# Patient Record
Sex: FEMALE | Race: WHITE | Employment: OTHER | URBAN - METROPOLITAN AREA
[De-identification: names, ages, dates, MRNs, and addresses within clinical notes are randomized per-mention and may not be internally consistent; named-entity substitution may affect disease eponyms.]

---

## 2018-01-01 ENCOUNTER — APPOINTMENT (OUTPATIENT)
Dept: GENERAL RADIOLOGY | Age: 73
DRG: 189 | End: 2018-01-01
Payer: MEDICARE

## 2018-01-01 ENCOUNTER — HOSPITAL ENCOUNTER (INPATIENT)
Age: 73
LOS: 2 days | Discharge: HOME | DRG: 189 | End: 2018-12-17
Attending: EMERGENCY MEDICINE | Admitting: FAMILY MEDICINE
Payer: MEDICARE

## 2018-01-01 ENCOUNTER — TELEPHONE (OUTPATIENT)
Dept: PULMONOLOGY | Age: 73
End: 2018-01-01

## 2018-01-01 VITALS
RESPIRATION RATE: 18 BRPM | BODY MASS INDEX: 28.51 KG/M2 | HEART RATE: 77 BPM | HEIGHT: 61 IN | SYSTOLIC BLOOD PRESSURE: 163 MMHG | WEIGHT: 151 LBS | TEMPERATURE: 98.3 F | OXYGEN SATURATION: 95 % | DIASTOLIC BLOOD PRESSURE: 77 MMHG

## 2018-01-01 DIAGNOSIS — J44.1 CHRONIC OBSTRUCTIVE PULMONARY DISEASE WITH ACUTE EXACERBATION (HCC): ICD-10-CM

## 2018-01-01 DIAGNOSIS — J96.22 ACUTE ON CHRONIC RESPIRATORY FAILURE WITH HYPOXIA AND HYPERCAPNIA (HCC): Primary | ICD-10-CM

## 2018-01-01 DIAGNOSIS — J44.1 CHRONIC OBSTRUCTIVE PULMONARY DISEASE WITH ACUTE EXACERBATION (HCC): Primary | ICD-10-CM

## 2018-01-01 DIAGNOSIS — J96.21 ACUTE ON CHRONIC RESPIRATORY FAILURE WITH HYPOXIA AND HYPERCAPNIA (HCC): Primary | ICD-10-CM

## 2018-01-01 LAB
ALBUMIN SERPL-MCNC: 3.6 G/DL (ref 3.5–5.2)
ALBUMIN SERPL-MCNC: 3.6 G/DL (ref 3.5–5.2)
ALBUMIN SERPL-MCNC: 4.5 G/DL (ref 3.5–5.2)
ALP BLD-CCNC: 69 U/L (ref 35–104)
ALP BLD-CCNC: 69 U/L (ref 35–104)
ALP BLD-CCNC: 89 U/L (ref 35–104)
ALT SERPL-CCNC: 10 U/L (ref 0–32)
ALT SERPL-CCNC: 11 U/L (ref 0–32)
ALT SERPL-CCNC: 9 U/L (ref 0–32)
ANION GAP SERPL CALCULATED.3IONS-SCNC: 10 MMOL/L (ref 7–16)
ANION GAP SERPL CALCULATED.3IONS-SCNC: 11 MMOL/L (ref 7–16)
ANION GAP SERPL CALCULATED.3IONS-SCNC: 13 MMOL/L (ref 7–16)
AST SERPL-CCNC: 13 U/L (ref 0–31)
AST SERPL-CCNC: 15 U/L (ref 0–31)
AST SERPL-CCNC: 22 U/L (ref 0–31)
B.E.: 0.8 MMOL/L (ref -3–3)
BACTERIA: ABNORMAL /HPF
BASOPHILS ABSOLUTE: 0 E9/L (ref 0–0.2)
BASOPHILS ABSOLUTE: 0.04 E9/L (ref 0–0.2)
BASOPHILS RELATIVE PERCENT: 0 % (ref 0–2)
BASOPHILS RELATIVE PERCENT: 0.6 % (ref 0–2)
BILIRUB SERPL-MCNC: 0.3 MG/DL (ref 0–1.2)
BILIRUB SERPL-MCNC: 0.3 MG/DL (ref 0–1.2)
BILIRUB SERPL-MCNC: 0.4 MG/DL (ref 0–1.2)
BILIRUBIN URINE: NEGATIVE
BLOOD CULTURE, ROUTINE: NORMAL
BLOOD, URINE: ABNORMAL
BUN BLDV-MCNC: 30 MG/DL (ref 8–23)
BUN BLDV-MCNC: 34 MG/DL (ref 8–23)
BUN BLDV-MCNC: 47 MG/DL (ref 8–23)
C-REACTIVE PROTEIN: 0.5 MG/DL (ref 0–0.4)
CALCIUM SERPL-MCNC: 10.1 MG/DL (ref 8.6–10.2)
CALCIUM SERPL-MCNC: 8.9 MG/DL (ref 8.6–10.2)
CALCIUM SERPL-MCNC: 9.6 MG/DL (ref 8.6–10.2)
CHLORIDE BLD-SCNC: 102 MMOL/L (ref 98–107)
CHLORIDE BLD-SCNC: 98 MMOL/L (ref 98–107)
CHLORIDE BLD-SCNC: 99 MMOL/L (ref 98–107)
CHOLESTEROL, TOTAL: 131 MG/DL (ref 0–199)
CLARITY: CLEAR
CO2: 27 MMOL/L (ref 22–29)
CO2: 28 MMOL/L (ref 22–29)
CO2: 29 MMOL/L (ref 22–29)
COLOR: YELLOW
CREAT SERPL-MCNC: 1.6 MG/DL (ref 0.5–1)
CREAT SERPL-MCNC: 1.6 MG/DL (ref 0.5–1)
CREAT SERPL-MCNC: 1.7 MG/DL (ref 0.5–1)
CULTURE, BLOOD 2: NORMAL
DELIVERY SYSTEMS: ABNORMAL
DEVICE: ABNORMAL
EKG ATRIAL RATE: 56 BPM
EKG P AXIS: 89 DEGREES
EKG P-R INTERVAL: 180 MS
EKG Q-T INTERVAL: 442 MS
EKG QRS DURATION: 90 MS
EKG QTC CALCULATION (BAZETT): 426 MS
EKG R AXIS: 20 DEGREES
EKG T AXIS: 83 DEGREES
EKG VENTRICULAR RATE: 56 BPM
EOSINOPHILS ABSOLUTE: 0 E9/L (ref 0.05–0.5)
EOSINOPHILS ABSOLUTE: 0.17 E9/L (ref 0.05–0.5)
EOSINOPHILS RELATIVE PERCENT: 0 % (ref 0–6)
EOSINOPHILS RELATIVE PERCENT: 2.5 % (ref 0–6)
EPITHELIAL CELLS, UA: ABNORMAL /HPF
FILM ARRAY ADENOVIRUS: NORMAL
FILM ARRAY BORDETELLA PERTUSSIS: NORMAL
FILM ARRAY CHLAMYDOPHILIA PNEUMONIAE: NORMAL
FILM ARRAY CORONAVIRUS 229E: NORMAL
FILM ARRAY CORONAVIRUS HKU1: NORMAL
FILM ARRAY CORONAVIRUS NL63: NORMAL
FILM ARRAY CORONAVIRUS OC43: NORMAL
FILM ARRAY INFLUENZA A VIRUS 09H1: NORMAL
FILM ARRAY INFLUENZA A VIRUS H1: NORMAL
FILM ARRAY INFLUENZA A VIRUS H3: NORMAL
FILM ARRAY INFLUENZA A VIRUS: NORMAL
FILM ARRAY INFLUENZA B: NORMAL
FILM ARRAY METAPNEUMOVIRUS: NORMAL
FILM ARRAY MYCOPLASMA PNEUMONIAE: NORMAL
FILM ARRAY PARAINFLUENZA VIRUS 1: NORMAL
FILM ARRAY PARAINFLUENZA VIRUS 2: NORMAL
FILM ARRAY PARAINFLUENZA VIRUS 3: NORMAL
FILM ARRAY PARAINFLUENZA VIRUS 4: NORMAL
FILM ARRAY RESPIRATORY SYNCITIAL VIRUS: NORMAL
FILM ARRAY RHINOVIRUS/ENTEROVIRUS: NORMAL
FIO2 ARTERIAL: 40
GFR AFRICAN AMERICAN: 36
GFR AFRICAN AMERICAN: 38
GFR AFRICAN AMERICAN: 38
GFR NON-AFRICAN AMERICAN: 29 ML/MIN/1.73
GFR NON-AFRICAN AMERICAN: 32 ML/MIN/1.73
GFR NON-AFRICAN AMERICAN: 32 ML/MIN/1.73
GLUCOSE BLD-MCNC: 137 MG/DL (ref 74–99)
GLUCOSE BLD-MCNC: 222 MG/DL (ref 74–99)
GLUCOSE BLD-MCNC: 253 MG/DL (ref 74–99)
GLUCOSE URINE: NEGATIVE MG/DL
HBA1C MFR BLD: 5.2 % (ref 4–5.6)
HCO3 ARTERIAL: 28.7 MMOL/L (ref 22–26)
HCT VFR BLD CALC: 35.6 % (ref 34–48)
HCT VFR BLD CALC: 45.6 % (ref 34–48)
HDLC SERPL-MCNC: 33 MG/DL
HEMOGLOBIN: 11 G/DL (ref 11.5–15.5)
HEMOGLOBIN: 13.9 G/DL (ref 11.5–15.5)
IMMATURE GRANULOCYTES #: 0.08 E9/L
IMMATURE GRANULOCYTES %: 1.2 % (ref 0–5)
KETONES, URINE: ABNORMAL MG/DL
LACTIC ACID: 0.8 MMOL/L (ref 0.5–2.2)
LACTIC ACID: 3.5 MMOL/L (ref 0.5–2.2)
LDL CHOLESTEROL CALCULATED: 71 MG/DL (ref 0–99)
LEUKOCYTE ESTERASE, URINE: ABNORMAL
LYMPHOCYTES ABSOLUTE: 0.06 E9/L (ref 1.5–4)
LYMPHOCYTES ABSOLUTE: 2.07 E9/L (ref 1.5–4)
LYMPHOCYTES RELATIVE PERCENT: 0.9 % (ref 20–42)
LYMPHOCYTES RELATIVE PERCENT: 30.8 % (ref 20–42)
MAGNESIUM: 2.1 MG/DL (ref 1.6–2.6)
MCH RBC QN AUTO: 29.4 PG (ref 26–35)
MCH RBC QN AUTO: 29.6 PG (ref 26–35)
MCHC RBC AUTO-ENTMCNC: 30.5 % (ref 32–34.5)
MCHC RBC AUTO-ENTMCNC: 30.9 % (ref 32–34.5)
MCV RBC AUTO: 95.2 FL (ref 80–99.9)
MCV RBC AUTO: 97 FL (ref 80–99.9)
METAMYELOCYTES RELATIVE PERCENT: 0.9 % (ref 0–1)
METER GLUCOSE: 149 MG/DL (ref 74–99)
METER GLUCOSE: 183 MG/DL (ref 74–99)
METER GLUCOSE: 212 MG/DL (ref 74–99)
METER GLUCOSE: 227 MG/DL (ref 74–99)
METER GLUCOSE: 251 MG/DL (ref 74–99)
METER GLUCOSE: 263 MG/DL (ref 74–99)
METER GLUCOSE: 342 MG/DL (ref 74–99)
MONOCYTES ABSOLUTE: 0.06 E9/L (ref 0.1–0.95)
MONOCYTES ABSOLUTE: 0.61 E9/L (ref 0.1–0.95)
MONOCYTES RELATIVE PERCENT: 0.9 % (ref 2–12)
MONOCYTES RELATIVE PERCENT: 9.1 % (ref 2–12)
MYELOCYTE PERCENT: 0.9 % (ref 0–0)
NEUTROPHILS ABSOLUTE: 3.74 E9/L (ref 1.8–7.3)
NEUTROPHILS ABSOLUTE: 5.68 E9/L (ref 1.8–7.3)
NEUTROPHILS RELATIVE PERCENT: 55.8 % (ref 43–80)
NEUTROPHILS RELATIVE PERCENT: 96.5 % (ref 43–80)
NITRITE, URINE: NEGATIVE
O2 SATURATION: 98 % (ref 92–98.5)
OPERATOR ID: 2124
ORGANISM: ABNORMAL
OVALOCYTES: ABNORMAL
PCO2 ARTERIAL: 59.2 MMHG (ref 35–45)
PDW BLD-RTO: 15 FL (ref 11.5–15)
PDW BLD-RTO: 15.2 FL (ref 11.5–15)
PH BLOOD GAS: 7.29 (ref 7.35–7.45)
PH UA: 6 (ref 5–9)
PLATELET # BLD: 160 E9/L (ref 130–450)
PLATELET # BLD: 199 E9/L (ref 130–450)
PMV BLD AUTO: 10.4 FL (ref 7–12)
PMV BLD AUTO: 9.8 FL (ref 7–12)
PO2 ARTERIAL: 118.7 MMHG (ref 60–80)
POIKILOCYTES: ABNORMAL
POLYCHROMASIA: ABNORMAL
POTASSIUM SERPL-SCNC: 4.7 MMOL/L (ref 3.5–5)
POTASSIUM SERPL-SCNC: 4.9 MMOL/L (ref 3.5–5)
POTASSIUM SERPL-SCNC: 4.9 MMOL/L (ref 3.5–5)
PRO-BNP: 2934 PG/ML (ref 0–450)
PROTEIN UA: 100 MG/DL
RBC # BLD: 3.74 E12/L (ref 3.5–5.5)
RBC # BLD: 4.7 E12/L (ref 3.5–5.5)
RBC UA: ABNORMAL /HPF (ref 0–2)
SEDIMENTATION RATE, ERYTHROCYTE: 23 MM/HR (ref 0–20)
SODIUM BLD-SCNC: 137 MMOL/L (ref 132–146)
SODIUM BLD-SCNC: 140 MMOL/L (ref 132–146)
SODIUM BLD-SCNC: 140 MMOL/L (ref 132–146)
SOURCE, BLOOD GAS: ABNORMAL
SPECIFIC GRAVITY UA: >=1.03 (ref 1–1.03)
TOTAL PROTEIN: 6.5 G/DL (ref 6.4–8.3)
TOTAL PROTEIN: 6.9 G/DL (ref 6.4–8.3)
TOTAL PROTEIN: 8 G/DL (ref 6.4–8.3)
TRIGL SERPL-MCNC: 134 MG/DL (ref 0–149)
TROPONIN: 0.01 NG/ML (ref 0–0.03)
TSH SERPL DL<=0.05 MIU/L-ACNC: 1.04 UIU/ML (ref 0.27–4.2)
URINE CULTURE, ROUTINE: ABNORMAL
URINE CULTURE, ROUTINE: ABNORMAL
UROBILINOGEN, URINE: 0.2 E.U./DL
VLDLC SERPL CALC-MCNC: 27 MG/DL
WBC # BLD: 5.8 E9/L (ref 4.5–11.5)
WBC # BLD: 6.7 E9/L (ref 4.5–11.5)
WBC UA: >20 /HPF (ref 0–5)

## 2018-01-01 PROCEDURE — 6360000002 HC RX W HCPCS: Performed by: INTERNAL MEDICINE

## 2018-01-01 PROCEDURE — 85025 COMPLETE CBC W/AUTO DIFF WBC: CPT

## 2018-01-01 PROCEDURE — 97165 OT EVAL LOW COMPLEX 30 MIN: CPT

## 2018-01-01 PROCEDURE — 2700000000 HC OXYGEN THERAPY PER DAY

## 2018-01-01 PROCEDURE — 87633 RESP VIRUS 12-25 TARGETS: CPT

## 2018-01-01 PROCEDURE — 71045 X-RAY EXAM CHEST 1 VIEW: CPT

## 2018-01-01 PROCEDURE — 82962 GLUCOSE BLOOD TEST: CPT

## 2018-01-01 PROCEDURE — G8988 SELF CARE GOAL STATUS: HCPCS

## 2018-01-01 PROCEDURE — 99285 EMERGENCY DEPT VISIT HI MDM: CPT

## 2018-01-01 PROCEDURE — 97161 PT EVAL LOW COMPLEX 20 MIN: CPT | Performed by: PHYSICAL THERAPIST

## 2018-01-01 PROCEDURE — 80053 COMPREHEN METABOLIC PANEL: CPT

## 2018-01-01 PROCEDURE — 2580000003 HC RX 258: Performed by: FAMILY MEDICINE

## 2018-01-01 PROCEDURE — G8987 SELF CARE CURRENT STATUS: HCPCS

## 2018-01-01 PROCEDURE — 96374 THER/PROPH/DIAG INJ IV PUSH: CPT

## 2018-01-01 PROCEDURE — 36600 WITHDRAWAL OF ARTERIAL BLOOD: CPT

## 2018-01-01 PROCEDURE — 83735 ASSAY OF MAGNESIUM: CPT

## 2018-01-01 PROCEDURE — 2060000000 HC ICU INTERMEDIATE R&B

## 2018-01-01 PROCEDURE — 87486 CHLMYD PNEUM DNA AMP PROBE: CPT

## 2018-01-01 PROCEDURE — 6360000002 HC RX W HCPCS: Performed by: FAMILY MEDICINE

## 2018-01-01 PROCEDURE — 94640 AIRWAY INHALATION TREATMENT: CPT

## 2018-01-01 PROCEDURE — 6370000000 HC RX 637 (ALT 250 FOR IP): Performed by: FAMILY MEDICINE

## 2018-01-01 PROCEDURE — 84443 ASSAY THYROID STIM HORMONE: CPT

## 2018-01-01 PROCEDURE — 86140 C-REACTIVE PROTEIN: CPT

## 2018-01-01 PROCEDURE — 87186 SC STD MICRODIL/AGAR DIL: CPT

## 2018-01-01 PROCEDURE — 97535 SELF CARE MNGMENT TRAINING: CPT

## 2018-01-01 PROCEDURE — 87040 BLOOD CULTURE FOR BACTERIA: CPT

## 2018-01-01 PROCEDURE — 82803 BLOOD GASES ANY COMBINATION: CPT

## 2018-01-01 PROCEDURE — G8978 MOBILITY CURRENT STATUS: HCPCS | Performed by: PHYSICAL THERAPIST

## 2018-01-01 PROCEDURE — G8979 MOBILITY GOAL STATUS: HCPCS | Performed by: PHYSICAL THERAPIST

## 2018-01-01 PROCEDURE — 87581 M.PNEUMON DNA AMP PROBE: CPT

## 2018-01-01 PROCEDURE — 87088 URINE BACTERIA CULTURE: CPT

## 2018-01-01 PROCEDURE — 6360000002 HC RX W HCPCS: Performed by: STUDENT IN AN ORGANIZED HEALTH CARE EDUCATION/TRAINING PROGRAM

## 2018-01-01 PROCEDURE — 85651 RBC SED RATE NONAUTOMATED: CPT

## 2018-01-01 PROCEDURE — 36415 COLL VENOUS BLD VENIPUNCTURE: CPT

## 2018-01-01 PROCEDURE — 83605 ASSAY OF LACTIC ACID: CPT

## 2018-01-01 PROCEDURE — 80061 LIPID PANEL: CPT

## 2018-01-01 PROCEDURE — 94660 CPAP INITIATION&MGMT: CPT

## 2018-01-01 PROCEDURE — 83880 ASSAY OF NATRIURETIC PEPTIDE: CPT

## 2018-01-01 PROCEDURE — 84484 ASSAY OF TROPONIN QUANT: CPT

## 2018-01-01 PROCEDURE — 87798 DETECT AGENT NOS DNA AMP: CPT

## 2018-01-01 PROCEDURE — 6370000000 HC RX 637 (ALT 250 FOR IP): Performed by: STUDENT IN AN ORGANIZED HEALTH CARE EDUCATION/TRAINING PROGRAM

## 2018-01-01 PROCEDURE — 81001 URINALYSIS AUTO W/SCOPE: CPT

## 2018-01-01 PROCEDURE — 83036 HEMOGLOBIN GLYCOSYLATED A1C: CPT

## 2018-01-01 RX ORDER — FUROSEMIDE 40 MG/1
40 TABLET ORAL DAILY
Status: DISCONTINUED | OUTPATIENT
Start: 2018-01-01 | End: 2018-01-01 | Stop reason: HOSPADM

## 2018-01-01 RX ORDER — PANTOPRAZOLE SODIUM 40 MG/1
40 TABLET, DELAYED RELEASE ORAL DAILY
Status: DISCONTINUED | OUTPATIENT
Start: 2018-01-01 | End: 2018-01-01 | Stop reason: HOSPADM

## 2018-01-01 RX ORDER — OYSTER SHELL CALCIUM WITH VITAMIN D 500; 200 MG/1; [IU]/1
1 TABLET, FILM COATED ORAL 2 TIMES DAILY
Status: DISCONTINUED | OUTPATIENT
Start: 2018-01-01 | End: 2018-01-01

## 2018-01-01 RX ORDER — ATORVASTATIN CALCIUM 40 MG/1
80 TABLET, FILM COATED ORAL NIGHTLY
Status: DISCONTINUED | OUTPATIENT
Start: 2018-01-01 | End: 2018-01-01 | Stop reason: HOSPADM

## 2018-01-01 RX ORDER — PREDNISONE 20 MG/1
20 TABLET ORAL 2 TIMES DAILY
Qty: 20 TABLET | Refills: 0 | Status: SHIPPED | OUTPATIENT
Start: 2018-01-01 | End: 2018-01-01

## 2018-01-01 RX ORDER — ALBUTEROL SULFATE 90 UG/1
2 AEROSOL, METERED RESPIRATORY (INHALATION) EVERY 6 HOURS PRN
Qty: 1 INHALER | Refills: 3 | Status: SHIPPED | OUTPATIENT
Start: 2018-01-01

## 2018-01-01 RX ORDER — METHYLPREDNISOLONE SODIUM SUCCINATE 125 MG/2ML
125 INJECTION, POWDER, LYOPHILIZED, FOR SOLUTION INTRAMUSCULAR; INTRAVENOUS ONCE
Status: COMPLETED | OUTPATIENT
Start: 2018-01-01 | End: 2018-01-01

## 2018-01-01 RX ORDER — ASPIRIN 325 MG
325 TABLET ORAL DAILY
Status: DISCONTINUED | OUTPATIENT
Start: 2018-01-01 | End: 2018-01-01

## 2018-01-01 RX ORDER — PREDNISONE 20 MG/1
TABLET ORAL
Qty: 30 TABLET | Refills: 0 | Status: SHIPPED | OUTPATIENT
Start: 2018-01-01 | End: 2018-01-01

## 2018-01-01 RX ORDER — DILTIAZEM HYDROCHLORIDE 180 MG/1
180 CAPSULE, COATED, EXTENDED RELEASE ORAL DAILY
Status: DISCONTINUED | OUTPATIENT
Start: 2018-01-01 | End: 2018-01-01 | Stop reason: HOSPADM

## 2018-01-01 RX ORDER — ALBUTEROL SULFATE 2.5 MG/3ML
5 SOLUTION RESPIRATORY (INHALATION) 4 TIMES DAILY PRN
Status: DISCONTINUED | OUTPATIENT
Start: 2018-01-01 | End: 2018-01-01

## 2018-01-01 RX ORDER — INSULIN GLARGINE 100 [IU]/ML
12 INJECTION, SOLUTION SUBCUTANEOUS NIGHTLY
Status: DISCONTINUED | OUTPATIENT
Start: 2018-01-01 | End: 2018-01-01 | Stop reason: HOSPADM

## 2018-01-01 RX ORDER — DEXTROSE MONOHYDRATE 50 MG/ML
100 INJECTION, SOLUTION INTRAVENOUS PRN
Status: DISCONTINUED | OUTPATIENT
Start: 2018-01-01 | End: 2018-01-01 | Stop reason: HOSPADM

## 2018-01-01 RX ORDER — METHYLPREDNISOLONE SODIUM SUCCINATE 40 MG/ML
40 INJECTION, POWDER, LYOPHILIZED, FOR SOLUTION INTRAMUSCULAR; INTRAVENOUS EVERY 6 HOURS
Status: DISCONTINUED | OUTPATIENT
Start: 2018-01-01 | End: 2018-01-01

## 2018-01-01 RX ORDER — SODIUM CHLORIDE 0.9 % (FLUSH) 0.9 %
10 SYRINGE (ML) INJECTION PRN
Status: DISCONTINUED | OUTPATIENT
Start: 2018-01-01 | End: 2018-01-01 | Stop reason: HOSPADM

## 2018-01-01 RX ORDER — CLONAZEPAM 0.5 MG/1
0.5 TABLET ORAL 2 TIMES DAILY
Status: DISCONTINUED | OUTPATIENT
Start: 2018-01-01 | End: 2018-01-01 | Stop reason: HOSPADM

## 2018-01-01 RX ORDER — PREDNISONE 20 MG/1
20 TABLET ORAL DAILY
Qty: 10 TABLET | Refills: 0 | Status: SHIPPED | OUTPATIENT
Start: 2018-01-01 | End: 2019-01-01

## 2018-01-01 RX ORDER — IPRATROPIUM BROMIDE AND ALBUTEROL SULFATE 2.5; .5 MG/3ML; MG/3ML
3 SOLUTION RESPIRATORY (INHALATION) ONCE
Status: COMPLETED | OUTPATIENT
Start: 2018-01-01 | End: 2018-01-01

## 2018-01-01 RX ORDER — ASPIRIN 81 MG/1
81 TABLET, CHEWABLE ORAL DAILY
Status: DISCONTINUED | OUTPATIENT
Start: 2018-01-01 | End: 2018-01-01 | Stop reason: HOSPADM

## 2018-01-01 RX ORDER — NICOTINE 21 MG/24HR
1 PATCH, TRANSDERMAL 24 HOURS TRANSDERMAL DAILY
Status: DISCONTINUED | OUTPATIENT
Start: 2018-01-01 | End: 2018-01-01 | Stop reason: HOSPADM

## 2018-01-01 RX ORDER — ACETAMINOPHEN 325 MG/1
650 TABLET ORAL EVERY 4 HOURS PRN
Status: DISCONTINUED | OUTPATIENT
Start: 2018-01-01 | End: 2018-01-01 | Stop reason: HOSPADM

## 2018-01-01 RX ORDER — LEVOFLOXACIN 500 MG/1
500 TABLET, FILM COATED ORAL DAILY
Qty: 3 TABLET | Refills: 0 | Status: SHIPPED | OUTPATIENT
Start: 2018-01-01 | End: 2018-01-01

## 2018-01-01 RX ORDER — LEVOTHYROXINE SODIUM 137 UG/1
137 TABLET ORAL DAILY
Status: DISCONTINUED | OUTPATIENT
Start: 2018-01-01 | End: 2018-01-01 | Stop reason: HOSPADM

## 2018-01-01 RX ORDER — AMLODIPINE BESYLATE 5 MG/1
5 TABLET ORAL DAILY
Status: DISCONTINUED | OUTPATIENT
Start: 2018-01-01 | End: 2018-01-01 | Stop reason: HOSPADM

## 2018-01-01 RX ORDER — DEXTROSE MONOHYDRATE 25 G/50ML
12.5 INJECTION, SOLUTION INTRAVENOUS PRN
Status: DISCONTINUED | OUTPATIENT
Start: 2018-01-01 | End: 2018-01-01 | Stop reason: HOSPADM

## 2018-01-01 RX ORDER — METHYLPREDNISOLONE SODIUM SUCCINATE 40 MG/ML
40 INJECTION, POWDER, LYOPHILIZED, FOR SOLUTION INTRAMUSCULAR; INTRAVENOUS EVERY 12 HOURS
Status: DISCONTINUED | OUTPATIENT
Start: 2018-01-01 | End: 2018-01-01 | Stop reason: HOSPADM

## 2018-01-01 RX ORDER — LANOLIN ALCOHOL/MO/W.PET/CERES
3 CREAM (GRAM) TOPICAL NIGHTLY PRN
Status: DISCONTINUED | OUTPATIENT
Start: 2018-01-01 | End: 2018-01-01 | Stop reason: HOSPADM

## 2018-01-01 RX ORDER — OXYCODONE HYDROCHLORIDE 15 MG/1
15 TABLET ORAL 4 TIMES DAILY
Status: DISCONTINUED | OUTPATIENT
Start: 2018-01-01 | End: 2018-01-01

## 2018-01-01 RX ORDER — OXYCODONE HYDROCHLORIDE 15 MG/1
15 TABLET ORAL EVERY 6 HOURS PRN
Status: DISCONTINUED | OUTPATIENT
Start: 2018-01-01 | End: 2018-01-01 | Stop reason: HOSPADM

## 2018-01-01 RX ORDER — IPRATROPIUM BROMIDE AND ALBUTEROL SULFATE 2.5; .5 MG/3ML; MG/3ML
1 SOLUTION RESPIRATORY (INHALATION)
Status: DISCONTINUED | OUTPATIENT
Start: 2018-01-01 | End: 2018-01-01 | Stop reason: HOSPADM

## 2018-01-01 RX ORDER — GABAPENTIN 100 MG/1
100 CAPSULE ORAL 3 TIMES DAILY
Status: DISCONTINUED | OUTPATIENT
Start: 2018-01-01 | End: 2018-01-01 | Stop reason: HOSPADM

## 2018-01-01 RX ORDER — SODIUM CHLORIDE 0.9 % (FLUSH) 0.9 %
10 SYRINGE (ML) INJECTION EVERY 12 HOURS SCHEDULED
Status: DISCONTINUED | OUTPATIENT
Start: 2018-01-01 | End: 2018-01-01 | Stop reason: HOSPADM

## 2018-01-01 RX ORDER — NICOTINE POLACRILEX 4 MG
15 LOZENGE BUCCAL PRN
Status: DISCONTINUED | OUTPATIENT
Start: 2018-01-01 | End: 2018-01-01 | Stop reason: HOSPADM

## 2018-01-01 RX ORDER — CITALOPRAM 20 MG/1
20 TABLET ORAL NIGHTLY
Status: DISCONTINUED | OUTPATIENT
Start: 2018-01-01 | End: 2018-01-01 | Stop reason: HOSPADM

## 2018-01-01 RX ORDER — ALBUTEROL SULFATE 2.5 MG/3ML
5 SOLUTION RESPIRATORY (INHALATION) 4 TIMES DAILY
Status: DISCONTINUED | OUTPATIENT
Start: 2018-01-01 | End: 2018-01-01

## 2018-01-01 RX ORDER — CLOPIDOGREL BISULFATE 75 MG/1
75 TABLET ORAL DAILY
Status: DISCONTINUED | OUTPATIENT
Start: 2018-01-01 | End: 2018-01-01 | Stop reason: HOSPADM

## 2018-01-01 RX ADMIN — IPRATROPIUM BROMIDE AND ALBUTEROL SULFATE 1 AMPULE: .5; 3 SOLUTION RESPIRATORY (INHALATION) at 05:25

## 2018-01-01 RX ADMIN — Medication 10 ML: at 08:30

## 2018-01-01 RX ADMIN — ASPIRIN 81 MG 81 MG: 81 TABLET ORAL at 08:59

## 2018-01-01 RX ADMIN — MELATONIN TAB 3 MG 3 MG: 3 TAB at 00:24

## 2018-01-01 RX ADMIN — Medication 10 ML: at 09:15

## 2018-01-01 RX ADMIN — GABAPENTIN 100 MG: 100 CAPSULE ORAL at 08:27

## 2018-01-01 RX ADMIN — IPRATROPIUM BROMIDE AND ALBUTEROL SULFATE 3 AMPULE: .5; 3 SOLUTION RESPIRATORY (INHALATION) at 19:20

## 2018-01-01 RX ADMIN — ALBUTEROL SULFATE 5 MG: 2.5 SOLUTION RESPIRATORY (INHALATION) at 22:31

## 2018-01-01 RX ADMIN — CLOPIDOGREL BISULFATE 75 MG: 75 TABLET ORAL at 08:57

## 2018-01-01 RX ADMIN — IPRATROPIUM BROMIDE AND ALBUTEROL SULFATE 1 AMPULE: .5; 3 SOLUTION RESPIRATORY (INHALATION) at 09:15

## 2018-01-01 RX ADMIN — METHYLPREDNISOLONE SODIUM SUCCINATE 40 MG: 40 INJECTION, POWDER, FOR SOLUTION INTRAMUSCULAR; INTRAVENOUS at 12:22

## 2018-01-01 RX ADMIN — SODIUM CHLORIDE 1 G: 9 INJECTION INTRAMUSCULAR; INTRAVENOUS; SUBCUTANEOUS at 13:25

## 2018-01-01 RX ADMIN — GABAPENTIN 100 MG: 100 CAPSULE ORAL at 21:40

## 2018-01-01 RX ADMIN — INSULIN LISPRO 6 UNITS: 100 INJECTION, SOLUTION INTRAVENOUS; SUBCUTANEOUS at 12:21

## 2018-01-01 RX ADMIN — GABAPENTIN 100 MG: 100 CAPSULE ORAL at 23:28

## 2018-01-01 RX ADMIN — ROFLUMILAST 500 MCG: 500 TABLET ORAL at 08:27

## 2018-01-01 RX ADMIN — OXYCODONE HYDROCHLORIDE 15 MG: 15 TABLET ORAL at 13:38

## 2018-01-01 RX ADMIN — METOPROLOL TARTRATE 25 MG: 25 TABLET ORAL at 08:27

## 2018-01-01 RX ADMIN — IPRATROPIUM BROMIDE AND ALBUTEROL SULFATE 1 AMPULE: .5; 3 SOLUTION RESPIRATORY (INHALATION) at 17:19

## 2018-01-01 RX ADMIN — GABAPENTIN 100 MG: 100 CAPSULE ORAL at 08:59

## 2018-01-01 RX ADMIN — LEVOTHYROXINE SODIUM 137 MCG: 137 TABLET ORAL at 06:02

## 2018-01-01 RX ADMIN — ASPIRIN 81 MG 81 MG: 81 TABLET ORAL at 08:27

## 2018-01-01 RX ADMIN — ACETAMINOPHEN 650 MG: 325 TABLET, FILM COATED ORAL at 14:28

## 2018-01-01 RX ADMIN — INSULIN LISPRO 4 UNITS: 100 INJECTION, SOLUTION INTRAVENOUS; SUBCUTANEOUS at 08:59

## 2018-01-01 RX ADMIN — METOPROLOL TARTRATE 25 MG: 25 TABLET ORAL at 08:57

## 2018-01-01 RX ADMIN — METHYLPREDNISOLONE SODIUM SUCCINATE 40 MG: 40 INJECTION, POWDER, FOR SOLUTION INTRAMUSCULAR; INTRAVENOUS at 11:46

## 2018-01-01 RX ADMIN — CITALOPRAM HYDROBROMIDE 20 MG: 20 TABLET ORAL at 21:40

## 2018-01-01 RX ADMIN — ATORVASTATIN CALCIUM 80 MG: 40 TABLET, FILM COATED ORAL at 21:40

## 2018-01-01 RX ADMIN — Medication 10 ML: at 23:28

## 2018-01-01 RX ADMIN — GABAPENTIN 100 MG: 100 CAPSULE ORAL at 13:24

## 2018-01-01 RX ADMIN — OXYCODONE HYDROCHLORIDE 15 MG: 15 TABLET ORAL at 23:25

## 2018-01-01 RX ADMIN — IPRATROPIUM BROMIDE AND ALBUTEROL SULFATE 1 AMPULE: .5; 3 SOLUTION RESPIRATORY (INHALATION) at 10:02

## 2018-01-01 RX ADMIN — CLONAZEPAM 0.5 MG: 0.5 TABLET ORAL at 08:27

## 2018-01-01 RX ADMIN — ATORVASTATIN CALCIUM 80 MG: 40 TABLET, FILM COATED ORAL at 23:28

## 2018-01-01 RX ADMIN — CLONAZEPAM 0.5 MG: 0.5 TABLET ORAL at 08:57

## 2018-01-01 RX ADMIN — INSULIN LISPRO 2 UNITS: 100 INJECTION, SOLUTION INTRAVENOUS; SUBCUTANEOUS at 11:46

## 2018-01-01 RX ADMIN — METHYLPREDNISOLONE SODIUM SUCCINATE 40 MG: 40 INJECTION, POWDER, FOR SOLUTION INTRAMUSCULAR; INTRAVENOUS at 06:03

## 2018-01-01 RX ADMIN — CLOPIDOGREL BISULFATE 75 MG: 75 TABLET ORAL at 08:27

## 2018-01-01 RX ADMIN — INSULIN LISPRO 8 UNITS: 100 INJECTION, SOLUTION INTRAVENOUS; SUBCUTANEOUS at 16:58

## 2018-01-01 RX ADMIN — ACETAMINOPHEN 650 MG: 325 TABLET, FILM COATED ORAL at 02:30

## 2018-01-01 RX ADMIN — INSULIN GLARGINE 12 UNITS: 100 INJECTION, SOLUTION SUBCUTANEOUS at 21:45

## 2018-01-01 RX ADMIN — FUROSEMIDE 40 MG: 40 TABLET ORAL at 08:28

## 2018-01-01 RX ADMIN — LEVOTHYROXINE SODIUM 137 MCG: 137 TABLET ORAL at 06:13

## 2018-01-01 RX ADMIN — ROFLUMILAST 500 MCG: 500 TABLET ORAL at 08:59

## 2018-01-01 RX ADMIN — PANTOPRAZOLE SODIUM 40 MG: 40 TABLET, DELAYED RELEASE ORAL at 08:57

## 2018-01-01 RX ADMIN — METHYLPREDNISOLONE SODIUM SUCCINATE 125 MG: 125 INJECTION, POWDER, FOR SOLUTION INTRAMUSCULAR; INTRAVENOUS at 19:38

## 2018-01-01 RX ADMIN — PANTOPRAZOLE SODIUM 40 MG: 40 TABLET, DELAYED RELEASE ORAL at 08:28

## 2018-01-01 RX ADMIN — INSULIN LISPRO 4 UNITS: 100 INJECTION, SOLUTION INTRAVENOUS; SUBCUTANEOUS at 08:26

## 2018-01-01 RX ADMIN — DILTIAZEM HYDROCHLORIDE 180 MG: 180 CAPSULE, COATED, EXTENDED RELEASE ORAL at 08:58

## 2018-01-01 RX ADMIN — IPRATROPIUM BROMIDE AND ALBUTEROL SULFATE 1 AMPULE: .5; 3 SOLUTION RESPIRATORY (INHALATION) at 13:09

## 2018-01-01 RX ADMIN — DILTIAZEM HYDROCHLORIDE 180 MG: 180 CAPSULE, COATED, EXTENDED RELEASE ORAL at 08:27

## 2018-01-01 RX ADMIN — FUROSEMIDE 40 MG: 40 TABLET ORAL at 08:59

## 2018-01-01 RX ADMIN — IPRATROPIUM BROMIDE AND ALBUTEROL SULFATE 1 AMPULE: .5; 3 SOLUTION RESPIRATORY (INHALATION) at 05:37

## 2018-01-01 RX ADMIN — GABAPENTIN 100 MG: 100 CAPSULE ORAL at 14:22

## 2018-01-01 RX ADMIN — Medication 10 ML: at 21:40

## 2018-01-01 RX ADMIN — CLONAZEPAM 0.5 MG: 0.5 TABLET ORAL at 23:28

## 2018-01-01 RX ADMIN — METHYLPREDNISOLONE SODIUM SUCCINATE 40 MG: 40 INJECTION, POWDER, FOR SOLUTION INTRAMUSCULAR; INTRAVENOUS at 00:24

## 2018-01-01 RX ADMIN — AMLODIPINE BESYLATE 5 MG: 5 TABLET ORAL at 08:58

## 2018-01-01 RX ADMIN — CITALOPRAM HYDROBROMIDE 20 MG: 20 TABLET ORAL at 23:28

## 2018-01-01 RX ADMIN — OXYCODONE HYDROCHLORIDE 15 MG: 15 TABLET ORAL at 06:12

## 2018-01-01 RX ADMIN — CLONAZEPAM 0.5 MG: 0.5 TABLET ORAL at 21:43

## 2018-01-01 RX ADMIN — AMLODIPINE BESYLATE 5 MG: 5 TABLET ORAL at 08:27

## 2018-01-01 ASSESSMENT — PAIN DESCRIPTION - PAIN TYPE
TYPE: ACUTE PAIN
TYPE: CHRONIC PAIN

## 2018-01-01 ASSESSMENT — PAIN SCALES - GENERAL
PAINLEVEL_OUTOF10: 6
PAINLEVEL_OUTOF10: 7
PAINLEVEL_OUTOF10: 0
PAINLEVEL_OUTOF10: 8
PAINLEVEL_OUTOF10: 0
PAINLEVEL_OUTOF10: 3
PAINLEVEL_OUTOF10: 1
PAINLEVEL_OUTOF10: 7

## 2018-01-01 ASSESSMENT — PAIN DESCRIPTION - FREQUENCY
FREQUENCY: CONTINUOUS

## 2018-01-01 ASSESSMENT — PAIN DESCRIPTION - ORIENTATION
ORIENTATION: LOWER
ORIENTATION: LOWER

## 2018-01-01 ASSESSMENT — PAIN DESCRIPTION - DESCRIPTORS
DESCRIPTORS: ACHING
DESCRIPTORS: ACHING;CONSTANT;DISCOMFORT
DESCRIPTORS: ACHING;SHARP
DESCRIPTORS: ACHING
DESCRIPTORS: ACHING

## 2018-01-01 ASSESSMENT — PAIN DESCRIPTION - LOCATION
LOCATION: BACK;GENERALIZED
LOCATION: HEAD
LOCATION: BACK
LOCATION: BACK;GENERALIZED

## 2018-07-14 ENCOUNTER — APPOINTMENT (OUTPATIENT)
Dept: GENERAL RADIOLOGY | Age: 73
DRG: 064 | End: 2018-07-14
Payer: MEDICARE

## 2018-07-14 ENCOUNTER — HOSPITAL ENCOUNTER (INPATIENT)
Age: 73
LOS: 5 days | Discharge: HOME HEALTH | DRG: 064 | End: 2018-07-20
Attending: EMERGENCY MEDICINE | Admitting: FAMILY MEDICINE
Payer: MEDICARE

## 2018-07-14 ENCOUNTER — APPOINTMENT (OUTPATIENT)
Dept: CT IMAGING | Age: 73
DRG: 064 | End: 2018-07-14
Payer: MEDICARE

## 2018-07-14 DIAGNOSIS — R29.90 STROKE-LIKE SYMPTOMS: Primary | ICD-10-CM

## 2018-07-14 PROBLEM — F17.200 TOBACCO DEPENDENCE: Status: ACTIVE | Noted: 2018-07-14

## 2018-07-14 PROBLEM — E11.29 TYPE 2 DIABETES MELLITUS WITH RENAL COMPLICATION (HCC): Status: ACTIVE | Noted: 2018-07-14

## 2018-07-14 PROBLEM — E03.9 HYPOTHYROIDISM: Status: ACTIVE | Noted: 2018-07-14

## 2018-07-14 PROBLEM — K43.9 HERNIA OF ANTERIOR ABDOMINAL WALL: Status: ACTIVE | Noted: 2018-07-14

## 2018-07-14 PROBLEM — R29.898 LEFT ARM WEAKNESS: Status: ACTIVE | Noted: 2018-07-14

## 2018-07-14 PROBLEM — N18.9 ACUTE KIDNEY INJURY SUPERIMPOSED ON CHRONIC KIDNEY DISEASE (HCC): Status: ACTIVE | Noted: 2018-07-14

## 2018-07-14 PROBLEM — T14.90XA SOFT TISSUE INJURY: Status: ACTIVE | Noted: 2018-07-14

## 2018-07-14 PROBLEM — I25.10 CAD (CORONARY ARTERY DISEASE): Status: ACTIVE | Noted: 2018-07-14

## 2018-07-14 PROBLEM — N17.9 ACUTE KIDNEY INJURY SUPERIMPOSED ON CHRONIC KIDNEY DISEASE (HCC): Status: ACTIVE | Noted: 2018-07-14

## 2018-07-14 PROBLEM — E78.5 HLD (HYPERLIPIDEMIA): Status: ACTIVE | Noted: 2018-07-14

## 2018-07-14 PROBLEM — I10 HTN (HYPERTENSION): Status: ACTIVE | Noted: 2018-07-14

## 2018-07-14 PROBLEM — N39.0 UTI (URINARY TRACT INFECTION): Status: ACTIVE | Noted: 2018-07-14

## 2018-07-14 PROBLEM — Z91.81 HISTORY OF RECENT FALL: Status: ACTIVE | Noted: 2018-07-14

## 2018-07-14 PROBLEM — J44.9 COPD (CHRONIC OBSTRUCTIVE PULMONARY DISEASE) (HCC): Status: ACTIVE | Noted: 2018-07-14

## 2018-07-14 LAB
ALBUMIN SERPL-MCNC: 3.5 G/DL (ref 3.5–5.2)
ALP BLD-CCNC: 74 U/L (ref 35–104)
ALT SERPL-CCNC: 14 U/L (ref 0–32)
ANION GAP SERPL CALCULATED.3IONS-SCNC: 10 MMOL/L (ref 7–16)
APTT: 29.7 SEC (ref 24.5–35.1)
AST SERPL-CCNC: 17 U/L (ref 0–31)
BACTERIA: ABNORMAL /HPF
BASOPHILS ABSOLUTE: 0.03 E9/L (ref 0–0.2)
BASOPHILS RELATIVE PERCENT: 0.6 % (ref 0–2)
BILIRUB SERPL-MCNC: 0.4 MG/DL (ref 0–1.2)
BILIRUBIN URINE: ABNORMAL
BLOOD, URINE: ABNORMAL
BUN BLDV-MCNC: 36 MG/DL (ref 8–23)
CALCIUM SERPL-MCNC: 9.4 MG/DL (ref 8.6–10.2)
CHLORIDE BLD-SCNC: 104 MMOL/L (ref 98–107)
CLARITY: ABNORMAL
CO2: 30 MMOL/L (ref 22–29)
COLOR: YELLOW
CREAT SERPL-MCNC: 1.8 MG/DL (ref 0.5–1)
EOSINOPHILS ABSOLUTE: 0.2 E9/L (ref 0.05–0.5)
EOSINOPHILS RELATIVE PERCENT: 3.9 % (ref 0–6)
EPITHELIAL CELLS, UA: ABNORMAL /HPF
GFR AFRICAN AMERICAN: 33
GFR NON-AFRICAN AMERICAN: 28 ML/MIN/1.73
GLUCOSE BLD-MCNC: 107 MG/DL (ref 74–109)
GLUCOSE URINE: NEGATIVE MG/DL
HCT VFR BLD CALC: 43 % (ref 34–48)
HEMOGLOBIN: 13.6 G/DL (ref 11.5–15.5)
IMMATURE GRANULOCYTES #: 0.03 E9/L
IMMATURE GRANULOCYTES %: 0.6 % (ref 0–5)
INR BLD: 1
KETONES, URINE: ABNORMAL MG/DL
LEUKOCYTE ESTERASE, URINE: ABNORMAL
LYMPHOCYTES ABSOLUTE: 0.78 E9/L (ref 1.5–4)
LYMPHOCYTES RELATIVE PERCENT: 15.4 % (ref 20–42)
MAGNESIUM: 2.1 MG/DL (ref 1.6–2.6)
MCH RBC QN AUTO: 29.9 PG (ref 26–35)
MCHC RBC AUTO-ENTMCNC: 31.6 % (ref 32–34.5)
MCV RBC AUTO: 94.5 FL (ref 80–99.9)
MONOCYTES ABSOLUTE: 0.38 E9/L (ref 0.1–0.95)
MONOCYTES RELATIVE PERCENT: 7.5 % (ref 2–12)
NEUTROPHILS ABSOLUTE: 3.65 E9/L (ref 1.8–7.3)
NEUTROPHILS RELATIVE PERCENT: 72 % (ref 43–80)
NITRITE, URINE: NEGATIVE
PDW BLD-RTO: 14.6 FL (ref 11.5–15)
PH UA: 5.5 (ref 5–9)
PLATELET # BLD: 172 E9/L (ref 130–450)
PMV BLD AUTO: 9.8 FL (ref 7–12)
POTASSIUM SERPL-SCNC: 4.4 MMOL/L (ref 3.5–5)
PRO-BNP: 1271 PG/ML (ref 0–125)
PROTEIN UA: 100 MG/DL
PROTHROMBIN TIME: 11.6 SEC (ref 9.3–12.4)
RBC # BLD: 4.55 E12/L (ref 3.5–5.5)
RBC UA: ABNORMAL /HPF (ref 0–2)
SODIUM BLD-SCNC: 144 MMOL/L (ref 132–146)
SPECIFIC GRAVITY UA: >=1.03 (ref 1–1.03)
TOTAL PROTEIN: 6.6 G/DL (ref 6.4–8.3)
TROPONIN: <0.01 NG/ML (ref 0–0.03)
UROBILINOGEN, URINE: 0.2 E.U./DL
WBC # BLD: 5.1 E9/L (ref 4.5–11.5)
WBC UA: >20 /HPF (ref 0–5)

## 2018-07-14 PROCEDURE — 6370000000 HC RX 637 (ALT 250 FOR IP): Performed by: EMERGENCY MEDICINE

## 2018-07-14 PROCEDURE — 85610 PROTHROMBIN TIME: CPT

## 2018-07-14 PROCEDURE — 83735 ASSAY OF MAGNESIUM: CPT

## 2018-07-14 PROCEDURE — 84484 ASSAY OF TROPONIN QUANT: CPT

## 2018-07-14 PROCEDURE — 81001 URINALYSIS AUTO W/SCOPE: CPT

## 2018-07-14 PROCEDURE — 36415 COLL VENOUS BLD VENIPUNCTURE: CPT

## 2018-07-14 PROCEDURE — 99285 EMERGENCY DEPT VISIT HI MDM: CPT

## 2018-07-14 PROCEDURE — 83880 ASSAY OF NATRIURETIC PEPTIDE: CPT

## 2018-07-14 PROCEDURE — 80053 COMPREHEN METABOLIC PANEL: CPT

## 2018-07-14 PROCEDURE — 85730 THROMBOPLASTIN TIME PARTIAL: CPT

## 2018-07-14 PROCEDURE — 71046 X-RAY EXAM CHEST 2 VIEWS: CPT

## 2018-07-14 PROCEDURE — 70450 CT HEAD/BRAIN W/O DYE: CPT

## 2018-07-14 PROCEDURE — 85025 COMPLETE CBC W/AUTO DIFF WBC: CPT

## 2018-07-14 RX ORDER — SODIUM CHLORIDE 0.9 % (FLUSH) 0.9 %
10 SYRINGE (ML) INJECTION EVERY 12 HOURS SCHEDULED
Status: DISCONTINUED | OUTPATIENT
Start: 2018-07-15 | End: 2018-07-20 | Stop reason: HOSPADM

## 2018-07-14 RX ORDER — CETIRIZINE HYDROCHLORIDE 10 MG/1
5 TABLET ORAL DAILY
Status: DISCONTINUED | OUTPATIENT
Start: 2018-07-15 | End: 2018-07-20 | Stop reason: HOSPADM

## 2018-07-14 RX ORDER — CLOPIDOGREL BISULFATE 75 MG/1
75 TABLET ORAL DAILY
Status: DISCONTINUED | OUTPATIENT
Start: 2018-07-15 | End: 2018-07-20 | Stop reason: HOSPADM

## 2018-07-14 RX ORDER — FENOFIBRATE 160 MG/1
160 TABLET ORAL DAILY
Status: DISCONTINUED | OUTPATIENT
Start: 2018-07-15 | End: 2018-07-20 | Stop reason: HOSPADM

## 2018-07-14 RX ORDER — OXYCODONE HYDROCHLORIDE AND ACETAMINOPHEN 5; 325 MG/1; MG/1
1 TABLET ORAL ONCE
Status: COMPLETED | OUTPATIENT
Start: 2018-07-14 | End: 2018-07-14

## 2018-07-14 RX ORDER — AMLODIPINE BESYLATE 5 MG/1
5 TABLET ORAL DAILY
Status: DISCONTINUED | OUTPATIENT
Start: 2018-07-15 | End: 2018-07-14

## 2018-07-14 RX ORDER — ASPIRIN 325 MG
325 TABLET ORAL DAILY
Status: DISCONTINUED | OUTPATIENT
Start: 2018-07-15 | End: 2018-07-20 | Stop reason: HOSPADM

## 2018-07-14 RX ORDER — PANTOPRAZOLE SODIUM 40 MG/1
40 TABLET, DELAYED RELEASE ORAL DAILY
Status: DISCONTINUED | OUTPATIENT
Start: 2018-07-15 | End: 2018-07-20 | Stop reason: HOSPADM

## 2018-07-14 RX ORDER — ATORVASTATIN CALCIUM 40 MG/1
40 TABLET, FILM COATED ORAL NIGHTLY
Status: DISCONTINUED | OUTPATIENT
Start: 2018-07-15 | End: 2018-07-17

## 2018-07-14 RX ORDER — CITALOPRAM 20 MG/1
20 TABLET ORAL NIGHTLY
Status: DISCONTINUED | OUTPATIENT
Start: 2018-07-15 | End: 2018-07-20 | Stop reason: HOSPADM

## 2018-07-14 RX ORDER — GABAPENTIN 100 MG/1
100 CAPSULE ORAL 3 TIMES DAILY
Status: DISCONTINUED | OUTPATIENT
Start: 2018-07-15 | End: 2018-07-20 | Stop reason: HOSPADM

## 2018-07-14 RX ORDER — IPRATROPIUM BROMIDE AND ALBUTEROL SULFATE 2.5; .5 MG/3ML; MG/3ML
1 SOLUTION RESPIRATORY (INHALATION) EVERY 4 HOURS PRN
Status: DISCONTINUED | OUTPATIENT
Start: 2018-07-14 | End: 2018-07-20 | Stop reason: HOSPADM

## 2018-07-14 RX ORDER — OXYCODONE HYDROCHLORIDE 5 MG/1
15 TABLET ORAL EVERY 6 HOURS PRN
Status: DISCONTINUED | OUTPATIENT
Start: 2018-07-14 | End: 2018-07-20 | Stop reason: HOSPADM

## 2018-07-14 RX ORDER — SODIUM CHLORIDE 0.9 % (FLUSH) 0.9 %
10 SYRINGE (ML) INJECTION PRN
Status: DISCONTINUED | OUTPATIENT
Start: 2018-07-14 | End: 2018-07-20 | Stop reason: HOSPADM

## 2018-07-14 RX ORDER — ZOLPIDEM TARTRATE 5 MG/1
5 TABLET ORAL NIGHTLY PRN
Status: DISCONTINUED | OUTPATIENT
Start: 2018-07-15 | End: 2018-07-20 | Stop reason: HOSPADM

## 2018-07-14 RX ORDER — CLONAZEPAM 0.5 MG/1
0.5 TABLET ORAL 2 TIMES DAILY
Status: DISCONTINUED | OUTPATIENT
Start: 2018-07-15 | End: 2018-07-20 | Stop reason: HOSPADM

## 2018-07-14 RX ORDER — LISINOPRIL 10 MG/1
10 TABLET ORAL DAILY
Status: DISCONTINUED | OUTPATIENT
Start: 2018-07-15 | End: 2018-07-18

## 2018-07-14 RX ORDER — DILTIAZEM HYDROCHLORIDE 120 MG/1
120 CAPSULE, COATED, EXTENDED RELEASE ORAL DAILY
Status: DISCONTINUED | OUTPATIENT
Start: 2018-07-15 | End: 2018-07-19

## 2018-07-14 RX ORDER — ONDANSETRON 2 MG/ML
4 INJECTION INTRAMUSCULAR; INTRAVENOUS EVERY 6 HOURS PRN
Status: DISCONTINUED | OUTPATIENT
Start: 2018-07-14 | End: 2018-07-20 | Stop reason: HOSPADM

## 2018-07-14 RX ORDER — OYSTER SHELL CALCIUM WITH VITAMIN D 500; 200 MG/1; [IU]/1
1 TABLET, FILM COATED ORAL 2 TIMES DAILY
Status: DISCONTINUED | OUTPATIENT
Start: 2018-07-15 | End: 2018-07-20 | Stop reason: HOSPADM

## 2018-07-14 RX ADMIN — OXYCODONE HYDROCHLORIDE AND ACETAMINOPHEN 1 TABLET: 5; 325 TABLET ORAL at 22:22

## 2018-07-14 ASSESSMENT — ENCOUNTER SYMPTOMS
ABDOMINAL PAIN: 0
VOMITING: 0
NAUSEA: 0
BLOOD IN STOOL: 0
SHORTNESS OF BREATH: 0
COUGH: 0
BACK PAIN: 0

## 2018-07-14 ASSESSMENT — PAIN SCALES - GENERAL: PAINLEVEL_OUTOF10: 6

## 2018-07-15 ENCOUNTER — APPOINTMENT (OUTPATIENT)
Dept: ULTRASOUND IMAGING | Age: 73
DRG: 064 | End: 2018-07-15
Payer: MEDICARE

## 2018-07-15 ENCOUNTER — APPOINTMENT (OUTPATIENT)
Dept: NUCLEAR MEDICINE | Age: 73
DRG: 064 | End: 2018-07-15
Payer: MEDICARE

## 2018-07-15 ENCOUNTER — APPOINTMENT (OUTPATIENT)
Dept: CT IMAGING | Age: 73
DRG: 064 | End: 2018-07-15
Payer: MEDICARE

## 2018-07-15 PROBLEM — J96.02 ACUTE RESPIRATORY FAILURE WITH HYPERCAPNIA (HCC): Status: ACTIVE | Noted: 2018-07-15

## 2018-07-15 PROBLEM — I63.9 ACUTE CVA (CEREBROVASCULAR ACCIDENT) (HCC): Status: ACTIVE | Noted: 2018-07-15

## 2018-07-15 LAB
AADO2: 146.8 MMHG
ANION GAP SERPL CALCULATED.3IONS-SCNC: 12 MMOL/L (ref 7–16)
B.E.: 0.7 MMOL/L (ref -3–3)
B.E.: 0.7 MMOL/L (ref -3–3)
BUN BLDV-MCNC: 34 MG/DL (ref 8–23)
CALCIUM SERPL-MCNC: 8.8 MG/DL (ref 8.6–10.2)
CHLORIDE BLD-SCNC: 103 MMOL/L (ref 98–107)
CHOLESTEROL, TOTAL: 125 MG/DL (ref 0–199)
CO2: 26 MMOL/L (ref 22–29)
COHB: 2.1 % (ref 0–1.5)
COHB: 3.6 % (ref 0–1.5)
CREAT SERPL-MCNC: 1.6 MG/DL (ref 0.5–1)
CRITICAL: ABNORMAL
CRITICAL: ABNORMAL
D DIMER: 2853 NG/ML DDU
DATE ANALYZED: ABNORMAL
DATE ANALYZED: ABNORMAL
DATE OF COLLECTION: ABNORMAL
DATE OF COLLECTION: ABNORMAL
FIO2: 40 %
GFR AFRICAN AMERICAN: 38
GFR NON-AFRICAN AMERICAN: 32 ML/MIN/1.73
GLUCOSE BLD-MCNC: 89 MG/DL (ref 74–109)
HBA1C MFR BLD: 5.9 % (ref 4–5.6)
HCO3: 26.8 MMOL/L (ref 22–26)
HCO3: 29.3 MMOL/L (ref 22–26)
HDLC SERPL-MCNC: 27 MG/DL
HHB: 11 % (ref 0–5)
HHB: 6 % (ref 0–5)
LAB: 9558
LAB: ABNORMAL
LDL CHOLESTEROL CALCULATED: 65 MG/DL (ref 0–99)
LV EF: 65 %
LVEF MODALITY: NORMAL
Lab: 1608
Lab: 941
MAGNESIUM: 2.1 MG/DL (ref 1.6–2.6)
METER GLUCOSE: 129 MG/DL (ref 70–110)
METER GLUCOSE: 81 MG/DL (ref 70–110)
METER GLUCOSE: 92 MG/DL (ref 70–110)
METHB: 0.1 % (ref 0–1.5)
METHB: 0.1 % (ref 0–1.5)
MODE: ABNORMAL
MODE: ABNORMAL
O2 CONTENT: 15.7 ML/DL
O2 CONTENT: 16 ML/DL
O2 SATURATION: 88.6 % (ref 92–98.5)
O2 SATURATION: 93.9 % (ref 92–98.5)
O2HB: 85.3 % (ref 94–97)
O2HB: 91.8 % (ref 94–97)
OPERATOR ID: 2860
OPERATOR ID: ABNORMAL
PATIENT TEMP: 37 C
PATIENT TEMP: 37 C
PCO2: 49.5 MMHG (ref 35–45)
PCO2: 66.4 MMHG (ref 35–45)
PEEP/CPAP: 6 CMH?O
PFO2: 1.79 MMHG/%
PH BLOOD GAS: 7.26 (ref 7.35–7.45)
PH BLOOD GAS: 7.35 (ref 7.35–7.45)
PO2: 62.4 MMHG (ref 60–100)
PO2: 71.5 MMHG (ref 60–100)
POTASSIUM SERPL-SCNC: 4.6 MMOL/L (ref 3.5–5)
POTASSIUM SERPL-SCNC: 5.3 MMOL/L (ref 3.5–5)
PS: 14 CMH20
RI(T): 2.05
SODIUM BLD-SCNC: 141 MMOL/L (ref 132–146)
SOURCE, BLOOD GAS: ABNORMAL
SOURCE, BLOOD GAS: ABNORMAL
THB: 12.4 G/DL (ref 11.5–16.5)
THB: 13.1 G/DL (ref 11.5–16.5)
TIME ANALYZED: 1609
TIME ANALYZED: 944
TOTAL CK: 92 U/L (ref 20–180)
TRIGL SERPL-MCNC: 165 MG/DL (ref 0–149)
TROPONIN: <0.01 NG/ML (ref 0–0.03)
TSH SERPL DL<=0.05 MIU/L-ACNC: 2.31 UIU/ML (ref 0.27–4.2)
VLDLC SERPL CALC-MCNC: 33 MG/DL

## 2018-07-15 PROCEDURE — 72125 CT NECK SPINE W/O DYE: CPT

## 2018-07-15 PROCEDURE — 2580000003 HC RX 258: Performed by: FAMILY MEDICINE

## 2018-07-15 PROCEDURE — 94664 DEMO&/EVAL PT USE INHALER: CPT

## 2018-07-15 PROCEDURE — 82805 BLOOD GASES W/O2 SATURATION: CPT

## 2018-07-15 PROCEDURE — A9540 TC99M MAA: HCPCS | Performed by: RADIOLOGY

## 2018-07-15 PROCEDURE — 99223 1ST HOSP IP/OBS HIGH 75: CPT | Performed by: INTERNAL MEDICINE

## 2018-07-15 PROCEDURE — 94660 CPAP INITIATION&MGMT: CPT

## 2018-07-15 PROCEDURE — 84443 ASSAY THYROID STIM HORMONE: CPT

## 2018-07-15 PROCEDURE — 6370000000 HC RX 637 (ALT 250 FOR IP): Performed by: FAMILY MEDICINE

## 2018-07-15 PROCEDURE — 83036 HEMOGLOBIN GLYCOSYLATED A1C: CPT

## 2018-07-15 PROCEDURE — 2060000000 HC ICU INTERMEDIATE R&B

## 2018-07-15 PROCEDURE — 6360000002 HC RX W HCPCS: Performed by: FAMILY MEDICINE

## 2018-07-15 PROCEDURE — 93306 TTE W/DOPPLER COMPLETE: CPT

## 2018-07-15 PROCEDURE — 80048 BASIC METABOLIC PNL TOTAL CA: CPT

## 2018-07-15 PROCEDURE — 2700000000 HC OXYGEN THERAPY PER DAY

## 2018-07-15 PROCEDURE — 85378 FIBRIN DEGRADE SEMIQUANT: CPT

## 2018-07-15 PROCEDURE — 94640 AIRWAY INHALATION TREATMENT: CPT

## 2018-07-15 PROCEDURE — 93970 EXTREMITY STUDY: CPT

## 2018-07-15 PROCEDURE — 84132 ASSAY OF SERUM POTASSIUM: CPT

## 2018-07-15 PROCEDURE — 80061 LIPID PANEL: CPT

## 2018-07-15 PROCEDURE — 76770 US EXAM ABDO BACK WALL COMP: CPT

## 2018-07-15 PROCEDURE — 78582 LUNG VENTILAT&PERFUS IMAGING: CPT

## 2018-07-15 PROCEDURE — 82550 ASSAY OF CK (CPK): CPT

## 2018-07-15 PROCEDURE — 82962 GLUCOSE BLOOD TEST: CPT

## 2018-07-15 PROCEDURE — A9558 XE133 XENON 10MCI: HCPCS | Performed by: RADIOLOGY

## 2018-07-15 PROCEDURE — 6360000002 HC RX W HCPCS: Performed by: INTERNAL MEDICINE

## 2018-07-15 PROCEDURE — 99223 1ST HOSP IP/OBS HIGH 75: CPT | Performed by: PSYCHIATRY & NEUROLOGY

## 2018-07-15 PROCEDURE — 84484 ASSAY OF TROPONIN QUANT: CPT

## 2018-07-15 PROCEDURE — 3430000000 HC RX DIAGNOSTIC RADIOPHARMACEUTICAL: Performed by: RADIOLOGY

## 2018-07-15 PROCEDURE — 36415 COLL VENOUS BLD VENIPUNCTURE: CPT

## 2018-07-15 PROCEDURE — 93880 EXTRACRANIAL BILAT STUDY: CPT

## 2018-07-15 PROCEDURE — 36600 WITHDRAWAL OF ARTERIAL BLOOD: CPT

## 2018-07-15 PROCEDURE — 6370000000 HC RX 637 (ALT 250 FOR IP): Performed by: INTERNAL MEDICINE

## 2018-07-15 PROCEDURE — 83735 ASSAY OF MAGNESIUM: CPT

## 2018-07-15 RX ORDER — IPRATROPIUM BROMIDE AND ALBUTEROL SULFATE 2.5; .5 MG/3ML; MG/3ML
1 SOLUTION RESPIRATORY (INHALATION) 4 TIMES DAILY
Status: DISCONTINUED | OUTPATIENT
Start: 2018-07-15 | End: 2018-07-20 | Stop reason: HOSPADM

## 2018-07-15 RX ORDER — NICOTINE 21 MG/24HR
1 PATCH, TRANSDERMAL 24 HOURS TRANSDERMAL DAILY
Status: DISCONTINUED | OUTPATIENT
Start: 2018-07-15 | End: 2018-07-20 | Stop reason: HOSPADM

## 2018-07-15 RX ORDER — METHYLPREDNISOLONE SODIUM SUCCINATE 125 MG/2ML
50 INJECTION, POWDER, LYOPHILIZED, FOR SOLUTION INTRAMUSCULAR; INTRAVENOUS ONCE
Status: COMPLETED | OUTPATIENT
Start: 2018-07-15 | End: 2018-07-15

## 2018-07-15 RX ORDER — BUDESONIDE 0.5 MG/2ML
0.5 INHALANT ORAL 2 TIMES DAILY
Status: DISCONTINUED | OUTPATIENT
Start: 2018-07-15 | End: 2018-07-18

## 2018-07-15 RX ORDER — XENON XE-133 10 MCI/1
11.6 GAS RESPIRATORY (INHALATION)
Status: COMPLETED | OUTPATIENT
Start: 2018-07-15 | End: 2018-07-15

## 2018-07-15 RX ORDER — SODIUM CHLORIDE 9 MG/ML
INJECTION, SOLUTION INTRAVENOUS CONTINUOUS
Status: DISCONTINUED | OUTPATIENT
Start: 2018-07-15 | End: 2018-07-17

## 2018-07-15 RX ADMIN — BUDESONIDE 500 MCG: 0.5 SUSPENSION RESPIRATORY (INHALATION) at 21:00

## 2018-07-15 RX ADMIN — IPRATROPIUM BROMIDE AND ALBUTEROL SULFATE 1 AMPULE: 2.5; .5 SOLUTION RESPIRATORY (INHALATION) at 14:50

## 2018-07-15 RX ADMIN — CLOPIDOGREL 75 MG: 75 TABLET, FILM COATED ORAL at 08:14

## 2018-07-15 RX ADMIN — XENON XE-133 11.6 MILLICURIE: 10 GAS RESPIRATORY (INHALATION) at 18:15

## 2018-07-15 RX ADMIN — LEVOTHYROXINE SODIUM 137 MCG: 112 TABLET ORAL at 06:26

## 2018-07-15 RX ADMIN — METHYLPREDNISOLONE SODIUM SUCCINATE 50 MG: 125 INJECTION, POWDER, FOR SOLUTION INTRAMUSCULAR; INTRAVENOUS at 13:32

## 2018-07-15 RX ADMIN — FENOFIBRATE 160 MG: 160 TABLET ORAL at 08:13

## 2018-07-15 RX ADMIN — BUDESONIDE 500 MCG: 0.5 SUSPENSION RESPIRATORY (INHALATION) at 14:47

## 2018-07-15 RX ADMIN — PANTOPRAZOLE SODIUM 40 MG: 40 TABLET, DELAYED RELEASE ORAL at 08:13

## 2018-07-15 RX ADMIN — OXYCODONE HYDROCHLORIDE 15 MG: 5 TABLET ORAL at 04:43

## 2018-07-15 RX ADMIN — CALCIUM CARBONATE-VITAMIN D TAB 500 MG-200 UNIT 1 TABLET: 500-200 TAB at 08:14

## 2018-07-15 RX ADMIN — GABAPENTIN 100 MG: 100 CAPSULE ORAL at 08:15

## 2018-07-15 RX ADMIN — CLONAZEPAM 0.5 MG: 0.5 TABLET ORAL at 08:13

## 2018-07-15 RX ADMIN — ASPIRIN 325 MG: 325 TABLET ORAL at 08:14

## 2018-07-15 RX ADMIN — CETIRIZINE HYDROCHLORIDE 5 MG: 10 TABLET, FILM COATED ORAL at 08:13

## 2018-07-15 RX ADMIN — IPRATROPIUM BROMIDE AND ALBUTEROL SULFATE 1 AMPULE: 2.5; .5 SOLUTION RESPIRATORY (INHALATION) at 21:00

## 2018-07-15 RX ADMIN — LISINOPRIL 10 MG: 10 TABLET ORAL at 08:13

## 2018-07-15 RX ADMIN — GABAPENTIN 100 MG: 100 CAPSULE ORAL at 20:26

## 2018-07-15 RX ADMIN — SODIUM CHLORIDE: 9 INJECTION, SOLUTION INTRAVENOUS at 03:55

## 2018-07-15 RX ADMIN — LINAGLIPTIN 5 MG: 5 TABLET, FILM COATED ORAL at 08:13

## 2018-07-15 RX ADMIN — CALCIUM CARBONATE-VITAMIN D TAB 500 MG-200 UNIT 1 TABLET: 500-200 TAB at 20:27

## 2018-07-15 RX ADMIN — ATORVASTATIN CALCIUM 40 MG: 40 TABLET, FILM COATED ORAL at 20:27

## 2018-07-15 RX ADMIN — CITALOPRAM 20 MG: 20 TABLET, FILM COATED ORAL at 20:27

## 2018-07-15 RX ADMIN — Medication 7.6 MILLICURIE: at 18:39

## 2018-07-15 RX ADMIN — METOPROLOL TARTRATE 25 MG: 25 TABLET ORAL at 08:13

## 2018-07-15 RX ADMIN — CLONAZEPAM 0.5 MG: 0.5 TABLET ORAL at 20:26

## 2018-07-15 RX ADMIN — WATER 1 G: 1 INJECTION INTRAMUSCULAR; INTRAVENOUS; SUBCUTANEOUS at 02:07

## 2018-07-15 RX ADMIN — DILTIAZEM HYDROCHLORIDE 120 MG: 120 CAPSULE, COATED, EXTENDED RELEASE ORAL at 08:14

## 2018-07-15 ASSESSMENT — PAIN SCALES - GENERAL
PAINLEVEL_OUTOF10: 3
PAINLEVEL_OUTOF10: 0
PAINLEVEL_OUTOF10: 7

## 2018-07-15 NOTE — CONSULTS
Consult to Neurology  Consult performed by: Deborah Key ordered by: Laurel Wilson  Reason for consult: Stroke      Pt had cpap machine. She cannot talk fully and is sleepy. Based on the note by the other providers: Patient fell a week ago from stairs. No preceding dizziness. Has had facial injuries. For the past 2 days, she developed weakness in the LUE. No weakness of the legs. No speech impairment or dizziness. No headaches. No difficulty swallowing. PMH:  DM, HTN, HLP, CAD s/p stents and CABG, COPD  In 2016 she had an episode of confusion for which MRI showed scattered shower of emboli in right MCA teritory. At that time Right ICA was seen to be occluded but no significant abnormality in the left side. FH:  Noncontributory    DH:  Already on  mg, Plavix 75 mg and Lipitor 40 mg at home    Allergy: Per EMR    ROS cannot be checked as she is sleepy    VS:  Per EMR    CTAB  RRR    Neurology exam is limited. She is sleepy but can be awakned. follows commands, no aphasia/dysarthria. Eyes in midline. EOMI, VFF, normal saccades and pursuit, no gaze preference/nystagmus. Intact facial sensation, no asymmetry. Tongue midline. Symmetric palate elevation. Neck muscles and shoulder shrug 5/5. Sensation: intact all over (PP, LT, pain, vibration and proprioception), no neglect. Motor: 5/5 all extremities except in LUE is 2-3/5. Normal bulk and tone  DTRs: +1 biceps/triceps/BR/Knees, +1 ankles, plantars down B/L. Coordination: intact F-N   Gait: Was not tested    CT head does not show any acute process    Estimated stenosis by NASCET criteria in the proximal right carotid   artery is occluded   Estimated stenosis by NASCET criteria in the proximal left carotid   artery is between 50% to 69%. Assessment:  Suspect it is a stroke although a peripheral injury cannot be ruled out. This is a complicated case. If brain MRI can be done, we can see if stroke is present or not.   If stroke is present in right brain, corresponding to left arm weakness, she was already optimally treated medically with dual antiplatelet yet failed this. Her LDL is 65 and hbA1C is 5.9 therefore there is no more room more medical management. On the other hand we know her KATIA is occluded but her left ICA is stenotic 50-69%. This is worse than 2 years ago which was not stenotic at all. But still it does not fall into surgical category especially due to her gender. That would be really risky to do a procedure on a 50-69% stenotic carotid when the other is fully occluded. The CEA trials showed only modest benefit for that degree of stenosis but they didn't have a fully occluded carotid on the other side. So subjecting a patient to this procedure could risk a periprocedural stroke that could be debilatating versus only modest benefit. A CT perfusion and/or acetazolamide scan can be done to decide if the stenosis is clinically significant and consider stenting. Also a Plavix response test can be done to see that if this pt is truly responsive or resistant to Plavix and consider a different AP like Brilinta or Prasugrel if the test shows Plavix resistance. CT neck was non significant but I still suggest cervical spine MRI to rule out any pathology in there explaining the LUE weakness. Please, if possible, consider replacing PPI with H2 blocker as it decreases the bioavailability of Plavix.     Yessenia Sheikh MD  4:33 PM

## 2018-07-15 NOTE — ED PROVIDER NOTES
Is a 76-year-old female presenting to ED with left extremity weakness is present for 3 days. Patient states that one week ago she fell while climbing up cement stairs in her backyard. Patient had injuries to her right lower extremity but was able to get up and move on her own without issue. Patient states 3 days ago she had sudden weakness and dizziness started. Patient she is able to move her left arm but states he is unable to old objects is unable to keep her arm in a still position. Review of Systems   Constitutional: Negative for chills and fever. Respiratory: Negative for cough and shortness of breath. Cardiovascular: Negative for chest pain. Gastrointestinal: Negative for abdominal pain, blood in stool, nausea and vomiting. Genitourinary: Negative for dysuria and frequency. Musculoskeletal: Positive for arthralgias. Negative for back pain. Skin: Positive for wound. Negative for rash. Neurological: Positive for weakness. Negative for headaches. All other systems reviewed and are negative. Physical Exam   Constitutional: She is oriented to person, place, and time. She appears well-developed and well-nourished. No distress. HENT:   Head: Normocephalic and atraumatic. Eyes: Conjunctivae are normal.   Neck: Normal range of motion. Neck supple. Cardiovascular: Normal rate, regular rhythm and normal heart sounds. No murmur heard. Pulmonary/Chest: Effort normal and breath sounds normal. No respiratory distress. She has no wheezes. She has no rales. Abdominal: Soft. Bowel sounds are normal. There is no tenderness. There is no rebound and no guarding. Musculoskeletal: She exhibits no edema or deformity. Neurological: She is alert and oriented to person, place, and time. No cranial nerve deficit. She exhibits normal muscle tone. Coordination abnormal.   Left extremity drift present. NIH= 1   Skin: Skin is warm. She is not diaphoretic.    Nursing note and vitals 170/75 98 °F (36.7 °C) Temporal 63 18 93 % - -   07/15/18 0214 (!) 112/49 98.1 °F (36.7 °C) - 61 16 94 % - -   07/14/18 2328 (!) 161/74 - - 76 18 95 % - -   07/14/18 2230 (!) 155/70 - - 68 14 94 % - -   07/14/18 2024 (!) 153/71 98 °F (36.7 °C) Oral 84 18 93 % 5' (1.524 m) 133 lb (60.3 kg)       Oxygen Saturation Interpretation: Normal    ------------------------------------------ PROGRESS NOTES ------------------------------------------  Re-evaluation(s):  Time: 12:20  Patients symptoms show no change  Repeat physical examination is not changed    Counseling:  I have spoken with the patient and discussed todays results, in addition to providing specific details for the plan of care and counseling regarding the diagnosis and prognosis. Their questions are answered at this time and they are agreeable with the plan of admission.    --------------------------------- ADDITIONAL PROVIDER NOTES ---------------------------------  Consultations:  See above    This patient's ED course included: a personal history and physicial examination, re-evaluation prior to disposition, IV medications and cardiac monitoring    This patient has remained hemodynamically stable during their ED course. Diagnosis:  1. Stroke-like symptoms        Disposition:  Patient's disposition: Admit to telemetry  Patient's condition is stable.                 Harley Apple DO  Resident  07/15/18 [id number]

## 2018-07-15 NOTE — H&P
(JANUVIA) 50 MG tablet Take 50 mg by mouth daily    Historical Provider, MD   traMADol (ULTRAM) 50 MG tablet Take 50 mg by mouth 2 times daily as needed for Pain     Historical Provider, MD   fenofibrate (TRICOR) 145 MG tablet Take 145 mg by mouth daily    Historical Provider, MD   zolpidem (AMBIEN) 10 MG tablet Take 10 mg by mouth nightly as needed for Sleep    Historical Provider, MD   albuterol (PROVENTIL HFA;VENTOLIN HFA) 108 (90 BASE) MCG/ACT inhaler Inhale 1 puff into the lungs 4 times daily as needed for Wheezing     Historical Provider, MD   oxyCODONE (OXY-IR) 15 MG immediate release tablet Take 15 mg by mouth 4 times daily  . Historical Provider, MD   insulin glargine (LANTUS) 100 UNIT/ML injection Inject 12 Units into the skin nightly. Historical Provider, MD   calcium-vitamin D (OSCAL-500) 500-200 MG-UNIT per tablet Take 1 tablet by mouth 2 times daily. Historical Provider, MD   lisinopril (PRINIVIL;ZESTRIL) 10 MG tablet Take 10 mg by mouth daily. Historical Provider, MD   amLODIPine (NORVASC) 5 MG tablet Take 5 mg by mouth daily. Historical Provider, MD   aspirin 325 MG tablet Take 325 mg by mouth daily. Historical Provider, MD   citalopram (CELEXA) 20 MG tablet Take 20 mg by mouth nightly. Historical Provider, MD   pantoprazole (PROTONIX) 40 MG tablet Take 40 mg by mouth daily. Historical Provider, MD       Allergies:  Patient has no known allergies. Social History:      The patient currently lives At home. Here with family members. TOBACCO:   reports that she has been smoking. She has a 82.50 pack-year smoking history. She has quit using smokeless tobacco.  ETOH:   reports that she does not drink alcohol.       Family History:     Reviewed in detail Positive as follows:    Family History   Problem Relation Age of Onset    Heart Attack Mother     Hypertension Mother     Heart Attack Father     Cancer Father     COPD Father     Cancer Sister     Other Brother     COPD WBCUA >20 07/14/2018    BACTERIA MANY 07/14/2018    RBCUA 2-5 07/14/2018    RBCUA NONE 08/17/2013    BLOODU SMALL 07/14/2018    SPECGRAV >=1.030 07/14/2018    GLUCOSEU Negative 07/14/2018       Radiology:     CXR: I have reviewed the CXR with the following interpretation: No acute cardiopulmonary process. XR CHEST STANDARD (2 VW)   Final Result   Tortuous ectatic aorta                     CT Head WO Contrast   Final Result   No acute intracranial hemorrhage. ASSESSMENT:    Active Hospital Problems    Diagnosis Date Noted    Left arm weakness [R29.898] 07/14/2018    Stroke-like symptoms [R29.90] 07/14/2018    Acute kidney injury superimposed on chronic kidney disease ([facility] [address] [N17.9, N18.9] 07/14/2018    UTI (urinary tract infection) [N39.0] 07/14/2018    Type 2 diabetes mellitus with renal complication (HCC) [J40.35] 07/14/2018    HTN (hypertension) [I10] 07/14/2018    HLD (hyperlipidemia) [E78.5] 07/14/2018    COPD (chronic obstructive pulmonary disease) ([facility] [address] [J44.9] 07/14/2018    CAD (coronary artery disease) [I25.10] 07/14/2018    Hypothyroidism [E03.9] 07/14/2018    Tobacco dependence [F17.200] 07/14/2018    Hernia of anterior abdominal wall [K43.9] 07/14/2018    History of recent fall [Z91.81] 07/14/2018    Soft tissue injury [T14.90XA] 07/14/2018     PLAN:  #1. Suspected acute CVA. Patient with left arm weakness. Unfortunately, due to multiple metallic devices in her including sternal wiring, stents, mesh in the abdomen and artificial knees, MRI would not be ordered at this time. Neurology consultation, echo and carotid studies. If this is a stroke, patient is currently on full strength aspirin and Plavix, she may need to go on Coumadin. Would defer to neurology on this. Check baseline EKG. Check ECHO and carotids. Did not order CTA head/neck given MICHELLE. #2. Acute kidney injury superimposed on chronic kidney disease. Gentle fluids overnight, renal ultrasound.  Monitor I's and O's and daily weights. Hold Lasix. #3. UTI. IV Rocephin, follow urine culture. #4. Type II diabetes with renal complication. Patient stopped taking her insulin on her own 6 months ago. Blood sugars controlled. Check A1c, continue with tradjenta. Sliding scale coverage. Monitor blood sugars. #5. Soft tissue injury of face and arm secondary to recent accidental fall. 6. Left arm weakness. Obtain CT scan of the cervical spine given recent trauma. #7. Hypertension, uncontrolled, resume home meds. Hold Norvasc since patient is on diltiazem as both are calcium channel blockers. #8. COPD with mild reactive airways. Breathing treatments as needed. #9. Hyperlipidemia, patient is on fibroids. I think she needs statin. Right now, she is complaining of leg cramps. We'll monitor this. If able to tolerate statin will commence it. #10. Bilateral leg cramps. Check magnesium and d-dimer. #11. Hypothyroidism. Continue Synthroid, check TSH. #12 coronary artery disease status post CABG. Chronic chest pain from open-heart surgery. Pain control. Cycle enzymes. Check baseline EKG. #13. Anterior abdominal wall hernia with some pain. Monitor. Moving bowels. #14. Tobacco dependence. Smoking cessation. Nicotine patch. DVT Prophylaxis: Heparin  Diet:   ADAt  Code Status: Prior FULL    PT/OT Eval Status: eval and treat    Dispo - inpx/alex Delgado MD    Thank you Savita Burns DO for the opportunity to be involved in this patient's care.

## 2018-07-15 NOTE — PROGRESS NOTES
Hospitalist Progress Note      PCP: Gilma Quesada DO    Date of Admission: 7/14/2018  Days in the hospital: 0    Chief Complaint: Left upper extremity weakness, acute respiratory failure with hypercapnia    Hospital Course:     Neurology consulted. Placed on rocephin for UTI. VQ scan ordered for shortness of breath and elevated D-dimer. Subjective  Patient seen and examined at bedside. Patient states that she is tired today. ABG showed respiratory acidosis. States that she did not sleep well last night. Patient denies any fevers, chills, chest pain, nausea, vomiting.       Medications:  Reviewed    Infusion Medications    sodium chloride 75 mL/hr at 07/15/18 0355     Scheduled Medications    insulin lispro  0-10 Units Subcutaneous 4x Daily AC & HS    nicotine  1 patch Transdermal Daily    aspirin  325 mg Oral Daily    atorvastatin  40 mg Oral Nightly    calcium-vitamin D  1 tablet Oral BID    citalopram  20 mg Oral Nightly    clonazePAM  0.5 mg Oral BID    clopidogrel  75 mg Oral Daily    diltiazem  120 mg Oral Daily    fenofibrate  160 mg Oral Daily    gabapentin  100 mg Oral TID    levothyroxine  137 mcg Oral Daily    lisinopril  10 mg Oral Daily    cetirizine  5 mg Oral Daily    metoprolol tartrate  25 mg Oral Daily    pantoprazole  40 mg Oral Daily    linagliptin  5 mg Oral Daily    sodium chloride flush  10 mL Intravenous 2 times per day    cefTRIAXone (ROCEPHIN) IV  1 g Intravenous Q24H     PRN Meds: oxyCODONE, zolpidem, sodium chloride flush, magnesium hydroxide, ondansetron, ipratropium-albuterol      Intake/Output Summary (Last 24 hours) at 07/15/18 1209  Last data filed at 07/15/18 0600   Gross per 24 hour   Intake           156.25 ml   Output                0 ml   Net           156.25 ml       Exam:    BP (!) 158/78   Pulse 64   Temp 97.9 °F (36.6 °C)   Resp 14   Ht 5' (1.524 m)   Wt 153 lb (69.4 kg)   SpO2 (!) 89%   BMI 29.88 kg/m²     General appearance: No apparent distress, appears stated age and cooperative. HEENT: Conjunctivae/corneas clear. Pupils equal and round. No injections noted. Neck: Supple. No lesions, scars or masses. Trachea midline. Respiratory:  Normal respiratory effort. Diminished air movement with few scattered Wheezes/Rhonchi. Cardiovascular: Regular rate and rhythm with normal S1/S2 without murmurs, rubs or gallops. Abdomen: Soft, non-tender, non-distended with normal bowel sounds. Musculoskeletal: No clubbing, cyanosis or edema bilaterally. Brisk capillary refill. 2+ lower extremity pulses (dorsalis pedis). Skin:  No rashes. Bruises of face and left forearm. Neurologic: awake, lethargic but following commands       Labs:   Recent Labs      07/14/18 2215   WBC  5.1   HGB  13.6   HCT  43.0   PLT  172     Recent Labs      07/14/18   2215  07/15/18   0802  07/15/18   1042   NA  144  141   --    K  4.4  5.3*  4.6   CL  104  103   --    CO2  30*  26   --    BUN  36*  34*   --    CREATININE  1.8*  1.6*   --    CALCIUM  9.4  8.8   --      Recent Labs      07/14/18 2215   AST  17   ALT  14   BILITOT  0.4   ALKPHOS  74     Recent Labs      07/14/18 2215   INR  1.0     Recent Labs      07/14/18   2215  07/15/18   0802   CKTOTAL   --   80   TROPONINI  <0.01   --        Imaging:  US RETROPERITONEAL COMPLETE   Final Result   Right renal atrophy         US CAROTID ARTERY BILATERAL   Final Result   Atherosclerotic disease. No hemodynamically significant stenosis is   identified   Estimated stenosis by NASCET criteria in the proximal right carotid   artery is occluded   Estimated stenosis by NASCET criteria in the proximal left carotid   artery is between 50% to 69%. Elevated velocity in the left vertebral artery suggesting stenosis,   correlation with MR angiogram or CT angiogram may be useful         CT CERVICAL SPINE WO CONTRAST   Final Result     No CT evidence of acute C-spine injury.  Cervical spondylosis/degenerative    disc disease as Chief Hospitalist  Please contact me through perfect serve    NOTE: This report was transcribed using voice recognition software. Every effort was made to ensure accuracy; however, inadvertent computerized transcription errors may be present.

## 2018-07-15 NOTE — ED NOTES
Black eye noted with slight swelling above left eye. Dried scabs noted left knee and bilateral forearms with bruises at various stages noted to bilateral arms. Patient states she got with fall over a week ago and likes to pick at the scabs.        Wilver Villegas RN  07/14/18 7559

## 2018-07-15 NOTE — PLAN OF CARE
Problem: Pain:  Goal: Control of acute pain  Control of acute pain   Outcome: Met This Shift      Problem: HEMODYNAMIC STATUS  Goal: Patient has stable vital signs and fluid balance  Outcome: Met This Shift      Problem: ACTIVITY INTOLERANCE/IMPAIRED MOBILITY  Goal: Mobility/activity is maintained at optimum level for patient  Outcome: Met This Shift      Problem: COMMUNICATION IMPAIRMENT  Goal: Ability to express needs and understand communication  Outcome: Met This Shift

## 2018-07-15 NOTE — ED NOTES
Assisted patient with changing into gown. Dr Prabha Canas in to assess patient. Will draw labs when doctor done with assessment.       Nelson Gallardo RN  07/14/18 [id number]

## 2018-07-15 NOTE — PROGRESS NOTES
mouth daily    Historical Provider, MD   traMADol (ULTRAM) 50 MG tablet Take 50 mg by mouth 2 times daily as needed for Pain     Historical Provider, MD   fenofibrate (TRICOR) 145 MG tablet Take 145 mg by mouth daily    Historical Provider, MD   zolpidem (AMBIEN) 10 MG tablet Take 10 mg by mouth nightly as needed for Sleep    Historical Provider, MD   albuterol (PROVENTIL HFA;VENTOLIN HFA) 108 (90 BASE) MCG/ACT inhaler Inhale 1 puff into the lungs 4 times daily as needed for Wheezing     Historical Provider, MD   oxyCODONE (OXY-IR) 15 MG immediate release tablet Take 15 mg by mouth 4 times daily  . Historical Provider, MD   insulin glargine (LANTUS) 100 UNIT/ML injection Inject 12 Units into the skin nightly. Historical Provider, MD   calcium-vitamin D (OSCAL-500) 500-200 MG-UNIT per tablet Take 1 tablet by mouth 2 times daily. Historical Provider, MD   lisinopril (PRINIVIL;ZESTRIL) 10 MG tablet Take 10 mg by mouth daily. Historical Provider, MD   amLODIPine (NORVASC) 5 MG tablet Take 5 mg by mouth daily. Historical Provider, MD   aspirin 325 MG tablet Take 325 mg by mouth daily. Historical Provider, MD   citalopram (CELEXA) 20 MG tablet Take 20 mg by mouth nightly. Historical Provider, MD   pantoprazole (PROTONIX) 40 MG tablet Take 40 mg by mouth daily. Historical Provider, MD       ALLERGIES:  Patient has no known allergies. REVIEW OF SYSTEMS:   Constitutional: No unanticipated weight loss. There's been no change in energy level, sleep pattern or activity level. No fever chills or rigors. Eyes: No visual changes or diplopia. No scleral icterus. ENT: No Headaches, hearing loss or vertigo. No mouth sores or sore throat. No change in taste or smell. Cardiovascular: No chest discomfort, dyspnea on exertion, palpitations, loss of consciousness, no phlebitis, no claudication. Respiratory: No cough, wheezing or sputum production. No hemoptysis, pleuritic pain.   Gastrointestinal: No deep white matter hypodensities, likely related to chronic microvascular ischemic changes. Right basal ganglia old lacunar infarct noted. Ventricular and cisternal spaces are normal in size, shape and configuration for a patient of this age. No dominant mass, midline shift, hydrocephalus, extra-axial fluid collection or acute hemorrhage. No acute sinus disease. Skull is intact. Intraorbital contents are unremarkable     No acute intracranial hemorrhage. Ct Cervical Spine Wo Contrast    Result Date: 7/15/2018  EXAM:   CT Cervical Spine Without Intravenous Contrast CLINICAL HISTORY:   67years old, female; Signs and symptoms; Weakness; Additional info: Trauma, left arm weakness. TECHNIQUE:   Thin section images through the cervical spine in transaxial plane with sagittal and coronal reformations. Coronal and sagittal reformatted images were created and reviewed. All CT scans at this facility use at least one of these dose optimization techniques: automated exposure control; mA and/or kV adjustment per patient size (includes targeted exams where dose is matched to clinical indication); or iterative reconstruction. COMPARISON:   No relevant prior studies available. FINDINGS:   No fracture, traumatic subluxation or other evidence of acute bony injury. No abnormal prevertebral/paraspinous soft tissue swelling. There is cervical spondylosis/degenerative disc disease present with multilevel mild canal indentation/narrowing due to areas of disc bulge/protrusion, spur C3-4, C4-5, C5-6, C6-7 as well as areas of neural foraminal narrowing due to osteophyte formation particularly on the right side at C3-4, C4-5, bilaterally at C5-6, on the left side at C6-7. Slight anterolisthesis C4 on C5, retrolisthesis C5 on C6, most likely on a degenerative basis. No CT evidence of acute C-spine injury. Cervical spondylosis/degenerative disc disease as detailed above.  Areas of minimal spondylolisthesis most likely degenerative as

## 2018-07-16 ENCOUNTER — APPOINTMENT (OUTPATIENT)
Dept: MRI IMAGING | Age: 73
DRG: 064 | End: 2018-07-16
Payer: MEDICARE

## 2018-07-16 LAB
ANION GAP SERPL CALCULATED.3IONS-SCNC: 13 MMOL/L (ref 7–16)
BASOPHILS ABSOLUTE: 0 E9/L (ref 0–0.2)
BASOPHILS RELATIVE PERCENT: 0.2 % (ref 0–2)
BUN BLDV-MCNC: 38 MG/DL (ref 8–23)
CALCIUM SERPL-MCNC: 9.1 MG/DL (ref 8.6–10.2)
CHLORIDE BLD-SCNC: 102 MMOL/L (ref 98–107)
CHOLESTEROL, TOTAL: 141 MG/DL (ref 0–199)
CO2: 18 MMOL/L (ref 22–29)
CREAT SERPL-MCNC: 1.5 MG/DL (ref 0.5–1)
EOSINOPHILS ABSOLUTE: 0 E9/L (ref 0.05–0.5)
EOSINOPHILS RELATIVE PERCENT: 0 % (ref 0–6)
GFR AFRICAN AMERICAN: 41
GFR NON-AFRICAN AMERICAN: 34 ML/MIN/1.73
GLUCOSE BLD-MCNC: 235 MG/DL (ref 74–109)
HCT VFR BLD CALC: 38.2 % (ref 34–48)
HDLC SERPL-MCNC: 33 MG/DL
HEMOGLOBIN: 12.2 G/DL (ref 11.5–15.5)
LDL CHOLESTEROL CALCULATED: 76 MG/DL (ref 0–99)
LYMPHOCYTES ABSOLUTE: 0.21 E9/L (ref 1.5–4)
LYMPHOCYTES RELATIVE PERCENT: 4.4 % (ref 20–42)
MCH RBC QN AUTO: 30.1 PG (ref 26–35)
MCHC RBC AUTO-ENTMCNC: 31.9 % (ref 32–34.5)
MCV RBC AUTO: 94.3 FL (ref 80–99.9)
METER GLUCOSE: 176 MG/DL (ref 70–110)
METER GLUCOSE: 180 MG/DL (ref 70–110)
METER GLUCOSE: 266 MG/DL (ref 70–110)
METER GLUCOSE: 336 MG/DL (ref 70–110)
MONOCYTES ABSOLUTE: 0.11 E9/L (ref 0.1–0.95)
MONOCYTES RELATIVE PERCENT: 1.8 % (ref 2–12)
MYELOCYTE PERCENT: 0.9 % (ref 0–0)
NEUTROPHILS ABSOLUTE: 4.98 E9/L (ref 1.8–7.3)
NEUTROPHILS RELATIVE PERCENT: 93 % (ref 43–80)
PDW BLD-RTO: 13.9 FL (ref 11.5–15)
PLATELET # BLD: 166 E9/L (ref 130–450)
PMV BLD AUTO: 10 FL (ref 7–12)
POTASSIUM SERPL-SCNC: 4.9 MMOL/L (ref 3.5–5)
POTASSIUM SERPL-SCNC: 5.7 MMOL/L (ref 3.5–5)
RBC # BLD: 4.05 E12/L (ref 3.5–5.5)
RBC # BLD: NORMAL 10*6/UL
SODIUM BLD-SCNC: 133 MMOL/L (ref 132–146)
TRIGL SERPL-MCNC: 162 MG/DL (ref 0–149)
VLDLC SERPL CALC-MCNC: 32 MG/DL
WBC # BLD: 5.3 E9/L (ref 4.5–11.5)

## 2018-07-16 PROCEDURE — 97530 THERAPEUTIC ACTIVITIES: CPT

## 2018-07-16 PROCEDURE — 97535 SELF CARE MNGMENT TRAINING: CPT

## 2018-07-16 PROCEDURE — 6370000000 HC RX 637 (ALT 250 FOR IP): Performed by: INTERNAL MEDICINE

## 2018-07-16 PROCEDURE — G8979 MOBILITY GOAL STATUS: HCPCS

## 2018-07-16 PROCEDURE — 2700000000 HC OXYGEN THERAPY PER DAY

## 2018-07-16 PROCEDURE — 80048 BASIC METABOLIC PNL TOTAL CA: CPT

## 2018-07-16 PROCEDURE — 6360000002 HC RX W HCPCS: Performed by: FAMILY MEDICINE

## 2018-07-16 PROCEDURE — 94640 AIRWAY INHALATION TREATMENT: CPT

## 2018-07-16 PROCEDURE — G8987 SELF CARE CURRENT STATUS: HCPCS

## 2018-07-16 PROCEDURE — 6370000000 HC RX 637 (ALT 250 FOR IP): Performed by: FAMILY MEDICINE

## 2018-07-16 PROCEDURE — 6360000002 HC RX W HCPCS: Performed by: INTERNAL MEDICINE

## 2018-07-16 PROCEDURE — 82962 GLUCOSE BLOOD TEST: CPT

## 2018-07-16 PROCEDURE — 2580000003 HC RX 258: Performed by: FAMILY MEDICINE

## 2018-07-16 PROCEDURE — 97161 PT EVAL LOW COMPLEX 20 MIN: CPT

## 2018-07-16 PROCEDURE — 80061 LIPID PANEL: CPT

## 2018-07-16 PROCEDURE — 94660 CPAP INITIATION&MGMT: CPT

## 2018-07-16 PROCEDURE — 2060000000 HC ICU INTERMEDIATE R&B

## 2018-07-16 PROCEDURE — 36415 COLL VENOUS BLD VENIPUNCTURE: CPT

## 2018-07-16 PROCEDURE — 99222 1ST HOSP IP/OBS MODERATE 55: CPT | Performed by: SURGERY

## 2018-07-16 PROCEDURE — 99233 SBSQ HOSP IP/OBS HIGH 50: CPT | Performed by: INTERNAL MEDICINE

## 2018-07-16 PROCEDURE — G8988 SELF CARE GOAL STATUS: HCPCS

## 2018-07-16 PROCEDURE — 97166 OT EVAL MOD COMPLEX 45 MIN: CPT

## 2018-07-16 PROCEDURE — G8978 MOBILITY CURRENT STATUS: HCPCS

## 2018-07-16 PROCEDURE — 99232 SBSQ HOSP IP/OBS MODERATE 35: CPT | Performed by: NURSE PRACTITIONER

## 2018-07-16 PROCEDURE — 84132 ASSAY OF SERUM POTASSIUM: CPT

## 2018-07-16 PROCEDURE — 72141 MRI NECK SPINE W/O DYE: CPT

## 2018-07-16 PROCEDURE — 70551 MRI BRAIN STEM W/O DYE: CPT

## 2018-07-16 PROCEDURE — 85025 COMPLETE CBC W/AUTO DIFF WBC: CPT

## 2018-07-16 RX ORDER — METHYLPREDNISOLONE SODIUM SUCCINATE 125 MG/2ML
50 INJECTION, POWDER, LYOPHILIZED, FOR SOLUTION INTRAMUSCULAR; INTRAVENOUS ONCE
Status: COMPLETED | OUTPATIENT
Start: 2018-07-16 | End: 2018-07-16

## 2018-07-16 RX ORDER — LORAZEPAM 0.5 MG/1
0.5 TABLET ORAL
Status: COMPLETED | OUTPATIENT
Start: 2018-07-16 | End: 2018-07-16

## 2018-07-16 RX ORDER — ALBUTEROL SULFATE 90 UG/1
2 AEROSOL, METERED RESPIRATORY (INHALATION) EVERY 6 HOURS PRN
Status: DISCONTINUED | OUTPATIENT
Start: 2018-07-16 | End: 2018-07-20 | Stop reason: HOSPADM

## 2018-07-16 RX ADMIN — WATER 1 G: 1 INJECTION INTRAMUSCULAR; INTRAVENOUS; SUBCUTANEOUS at 00:16

## 2018-07-16 RX ADMIN — ASPIRIN 325 MG: 325 TABLET ORAL at 08:42

## 2018-07-16 RX ADMIN — BUDESONIDE 500 MCG: 0.5 SUSPENSION RESPIRATORY (INHALATION) at 10:51

## 2018-07-16 RX ADMIN — CALCIUM CARBONATE-VITAMIN D TAB 500 MG-200 UNIT 1 TABLET: 500-200 TAB at 08:42

## 2018-07-16 RX ADMIN — LISINOPRIL 10 MG: 10 TABLET ORAL at 08:42

## 2018-07-16 RX ADMIN — CLONAZEPAM 0.5 MG: 0.5 TABLET ORAL at 20:07

## 2018-07-16 RX ADMIN — CETIRIZINE HYDROCHLORIDE 5 MG: 10 TABLET, FILM COATED ORAL at 08:42

## 2018-07-16 RX ADMIN — CITALOPRAM 20 MG: 20 TABLET, FILM COATED ORAL at 20:07

## 2018-07-16 RX ADMIN — FENOFIBRATE 160 MG: 160 TABLET ORAL at 08:41

## 2018-07-16 RX ADMIN — METOPROLOL TARTRATE 25 MG: 25 TABLET ORAL at 08:41

## 2018-07-16 RX ADMIN — LEVOTHYROXINE SODIUM 137 MCG: 112 TABLET ORAL at 06:58

## 2018-07-16 RX ADMIN — ATORVASTATIN CALCIUM 40 MG: 40 TABLET, FILM COATED ORAL at 20:08

## 2018-07-16 RX ADMIN — METHYLPREDNISOLONE SODIUM SUCCINATE 50 MG: 125 INJECTION, POWDER, FOR SOLUTION INTRAMUSCULAR; INTRAVENOUS at 14:39

## 2018-07-16 RX ADMIN — CLOPIDOGREL 75 MG: 75 TABLET, FILM COATED ORAL at 08:42

## 2018-07-16 RX ADMIN — PANTOPRAZOLE SODIUM 40 MG: 40 TABLET, DELAYED RELEASE ORAL at 08:46

## 2018-07-16 RX ADMIN — IPRATROPIUM BROMIDE AND ALBUTEROL SULFATE 1 AMPULE: 2.5; .5 SOLUTION RESPIRATORY (INHALATION) at 10:52

## 2018-07-16 RX ADMIN — LINAGLIPTIN 5 MG: 5 TABLET, FILM COATED ORAL at 08:41

## 2018-07-16 RX ADMIN — OXYCODONE HYDROCHLORIDE 15 MG: 5 TABLET ORAL at 10:49

## 2018-07-16 RX ADMIN — DILTIAZEM HYDROCHLORIDE 120 MG: 120 CAPSULE, COATED, EXTENDED RELEASE ORAL at 08:42

## 2018-07-16 RX ADMIN — IPRATROPIUM BROMIDE AND ALBUTEROL SULFATE 1 AMPULE: 2.5; .5 SOLUTION RESPIRATORY (INHALATION) at 22:40

## 2018-07-16 RX ADMIN — CLONAZEPAM 0.5 MG: 0.5 TABLET ORAL at 08:46

## 2018-07-16 RX ADMIN — GABAPENTIN 100 MG: 100 CAPSULE ORAL at 08:42

## 2018-07-16 RX ADMIN — GABAPENTIN 100 MG: 100 CAPSULE ORAL at 14:39

## 2018-07-16 RX ADMIN — CALCIUM CARBONATE-VITAMIN D TAB 500 MG-200 UNIT 1 TABLET: 500-200 TAB at 20:08

## 2018-07-16 RX ADMIN — LORAZEPAM 0.5 MG: 0.5 TABLET ORAL at 16:20

## 2018-07-16 RX ADMIN — INSULIN LISPRO 8 UNITS: 100 INJECTION, SOLUTION INTRAVENOUS; SUBCUTANEOUS at 20:08

## 2018-07-16 RX ADMIN — BUDESONIDE 500 MCG: 0.5 SUSPENSION RESPIRATORY (INHALATION) at 22:41

## 2018-07-16 RX ADMIN — GABAPENTIN 100 MG: 100 CAPSULE ORAL at 20:07

## 2018-07-16 ASSESSMENT — PAIN SCALES - GENERAL
PAINLEVEL_OUTOF10: 0
PAINLEVEL_OUTOF10: 8
PAINLEVEL_OUTOF10: 0

## 2018-07-16 ASSESSMENT — PAIN DESCRIPTION - LOCATION: LOCATION: CHEST

## 2018-07-16 ASSESSMENT — PAIN DESCRIPTION - DESCRIPTORS: DESCRIPTORS: ACHING;DISCOMFORT

## 2018-07-16 ASSESSMENT — PAIN DESCRIPTION - ORIENTATION: ORIENTATION: MID

## 2018-07-16 ASSESSMENT — PAIN DESCRIPTION - ONSET: ONSET: ON-GOING

## 2018-07-16 ASSESSMENT — PAIN DESCRIPTION - PROGRESSION: CLINICAL_PROGRESSION: NOT CHANGED

## 2018-07-16 ASSESSMENT — PAIN DESCRIPTION - PAIN TYPE: TYPE: ACUTE PAIN

## 2018-07-16 ASSESSMENT — PAIN DESCRIPTION - FREQUENCY: FREQUENCY: INTERMITTENT

## 2018-07-16 NOTE — PROGRESS NOTES
OCCUPATIONAL THERAPY INITIAL EVALUATION        Date:2018  Patient Name: Jj Glez  MRN: [id number]  : 1945  Room: [address] [place], [place] [address]     Evaluating OT: Zhao Talamantes OTR/L #[id number]     Recommended Adaptive Equipment: TBD   AM-PAC Daily Activity Raw Score:   G-Code: CK    Diagnosis: Acute CVA (cerebrovascular accident) ([facility].) [I63.9]     Modified Galo Scale (MRS)  Score     Description  0             No symptoms  1             No significant disability despite symptoms  2             Slight disability; able to look after own affairs  3             Moderate disability; able to ambulate without assist/ requires assist with ADLs  4             Moderate/Severe disability;requires assist to ambulate/assist with ADLs  5             Severe disability;bedridden/incontinent   6               Score:   4    Past Medical History:   Past Medical History:   Diagnosis Date    Arthritis     CAD (coronary artery disease)     COPD (chronic obstructive pulmonary disease) ([facility]     Diabetes ([facility]     HTN (hypertension)     Hyperlipidemia     Osteomyelitis ([facility]     Thyroid disease       Precautions: Fall risk, O2 via nasal cannula     Home Living: Pt lives alone in [facility], [address] [facility], 1 handrail  Bathroom setup: tub, no AE  Equipment owned: O2 (pt on 2L via nasal cannula. Pt wears at night, occasionally as needed during day)    Prior Level of Function: independent with ADLs; independent with IADLs; ambulated independently  Driving: Yes    Pain Level: Pt c/o chronic chest pain. Cognition: oriented x 4  follows 2 step directions.    fair Problem solving skills  Good Memory   Good Sequencing   Fair safety  Additional Comments: Cues for safety during functional mobility - pt often attempted to reach for objects/furniture    Sensory:   Hearing: WFL  Vision: WFL - no complaints    Glasses: yes [x] no [] reading []      UE Assessment:  Hand Dominance: Right [x]  Left []     Strength ROM Additional Info: RUE   4/5 WFL Good  and WFL FMC/dexterity noted during ADL tasks     LUE Deferred L shoulder flexion AROM to 90 degrees  L elbow flexion minimally limited Fair  and poor FMC/dexterity noted during ADL tasks. Poor control of L UE   Sensation: intact, no complaints  Edema:n/a    Functional Assessment:   Initial Eval Status 7/16/18 Comments   Feeding  setup    Grooming  Min A  Increased assist when attempting to complete task w/ LUE. Upper Body Dressing Min A    Lower Body Dressing Min A    Bathing NT    Toileting  SBA  Increased time and effort    Bed Mobility  Supine to Sit: independent  Sit to Supine:independent    Functional Transfers SBA   Education/cues for safety  No AD    Functional Mobility Min A  No AD  Unsteady at times, often attempted to reach for objects/furniture when ambulating to/from bathrm. Education/cues for safety provided. Sit balance: Supervision  Stand balance: Min A for dynamic standing, SBA for static (no AD)  Endurance/Activity tolerance: Fair  4L O2 via nasal cannula. Comments: Upon arrival pt lying in bed. At end of session pt lying in bed with all devices within reach, all lines and tubes intact. Treatment: Therapist facilitated self-care retraining: UB/LB self-care tasks, toileting task and standing grooming task while educating pt on modified techniques d/t L UE deficits, safety and energy conservation techniques. Verbal encouragement and education provided to utilize LUE during self-care tasks as pt verbalized that R hand dominant and has been utilizing RUE for majority of tasks. Skilled monitoring of HR, O2 sats and pts response to treatment.   Pt demonstrating fair understanding of education/techniques, requiring additional training / education      Assessment of current deficits   Functional mobility [x]  ROM [] Strength [x]  Cognition []  ADLs [x]   IADLs [] Safety Awareness [x] Endurance [x]  Fine Motor Coordination [x] Balance

## 2018-07-16 NOTE — PROGRESS NOTES
Mariah Perea is a 67 y.o. woman    Neurology is following for possible stroke    MRIs of brain and c-spine pending    Her L arm feels no different---and remains weaker. No radicular symptoms or swelling. Denies any neck pains    No other new neuro symptoms to report--no speech changes or dysphagia    US of her LLE to be done as she is noting leg pains. Medically, she is stable---with pulmonary following for her COPD and she remains on Rocephin for her UTI    Objective:     /74   Pulse 60   Temp 97.6 °F (36.4 °C) (Oral)   Resp 20   Ht 5' (1.524 m)   Wt 157 lb 12.8 oz (71.6 kg)   SpO2 96%   BMI 30.82 kg/m²     General appearance: alert, appears stated age and cooperative---lying in bed in no acute distress  Head: ecchymoses L maxilla  Eyes: conjunctivae/corneas clear.   Lungs: diminiished throughout---resps nonlabored on NC O2  Heart: RRR  Extremities: no cyanosis or edema; some vascular discoloration to BLE; hammertoes  Pulses: 2+ and symmetric  Skin: ecchymoses L arm; scabbed abrasion L knee    Mental Status: alert, oriented x4, thought content appropriate     Appropriate attention/concentration  Intact fundus of knowledge  Intact memories    Speech: no dysarthria  Language: no aphasias    Cranial Nerves:  I: smell NA   II: visual acuity  NA   II: visual fields Full to confrontation   II: pupils GIDEON   III,VII: ptosis None   III,IV,VI: extraocular muscles  Full ROM   V: mastication Normal   V: facial light touch sensation  Normal   V,VII: corneal reflex     VII: facial muscle function - upper  Normal   VII: facial muscle function - lower Slight L mouth droop that corrects with smiling   VIII: hearing Normal   IX: soft palate elevation  Normal   IX,X: gag reflex    XI: trapezius strength  5/5   XI: sternocleidomastoid strength 5/5   XI: neck extension strength  5/5   XII: tongue strength  Normal     Motor:  Right   5/5              Left   4/5               Right Bicep  5/5           Left Bicep  3/5              Right Triceps   5/5       Left Triceps  5/5          Right Deltoid  5/5     Left Deltoid  3-/5        + L arm drift       3/5 L wrist extensors and flexors  5/5 both legs  No abnormal movements    Sensory:  LT and PP \"feel different\"   L arm    Coordination:   FN and FFM impaired on L relative to weakness    DTR:   Right Brachioradialis reflex 2+  Left Brachioradialis reflex 2+  Right Biceps reflex 2+  Left Biceps reflex 2+  Right Quadriceps reflex 2+  Left Quadriceps reflex NOT DONE---due to abrasions  Right Achilles reflex 1+  Left Achilles reflex 1+    No Babinskis  No Mcwilliams's    No pathological reflexes    Laboratory/Radiology:      Ref. Range 2018 07:14   Cholesterol, Total Latest Ref Range: 0 - 199 mg/dL 141   HDL Cholesterol Latest Ref Range: >40 mg/dL 33   LDL Calculated Latest Ref Range: 0 - 99 mg/dL 76   Triglycerides Latest Ref Range: 0 - 149 mg/dL 162 (H)   VLDL Cholesterol Calculated Latest Units: mg/dL 32      Ref. Range 7/15/2018 08:02   Hemoglobin A1C Latest Ref Range: 4.0 - 5.6 % 5.9 (H)     CT head: remote R frontal lobe stroke; remote R lacunar stroke; remote R temporal lobe stroke; remote R cerebellar stroke; marked remote     CT c-spine: No fracture, traumatic subluxation or other evidence of acute bony injury. No abnormal prevertebral/paraspinous soft tissue swelling. There is cervical spondylosis/degenerative disc disease present with multilevel mild canal indentation/narrowing due to areas of disc bulge/protrusion, spur C3-4, C4-5, C5-6, C6-7 as well as areas of neural foraminal narrowing due to osteophyte formation particularly on the right side at C3-4, C4-5, bilaterally at C5-6, on the left side at C6-7. Slight anterolisthesis C4 on C5, retrolisthesis C5 on C6, most likely on a degenerative basis. US carotids: Atherosclerotic disease.  No hemodynamically significant stenosis is identified Estimated stenosis by NASCET criteria in the proximal right carotid artery is occluded Estimated stenosis by NASCET criteria in the proximal left carotid artery is between 50% to 69%. Elevated velocity in the left vertebral artery suggesting stenosis    MRI brain pending    MRI c-spine pending    All labs and images personally reviewed today    Assessment:     The patient has a history of R MCA strokes due to R carotid occlusion and presented with LUE weakness following a fall down stairs. Her neuro exam reveals continued L arm weakness----MRI of the brain and c-spine pending to rule out ischemic event vs cervical injury---no gross injury on CT c-spine. She is on DAPT with full strength ASA and Plavix---as well as high intensity statin for secondary stroke prevention.     She requires PT/OT    Plan:     Await imaging    Continue meds as stated above    PT/OT    Will follow    AMELIA Lake, CNP  9:36 AM  7/16/2018

## 2018-07-16 NOTE — PROGRESS NOTES
2018    BILITOT 0.4 2018    ALKPHOS 74 2018    AST 17 2018    ALT 14 2018     ABG:    Lab Results   Component Value Date    PH 7.352 07/15/2018    PCO2 49.5 07/15/2018    PO2 71.5 07/15/2018    HCO3 26.8 07/15/2018    BE 0.7 07/15/2018    O2SAT 93.9 07/15/2018        PRO-BNP:   Lab Results   Component Value Date    PROBNP 1,271 (H) 2018      ABGs:   Lab Results   Component Value Date    PH 7.352 07/15/2018    PO2 71.5 07/15/2018    PCO2 49.5 07/15/2018     Hemoglobin A1C: No components found for: HGBA1C    IMAGING:  Imaging tests were completed and reviewed and discussed radiology and primary care team and reveals   Xr Chest Standard (2 Vw)    Result Date: 2018  Patient MRN: [id number] : 1945 Age:  67 years Gender: Female Order Date: 2018 8:30 PM Exam: XR CHEST (2 VW) Number of Images: 1 view Indication:   cp cp Comparison: None. Findings: The heart is unremarkable. The lung fields are unremarkable. The aorta is tortuous and ectatic. Tortuous ectatic aorta     Ct Head Wo Contrast    Result Date: 2018  CT HEAD WO CONTRAST INDICATION:  left side weakness  COMPARISON: 2016 MRI TECHNIQUE: Routine axial images through the head without contrast. Coronal and sagittal reformations. Low-dose CT  acquisition technique included one of following options: 1 . Automated exposure control 2. Adjustment of MA and or KV according to patient's size or 3. Use of iterative reconstruction. FINDINGS: There are periventricular and deep white matter hypodensities, likely related to chronic microvascular ischemic changes. Right basal ganglia old lacunar infarct noted. Ventricular and cisternal spaces are normal in size, shape and configuration for a patient of this age. No dominant mass, midline shift, hydrocephalus, extra-axial fluid collection or acute hemorrhage. No acute sinus disease. Skull is intact.  Intraorbital contents are unremarkable     No acute intracranial hemorrhage. Ct Cervical Spine Wo Contrast    Result Date: 7/15/2018  EXAM:   CT Cervical Spine Without Intravenous Contrast CLINICAL HISTORY:   67years old, female; Signs and symptoms; Weakness; Additional info: Trauma, left arm weakness. TECHNIQUE:   Thin section images through the cervical spine in transaxial plane with sagittal and coronal reformations. Coronal and sagittal reformatted images were created and reviewed. All CT scans at this facility use at least one of these dose optimization techniques: automated exposure control; mA and/or kV adjustment per patient size (includes targeted exams where dose is matched to clinical indication); or iterative reconstruction. COMPARISON:   No relevant prior studies available. FINDINGS:   No fracture, traumatic subluxation or other evidence of acute bony injury. No abnormal prevertebral/paraspinous soft tissue swelling. There is cervical spondylosis/degenerative disc disease present with multilevel mild canal indentation/narrowing due to areas of disc bulge/protrusion, spur C3-4, C4-5, C5-6, C6-7 as well as areas of neural foraminal narrowing due to osteophyte formation particularly on the right side at C3-4, C4-5, bilaterally at C5-6, on the left side at C6-7. Slight anterolisthesis C4 on C5, retrolisthesis C5 on C6, most likely on a degenerative basis. No CT evidence of acute C-spine injury. Cervical spondylosis/degenerative disc disease as detailed above. Areas of minimal spondylolisthesis most likely degenerative as discussed above. For persistent or progressive symptoms, could further investigate with MRI.  This report has been electronically signed by Alicia Elizabeth MD.    Us Retroperitoneal Complete    Result Date: 7/15/2018  Patient MRN:  [id number] : 1945 Age: 67 years Gender: Female Order Date:  7/15/2018 12:45 AM EXAM: US RETROPERITONEAL COMPLETE NUMBER OF IMAGES:  62 views INDICATION:  RENAL FAILURE, ACUTE (KIDNEY INJURY) COMPARISON: CT scan 2017 The right kidney measures 8 x 2.2 x 4.3 cm The left kidney  measures 11.5 x 5.3 x 5.6 and There is a hypoechoic nodule superior to the left kidney when compared with the previous CT scan I suspect represents an accessory spleen There is no hydronephrosis identified There is no calculus identified The bladder is unremarkable     Right renal atrophy     Us Carotid Artery Bilateral    Result Date: 7/15/2018  Patient MRN:  [id number] : 1945 Age: 67 years Gender: Female Order Date:  7/15/2018 7:00 AM EXAM: US CAROTID ARTERY BILATERAL NUMBER OF IMAGES:  70 INDICATION: Stroke COMPARISON: None FINDINGS: Velocities are left are mildly elevated ICA\CCA ratios are on the left are mildly elevated  The right vertebral artery doppler images demonstrate  antegrade flow. The left vertebral artery doppler images demonstrate elevated velocity in the left vertebral artery Grey scale images demonstrate moderate plaque identified in the right and left carotid arteries. The right proximal internal carotid artery is noted to be occluded     Atherosclerotic disease. No hemodynamically significant stenosis is identified Estimated stenosis by NASCET criteria in the proximal right carotid artery is occluded Estimated stenosis by NASCET criteria in the proximal left carotid artery is between 50% to 69%. Elevated velocity in the left vertebral artery suggesting stenosis, correlation with MR angiogram or CT angiogram may be useful       Assessment  1. COPD with AECOPD  2. Smoker  3. Hx of COPD  4. CVA    Plan  1. Wean IV Steroids  2. Continue with bronchodialtors  3. Frequent ABGs repeat with chronic hypercapnia (ph Normal)  4. AVAPS QHS  5. Stop smoking  6. On rocephin continue for 5 days  7.  No narcotics or benzo        Theoplis Arms , MPH, FCCP, FACOI, [address]

## 2018-07-16 NOTE — PROGRESS NOTES
Date: 7/16/2018    Time: 1:10 AM    Patient Placed On BIPAP/CPAP/ Non-Invasive Ventilation? No    If no must comment. Facial area red/color change? No           If YES are Blister/Lesion present? No   If yes must notify nursing staff  BIPAP/CPAP skin barrier? No    Skin barrier type:    Comments: Patient at bedside eating and drinking. Offered bipap, patient refused, stating she tolerated it for short time earlier. Explained benefits and instructed patient to call when ready.         Ricardo Gordillo, RRT

## 2018-07-16 NOTE — PROGRESS NOTES
Hospitalist Progress Note      PCP: Kanchan Hernandez DO    Date of Admission: 7/14/2018  Days in the hospital: 1    Chief Complaint: Left upper extremity weakness, acute respiratory failure with hypercapnia    Hospital Course:     Neurology consulted. Placed on rocephin for UTI. VQ scan ordered for shortness of breath and elevated D-dimer. Subjective  Patient seen and examined at bedside. More awake and alert today. Patient denies any fevers, chills, chest pain, nausea, vomiting.       Medications:  Reviewed    Infusion Medications    sodium chloride 75 mL/hr at 07/15/18 0355     Scheduled Medications    insulin lispro  0-10 Units Subcutaneous 4x Daily AC & HS    nicotine  1 patch Transdermal Daily    ipratropium-albuterol  1 ampule Nebulization 4x daily    budesonide  0.5 mg Nebulization BID    aspirin  325 mg Oral Daily    atorvastatin  40 mg Oral Nightly    calcium-vitamin D  1 tablet Oral BID    citalopram  20 mg Oral Nightly    clonazePAM  0.5 mg Oral BID    clopidogrel  75 mg Oral Daily    diltiazem  120 mg Oral Daily    fenofibrate  160 mg Oral Daily    gabapentin  100 mg Oral TID    levothyroxine  137 mcg Oral Daily    lisinopril  10 mg Oral Daily    cetirizine  5 mg Oral Daily    metoprolol tartrate  25 mg Oral Daily    pantoprazole  40 mg Oral Daily    linagliptin  5 mg Oral Daily    sodium chloride flush  10 mL Intravenous 2 times per day    cefTRIAXone (ROCEPHIN) IV  1 g Intravenous Q24H     PRN Meds: LORazepam, albuterol sulfate HFA, oxyCODONE, zolpidem, sodium chloride flush, magnesium hydroxide, ondansetron, ipratropium-albuterol      Intake/Output Summary (Last 24 hours) at 07/16/18 1149  Last data filed at 07/16/18 0517   Gross per 24 hour   Intake          1986.25 ml   Output                0 ml   Net          1986.25 ml       Exam:    /74   Pulse 60   Temp 97.6 °F (36.4 °C) (Oral)   Resp 20   Ht 5' (1.524 m)   Wt 157 lb 12.8 oz (71.6 kg)   SpO2 96% BMI 30.82 kg/m²     General appearance: No apparent distress, appears stated age and cooperative. HEENT: Conjunctivae/corneas clear. Pupils equal and round. No injections noted. Neck: Supple. No lesions, scars or masses. Trachea midline. Respiratory:  Normal respiratory effort. Diminished air movement with few scattered Wheezes/Rhonchi. Cardiovascular: Regular rate and rhythm with normal S1/S2 without murmurs, rubs or gallops. Abdomen: Soft, non-tender, non-distended with normal bowel sounds. Musculoskeletal: No clubbing, cyanosis or edema bilaterally. Brisk capillary refill. 2+ lower extremity pulses (dorsalis pedis). Skin:  No rashes. Bruises of face and left forearm. Neurologic: awake, alert and following commands       Labs:   Recent Labs      07/14/18 2215 07/16/18   1049   WBC  5.1  5.3   HGB  13.6  12.2   HCT  43.0  38.2   PLT  172  166     Recent Labs      07/14/18   2215  07/15/18   0802  07/15/18   1042  07/16/18   0714 07/16/18   1049   NA  144  141   --   133   --    K  4.4  5.3*  4.6  5.7*  4.9   CL  104  103   --   102   --    CO2  30*  26   --   18*   --    BUN  36*  34*   --   38*   --    CREATININE  1.8*  1.6*   --   1.5*   --    CALCIUM  9.4  8.8   --   9.1   --      Recent Labs      07/14/18 2215   AST  17   ALT  14   BILITOT  0.4   ALKPHOS  74     Recent Labs      07/14/18 2215   INR  1.0     Recent Labs      07/14/18   2215  07/15/18   0802  07/15/18   1148   CKTOTAL   --   92   --    TROPONINI  <0.01   --   <0.01       Imaging:  US DUP LOWER EXTREMITIES BILATERAL VENOUS   Final Result   Patent deep venous system of both lower extremities. No   evidence for DVT. NM LUNG VENT/PERFUSION (VQ)   Final Result   This study is considered to be of moderate to low   probability for pulmonary emboli as commented. US RETROPERITONEAL COMPLETE   Final Result   Right renal atrophy         US CAROTID ARTERY BILATERAL   Final Result   Atherosclerotic disease.  No hemodynamically

## 2018-07-16 NOTE — PROGRESS NOTES
forward flexed posture. Patient with mild unsteadiness with gait, and demonstrated mild toe out gait with flat foot placement. Patient not willing to ambulate outside the room to assess stairs. Patient assisted back to supine per request with call light and tray table in reach. Patient would benefit from family to assist at discharge and possible use of AD for balance. Outpatient rehab for LUE deficits. Patient education  Pt educated on safety with mobility, transfers, gait, use of LUE. Patient response to education:   Pt verbalized understanding Pt demonstrated skill Pt requires further education in this area   Yes  Yes with verbal cues and assist Yes      Rehab potential is Good for reaching above PT goals. Pts/ family goals   1. To go home. Patient and or family understand(s) diagnosis, prognosis, and plan of care. Yes     PLAN  PT care will be provided in accordance with the objectives noted above. Whenever appropriate, clear delegation orders will be provided for nursing staff. Exercises and functional mobility practice will be used as well as appropriate assistive devices or modalities to obtain goals. Patient and family education will also be administered as needed. Frequency of treatments will be 2-5x/week x 1-3 days.     Time in: 0851  Time out: 3300 [facility] 1788, PT, DPT   License number:  [id number]

## 2018-07-16 NOTE — CARE COORDINATION
Met with patient at bedside to discuss admission diagnosis and treatment plans. Reviewed IV steroids, rocephin, and breathing treatments with patient. Reviewed pulmonology input and smoking cessation. Patient has no interest in quitting at this time. Explained that we are waiting for cardiology to read her echocardiogram that she had completed and that the patient still needs to have MRI's completed. Patient expressed understanding. Patient currently on 5L O2 per NC and only wears 2L at home. Wean O2 as tolerated. Reviewed PT evaluation and explained the patient may be appropriate for a bit of therapy at home. Patient agreeable and Tommy 78 list provided to patient. Patient states she needs a Walker for home use. Will request DME order from internal med. Will continue to follow for further treatment plans and transition of care planning needs.      Jarrett Clarke.

## 2018-07-16 NOTE — CONSULTS
Vascular Surgery Consultation Note    Reason for Consult:  R Carotid artery occlusion    HPI : This is a 67 y.o. female admitted on 7/14/2018  9:11 PM with acute left upper extremity weakness. She started to have sxs 7/12/18. She has no pain associated with these issues. She also has a hx of recent fell at home while climbing stairs. She fell and hit her face sustaining fascial injuries. SHe was noted on MRI to have Right MCA distribution stroke. She was on asa 325 mg, plavix 75 mg, and lipitor 40 mg at home prior to coming to hospital.      Vascular surgery is consulted in regards to R Carotid artery occlusion. She was noted on US to have R ICA occlusion. This is a chronic occlusion which was first noted in 8/2016. I had seen her at that time also. She presented with confusion and L UE weakness. Prior to that she had no previous hx of stroke or tia. She also has a known hx of right vertebral artery occlusion. She denies residual L UE deficits from previous stroke. Denies sxs of llifestyle limiting claudication, rest pain, or tissue loss.       ROS : All others Negative if blank [], Positive if [x]  General Urinary   [] Fevers [] Hematuria   [] Chills [] Dysuria   [] Weight Loss Vascular   Skin [] Claudication   [] Tissue Loss [] Rest Pain   Eyes Neurologic   [x] Wears Glasses/Contacts [x] Stroke/TIA   [] Vision Changes [x] Focal weakness   Respiratory [] Slurred Speech    [x] Shortness of breath ENT   Cardiovascular [] Difficulty swallowing   [] Chest Pain Endocrine    [x] Shortness of breath with exertion [] Increased Thirst   Gastrointestinal    [] Abdominal Pain    [] Melena   [] Hematochezia         Past Medical History:   Diagnosis Date    Arthritis     CAD (coronary artery disease)     COPD (chronic obstructive pulmonary disease) (HCC)     Diabetes [facility] [address]     HTN (hypertension)     Hyperlipidemia     Osteomyelitis ([facility] [address]     Thyroid disease         Past Surgical History:   Procedure Laterality Date    CARDIAC SURGERY      CHOLECYSTECTOMY      COLONOSCOPY      CORONARY ARTERY BYPASS GRAFT N/A May 22, 2013    ENDOSCOPY, COLON, DIAGNOSTIC      HYSTERECTOMY      JOINT REPLACEMENT      bilateral knees     Current Medications:    sodium chloride 75 mL/hr at 07/15/18 0355      albuterol sulfate HFA, oxyCODONE, zolpidem, sodium chloride flush, magnesium hydroxide, ondansetron, ipratropium-albuterol    insulin lispro  0-10 Units Subcutaneous 4x Daily AC & HS    nicotine  1 patch Transdermal Daily    ipratropium-albuterol  1 ampule Nebulization 4x daily    budesonide  0.5 mg Nebulization BID    aspirin  325 mg Oral Daily    atorvastatin  40 mg Oral Nightly    calcium-vitamin D  1 tablet Oral BID    citalopram  20 mg Oral Nightly    clonazePAM  0.5 mg Oral BID    clopidogrel  75 mg Oral Daily    diltiazem  120 mg Oral Daily    fenofibrate  160 mg Oral Daily    gabapentin  100 mg Oral TID    levothyroxine  137 mcg Oral Daily    lisinopril  10 mg Oral Daily    cetirizine  5 mg Oral Daily    metoprolol tartrate  25 mg Oral Daily    pantoprazole  40 mg Oral Daily    linagliptin  5 mg Oral Daily    sodium chloride flush  10 mL Intravenous 2 times per day    cefTRIAXone (ROCEPHIN) IV  1 g Intravenous Q24H      Allergies:  Patient has no known allergies. Social History     Social History    Marital status:      Spouse name: N/A    Number of children: N/A    Years of education: N/A     Occupational History    Not on file.      Social History Main Topics    Smoking status: Current Every Day Smoker     Packs/day: 1.50     Years: 55.00    Smokeless tobacco: Former User    Alcohol use No    Drug use: No    Sexual activity: No     Other Topics Concern    Not on file     Social History Narrative    No narrative on file     Family History   Problem Relation Age of Onset    Heart Attack Mother     Hypertension Mother     Heart Attack Father    Naomi Duncan Cancer Father     COPD Father     Cancer Sister     Other Brother     COPD Sister      PHYSICAL EXAM:    BP (!) 162/88 Comment: manual  Pulse 79   Temp 98.7 °F (37.1 °C) (Oral)   Resp 18   Ht 5' (1.524 m)   Wt 157 lb 12.8 oz (71.6 kg)   SpO2 93%   BMI 30.82 kg/m²   CONSTITUTIONAL:   Awake, alert, cooperative  PSYCHIATRIC :  Oriented to time, place and person      Appropriate insight to disease process  EYES: Lids and lashes normal  ENT:  External ears and nose without lesions   Hearing deficits not noted  CN II - XII intact   NECK: Supple, symmetrical, trachea midline   Thyroid goiter not appreciated  LUNGS:  No increased work of breathing                 Good respiratory excursion  CARDIOVASCULAR:  regular rate and rhythm   ABDOMEN:  soft, non-distended, non-tender   Hernias not noted   Aorta is not palpable  Lymphatics : Cervical lymphadenopathy not noted     Femoral lymphadenopathy no tnoted  SKIN:   Normal skin color   Texture and turgor normal, no induration  EXTREMITIES:   R UE 5/5 strength   No cyanosis noted in nail beds  L UE 4/5 strength   No cyanosis noted in nail beds  R LE Edema absent   Open wounds absent   L LE Edema absent   Open wound absent  R posterior tibial monophasic L posterior tibial monophasic   R dorsalis pedis monophasic L dorsalis pedis monophasic     LABS:    Lab Results   Component Value Date    WBC 5.3 07/16/2018    HGB 12.2 07/16/2018    HCT 38.2 07/16/2018     07/16/2018    PROTIME 11.6 07/14/2018    INR 1.0 07/14/2018    K 4.9 07/16/2018    BUN 38 (H) 07/16/2018    CREATININE 1.5 (H) 07/16/2018     Radiology pertinent to vascular surgery evaluation reviewed    Assesment/Plan  Carotid Duplex   R ICA occluded    L ICA 50-59 % stenosis   R Vertebral antegrade flow (previously on CT noted to be occluded)   L Vertebral antegrade flow    A/P Asymptomatic Left Carotid Stenosis  · Medical management with ASA, plavix and statin  · Discussed with pt tobacco use and

## 2018-07-17 ENCOUNTER — TELEPHONE (OUTPATIENT)
Dept: VASCULAR SURGERY | Age: 73
End: 2018-07-17

## 2018-07-17 PROBLEM — I63.511 ACUTE ISCHEMIC RIGHT MCA STROKE (HCC): Status: ACTIVE | Noted: 2018-07-15

## 2018-07-17 LAB
ANION GAP SERPL CALCULATED.3IONS-SCNC: 10 MMOL/L (ref 7–16)
BASOPHILS ABSOLUTE: 0 E9/L (ref 0–0.2)
BASOPHILS RELATIVE PERCENT: 0.2 % (ref 0–2)
BUN BLDV-MCNC: 44 MG/DL (ref 8–23)
CALCIUM SERPL-MCNC: 9.5 MG/DL (ref 8.6–10.2)
CHLORIDE BLD-SCNC: 99 MMOL/L (ref 98–107)
CO2: 28 MMOL/L (ref 22–29)
CREAT SERPL-MCNC: 1.4 MG/DL (ref 0.5–1)
EOSINOPHILS ABSOLUTE: 0 E9/L (ref 0.05–0.5)
EOSINOPHILS RELATIVE PERCENT: 0 % (ref 0–6)
GFR AFRICAN AMERICAN: 45
GFR NON-AFRICAN AMERICAN: 37 ML/MIN/1.73
GLUCOSE BLD-MCNC: 221 MG/DL (ref 74–109)
HCT VFR BLD CALC: 37.6 % (ref 34–48)
HEMOGLOBIN: 11.8 G/DL (ref 11.5–15.5)
LYMPHOCYTES ABSOLUTE: 0.5 E9/L (ref 1.5–4)
LYMPHOCYTES RELATIVE PERCENT: 7.8 % (ref 20–42)
MCH RBC QN AUTO: 29.6 PG (ref 26–35)
MCHC RBC AUTO-ENTMCNC: 31.4 % (ref 32–34.5)
MCV RBC AUTO: 94.2 FL (ref 80–99.9)
METAMYELOCYTES RELATIVE PERCENT: 0.9 % (ref 0–1)
METER GLUCOSE: 161 MG/DL (ref 70–110)
METER GLUCOSE: 185 MG/DL (ref 70–110)
METER GLUCOSE: 196 MG/DL (ref 70–110)
METER GLUCOSE: 252 MG/DL (ref 70–110)
MONOCYTES ABSOLUTE: 0.19 E9/L (ref 0.1–0.95)
MONOCYTES RELATIVE PERCENT: 2.6 % (ref 2–12)
NEUTROPHILS ABSOLUTE: 5.58 E9/L (ref 1.8–7.3)
NEUTROPHILS RELATIVE PERCENT: 88.7 % (ref 43–80)
OVALOCYTES: ABNORMAL
PDW BLD-RTO: 13.9 FL (ref 11.5–15)
PLATELET # BLD: 150 E9/L (ref 130–450)
PMV BLD AUTO: 10.2 FL (ref 7–12)
POIKILOCYTES: ABNORMAL
POLYCHROMASIA: ABNORMAL
POTASSIUM SERPL-SCNC: 4.9 MMOL/L (ref 3.5–5)
RBC # BLD: 3.99 E12/L (ref 3.5–5.5)
SODIUM BLD-SCNC: 137 MMOL/L (ref 132–146)
WBC # BLD: 6.2 E9/L (ref 4.5–11.5)

## 2018-07-17 PROCEDURE — 99233 SBSQ HOSP IP/OBS HIGH 50: CPT | Performed by: INTERNAL MEDICINE

## 2018-07-17 PROCEDURE — 87077 CULTURE AEROBIC IDENTIFY: CPT

## 2018-07-17 PROCEDURE — 6370000000 HC RX 637 (ALT 250 FOR IP): Performed by: INTERNAL MEDICINE

## 2018-07-17 PROCEDURE — 87088 URINE BACTERIA CULTURE: CPT

## 2018-07-17 PROCEDURE — 85025 COMPLETE CBC W/AUTO DIFF WBC: CPT

## 2018-07-17 PROCEDURE — 82962 GLUCOSE BLOOD TEST: CPT

## 2018-07-17 PROCEDURE — 87186 SC STD MICRODIL/AGAR DIL: CPT

## 2018-07-17 PROCEDURE — 2060000000 HC ICU INTERMEDIATE R&B

## 2018-07-17 PROCEDURE — 99232 SBSQ HOSP IP/OBS MODERATE 35: CPT | Performed by: NURSE PRACTITIONER

## 2018-07-17 PROCEDURE — 6370000000 HC RX 637 (ALT 250 FOR IP): Performed by: FAMILY MEDICINE

## 2018-07-17 PROCEDURE — 36415 COLL VENOUS BLD VENIPUNCTURE: CPT

## 2018-07-17 PROCEDURE — 80048 BASIC METABOLIC PNL TOTAL CA: CPT

## 2018-07-17 PROCEDURE — 6370000000 HC RX 637 (ALT 250 FOR IP): Performed by: NURSE PRACTITIONER

## 2018-07-17 PROCEDURE — 94640 AIRWAY INHALATION TREATMENT: CPT

## 2018-07-17 PROCEDURE — 2700000000 HC OXYGEN THERAPY PER DAY

## 2018-07-17 PROCEDURE — 94660 CPAP INITIATION&MGMT: CPT

## 2018-07-17 PROCEDURE — 2580000003 HC RX 258: Performed by: FAMILY MEDICINE

## 2018-07-17 RX ORDER — ATORVASTATIN CALCIUM 40 MG/1
80 TABLET, FILM COATED ORAL NIGHTLY
Status: DISCONTINUED | OUTPATIENT
Start: 2018-07-17 | End: 2018-07-20 | Stop reason: HOSPADM

## 2018-07-17 RX ORDER — PREDNISONE 20 MG/1
40 TABLET ORAL DAILY
Status: DISCONTINUED | OUTPATIENT
Start: 2018-07-17 | End: 2018-07-17

## 2018-07-17 RX ORDER — PREDNISONE 10 MG/1
10 TABLET ORAL DAILY
Status: DISCONTINUED | OUTPATIENT
Start: 2018-07-26 | End: 2018-07-17

## 2018-07-17 RX ORDER — PREDNISONE 20 MG/1
40 TABLET ORAL DAILY
Status: DISCONTINUED | OUTPATIENT
Start: 2018-07-17 | End: 2018-07-17 | Stop reason: SDUPTHER

## 2018-07-17 RX ORDER — PREDNISONE 20 MG/1
20 TABLET ORAL DAILY
Status: DISCONTINUED | OUTPATIENT
Start: 2018-07-23 | End: 2018-07-17

## 2018-07-17 RX ADMIN — CALCIUM CARBONATE-VITAMIN D TAB 500 MG-200 UNIT 1 TABLET: 500-200 TAB at 20:03

## 2018-07-17 RX ADMIN — IPRATROPIUM BROMIDE AND ALBUTEROL SULFATE 1 AMPULE: 2.5; .5 SOLUTION RESPIRATORY (INHALATION) at 11:33

## 2018-07-17 RX ADMIN — CETIRIZINE HYDROCHLORIDE 5 MG: 10 TABLET, FILM COATED ORAL at 08:43

## 2018-07-17 RX ADMIN — PREDNISONE 40 MG: 20 TABLET ORAL at 14:19

## 2018-07-17 RX ADMIN — IPRATROPIUM BROMIDE AND ALBUTEROL SULFATE 1 AMPULE: 2.5; .5 SOLUTION RESPIRATORY (INHALATION) at 21:36

## 2018-07-17 RX ADMIN — LEVOTHYROXINE SODIUM 137 MCG: 112 TABLET ORAL at 06:24

## 2018-07-17 RX ADMIN — IPRATROPIUM BROMIDE AND ALBUTEROL SULFATE 1 AMPULE: 2.5; .5 SOLUTION RESPIRATORY (INHALATION) at 08:13

## 2018-07-17 RX ADMIN — CITALOPRAM 20 MG: 20 TABLET, FILM COATED ORAL at 20:03

## 2018-07-17 RX ADMIN — LINAGLIPTIN 5 MG: 5 TABLET, FILM COATED ORAL at 08:44

## 2018-07-17 RX ADMIN — INSULIN LISPRO 2 UNITS: 100 INJECTION, SOLUTION INTRAVENOUS; SUBCUTANEOUS at 20:07

## 2018-07-17 RX ADMIN — FENOFIBRATE 160 MG: 160 TABLET ORAL at 08:44

## 2018-07-17 RX ADMIN — GABAPENTIN 100 MG: 100 CAPSULE ORAL at 20:03

## 2018-07-17 RX ADMIN — Medication 10 ML: at 20:04

## 2018-07-17 RX ADMIN — CALCIUM CARBONATE-VITAMIN D TAB 500 MG-200 UNIT 1 TABLET: 500-200 TAB at 08:44

## 2018-07-17 RX ADMIN — GABAPENTIN 100 MG: 100 CAPSULE ORAL at 13:44

## 2018-07-17 RX ADMIN — BUDESONIDE 500 MCG: 0.5 SUSPENSION RESPIRATORY (INHALATION) at 21:36

## 2018-07-17 RX ADMIN — PANTOPRAZOLE SODIUM 40 MG: 40 TABLET, DELAYED RELEASE ORAL at 08:43

## 2018-07-17 RX ADMIN — CLOPIDOGREL 75 MG: 75 TABLET, FILM COATED ORAL at 08:44

## 2018-07-17 RX ADMIN — CLONAZEPAM 0.5 MG: 0.5 TABLET ORAL at 08:48

## 2018-07-17 RX ADMIN — INSULIN LISPRO 6 UNITS: 100 INJECTION, SOLUTION INTRAVENOUS; SUBCUTANEOUS at 06:24

## 2018-07-17 RX ADMIN — ASPIRIN 325 MG: 325 TABLET ORAL at 08:44

## 2018-07-17 RX ADMIN — ATORVASTATIN CALCIUM 80 MG: 40 TABLET, FILM COATED ORAL at 20:03

## 2018-07-17 RX ADMIN — METOPROLOL TARTRATE 25 MG: 25 TABLET ORAL at 08:44

## 2018-07-17 RX ADMIN — INSULIN LISPRO 2 UNITS: 100 INJECTION, SOLUTION INTRAVENOUS; SUBCUTANEOUS at 11:43

## 2018-07-17 RX ADMIN — GABAPENTIN 100 MG: 100 CAPSULE ORAL at 08:44

## 2018-07-17 RX ADMIN — DILTIAZEM HYDROCHLORIDE 120 MG: 120 CAPSULE, COATED, EXTENDED RELEASE ORAL at 08:44

## 2018-07-17 RX ADMIN — BUDESONIDE 500 MCG: 0.5 SUSPENSION RESPIRATORY (INHALATION) at 08:13

## 2018-07-17 RX ADMIN — CLONAZEPAM 0.5 MG: 0.5 TABLET ORAL at 20:03

## 2018-07-17 RX ADMIN — OXYCODONE HYDROCHLORIDE 15 MG: 5 TABLET ORAL at 20:04

## 2018-07-17 RX ADMIN — OXYCODONE HYDROCHLORIDE 15 MG: 5 TABLET ORAL at 04:52

## 2018-07-17 RX ADMIN — LISINOPRIL 10 MG: 10 TABLET ORAL at 08:44

## 2018-07-17 ASSESSMENT — PAIN DESCRIPTION - LOCATION
LOCATION: CHEST
LOCATION: CHEST;BACK

## 2018-07-17 ASSESSMENT — PAIN DESCRIPTION - ORIENTATION: ORIENTATION: MID

## 2018-07-17 ASSESSMENT — PAIN DESCRIPTION - DESCRIPTORS
DESCRIPTORS: ACHING;DISCOMFORT
DESCRIPTORS: DISCOMFORT;ACHING

## 2018-07-17 ASSESSMENT — PAIN SCALES - GENERAL
PAINLEVEL_OUTOF10: 0
PAINLEVEL_OUTOF10: 8
PAINLEVEL_OUTOF10: 6
PAINLEVEL_OUTOF10: 8
PAINLEVEL_OUTOF10: 0
PAINLEVEL_OUTOF10: 0

## 2018-07-17 ASSESSMENT — PAIN DESCRIPTION - PAIN TYPE
TYPE: CHRONIC PAIN
TYPE: CHRONIC PAIN

## 2018-07-17 ASSESSMENT — PAIN DESCRIPTION - ONSET: ONSET: PROGRESSIVE

## 2018-07-17 ASSESSMENT — PAIN DESCRIPTION - FREQUENCY: FREQUENCY: INTERMITTENT

## 2018-07-17 ASSESSMENT — PAIN DESCRIPTION - PROGRESSION: CLINICAL_PROGRESSION: NOT CHANGED

## 2018-07-17 NOTE — PROGRESS NOTES
[facility]  Division of Pulmonary, Critical Care and Sleep Medicine  Consult Note      Admit Date:  7/14/2018    MRN:   [id number]        Patient:        PCP:   Ruth Schmitz DO    CONSULT : Acute respiratory Failure      SUBJECTIVE:    Work up for PE negative  Good old fashion COPD exacerbation  Discussed her smoking and need to quit  MRI proved acute embolic R MCA strokes        OBJECTIVE:     PHYSICAL EXAM:   VITALS:   Patient Vitals for the past 8 hrs:   BP Temp Temp src Pulse Resp SpO2   07/17/18 0745 (!) 151/75 96.7 °F (35.9 °C) Axillary 60 16 98 %   07/17/18 0452 - - - - - 96 %       Intake/Output Summary (Last 24 hours) at 07/17/18 1209  Last data filed at 07/17/18 0457   Gross per 24 hour   Intake              780 ml   Output                0 ml   Net              780 ml        CONSTITUTIONAL:   A&O x 3,   SKIN:    No rash, No suspicious lesions  HEENT:    EOMI, MMM, No thrush  NECK:   No bruits, No JVP apprechiated  CV:     Sinus,  No Murmus, No Rubs, No gallop  PULMONARY:  Couse,  No Wheezing, No Rales, No Rhonchi or Egophony  ABDOMEN:    Soft, non-tender. BS normal. No R/R/G  EXT:   Chornic skin discoloration.   Extremity edema, No venous stasis  PULSE:  Peripheral pulses barely palpable   PSYCHIATRIC: Approprate  MS:   Weakness  NEUROLOGIC:  Non focal exam.     DATA: IMAGING & TESTING:     LABORATORY TESTS:    CBC:   Lab Results   Component Value Date    WBC 6.2 07/17/2018    RBC 3.99 07/17/2018    HGB 11.8 07/17/2018    HCT 37.6 07/17/2018    MCV 94.2 07/17/2018    MCH 29.6 07/17/2018    MCHC 31.4 07/17/2018    RDW 13.9 07/17/2018     07/17/2018    MPV 10.2 07/17/2018     CMP:    Lab Results   Component Value Date     07/17/2018    K 4.9 07/17/2018    CL 99 07/17/2018    CO2 28 07/17/2018    BUN 44 07/17/2018    CREATININE 1.4 07/17/2018    GFRAA 45 07/17/2018    LABGLOM 37 07/17/2018    GLUCOSE 221 07/17/2018    GLUCOSE 123 05/11/2012    PROT 6.6 2018    LABALBU 3.5 2018    LABALBU 4.3 2012    CALCIUM 9.5 2018    BILITOT 0.4 2018    ALKPHOS 74 2018    AST 17 2018    ALT 14 2018     ABG:    Lab Results   Component Value Date    PH 7.352 07/15/2018    PCO2 49.5 07/15/2018    PO2 71.5 07/15/2018    HCO3 26.8 07/15/2018    BE 0.7 07/15/2018    O2SAT 93.9 07/15/2018        PRO-BNP:   Lab Results   Component Value Date    PROBNP 1,271 (H) 2018      ABGs:   Lab Results   Component Value Date    PH 7.352 07/15/2018    PO2 71.5 07/15/2018    PCO2 49.5 07/15/2018     Hemoglobin A1C: No components found for: HGBA1C    IMAGING:  Imaging tests were completed and reviewed and discussed radiology and primary care team and reveals   Xr Chest Standard (2 Vw)    Result Date: 2018  Patient MRN: [id number] : 1945 Age:  67 years Gender: Female Order Date: 2018 8:30 PM Exam: XR CHEST (2 VW) Number of Images: 1 view Indication:   cp cp Comparison: None. Findings: The heart is unremarkable. The lung fields are unremarkable. The aorta is tortuous and ectatic. Tortuous ectatic aorta     Ct Head Wo Contrast    Result Date: 2018  CT HEAD WO CONTRAST INDICATION:  left side weakness  COMPARISON: 2016 MRI TECHNIQUE: Routine axial images through the head without contrast. Coronal and sagittal reformations. Low-dose CT  acquisition technique included one of following options: 1 . Automated exposure control 2. Adjustment of MA and or KV according to patient's size or 3. Use of iterative reconstruction. FINDINGS: There are periventricular and deep white matter hypodensities, likely related to chronic microvascular ischemic changes. Right basal ganglia old lacunar infarct noted. Ventricular and cisternal spaces are normal in size, shape and configuration for a patient of this age. No dominant mass, midline shift, hydrocephalus, extra-axial fluid collection or acute hemorrhage. No acute sinus disease. IMAGES:  62 views INDICATION:  RENAL FAILURE, ACUTE (KIDNEY INJURY) COMPARISON: CT scan 2017 The right kidney measures 8 x 2.2 x 4.3 cm The left kidney  measures 11.5 x 5.3 x 5.6 and There is a hypoechoic nodule superior to the left kidney when compared with the previous CT scan I suspect represents an accessory spleen There is no hydronephrosis identified There is no calculus identified The bladder is unremarkable     Right renal atrophy     Us Carotid Artery Bilateral    Result Date: 7/15/2018  Patient MRN:  [id number] : 1945 Age: 67 years Gender: Female Order Date:  7/15/2018 7:00 AM EXAM: US CAROTID ARTERY BILATERAL NUMBER OF IMAGES:  70 INDICATION: Stroke COMPARISON: None FINDINGS: Velocities are left are mildly elevated ICA\CCA ratios are on the left are mildly elevated  The right vertebral artery doppler images demonstrate  antegrade flow. The left vertebral artery doppler images demonstrate elevated velocity in the left vertebral artery Grey scale images demonstrate moderate plaque identified in the right and left carotid arteries. The right proximal internal carotid artery is noted to be occluded     Atherosclerotic disease. No hemodynamically significant stenosis is identified Estimated stenosis by NASCET criteria in the proximal right carotid artery is occluded Estimated stenosis by NASCET criteria in the proximal left carotid artery is between 50% to 69%. Elevated velocity in the left vertebral artery suggesting stenosis, correlation with MR angiogram or CT angiogram may be useful       Assessment  1. COPD with AECOPD  2. Smoker  3. Hx of COPD  4. CVA new     Plan  1. Stop Steroids  2. Continue with bronchodialtors  3. AVAPS QHS  4. Stop smoking  5. On rocephin continue for 5 days  6. No narcotics or benzo  7. Rehabilitation needed?         [place] DO, MPH, FCCP, FACOI, FACP,

## 2018-07-17 NOTE — PROGRESS NOTES
pantoprazole  40 mg Oral Daily    linagliptin  5 mg Oral Daily    sodium chloride flush  10 mL Intravenous 2 times per day    cefTRIAXone (ROCEPHIN) IV  1 g Intravenous Q24H     PRN Meds: albuterol sulfate HFA, oxyCODONE, zolpidem, sodium chloride flush, magnesium hydroxide, ondansetron, ipratropium-albuterol      Intake/Output Summary (Last 24 hours) at 07/17/18 1822  Last data filed at 07/17/18 1434   Gross per 24 hour   Intake             1140 ml   Output                0 ml   Net             1140 ml       Exam:    BP (!) 152/76   Pulse 60   Temp 97.8 °F (36.6 °C) (Oral)   Resp 18   Ht 5' (1.524 m)   Wt 177 lb 1.6 oz (80.3 kg)   SpO2 94%   BMI 34.59 kg/m²     General appearance: No apparent distress, appears stated age and cooperative. HEENT: Conjunctivae/corneas clear. Pupils equal and round. No injections noted. Neck: Supple. No lesions, scars or masses. Trachea midline. Respiratory:  Normal respiratory effort. Diminished air movement with few scattered Wheezes/Rhonchi. Cardiovascular: Regular rate and rhythm with normal S1/S2 without murmurs, rubs or gallops. Abdomen: Soft, non-tender, non-distended with normal bowel sounds. Musculoskeletal: No clubbing, cyanosis or edema bilaterally. Brisk capillary refill. 2+ lower extremity pulses (dorsalis pedis). Skin:  No rashes. Bruises of face and left forearm. Neurologic: awake, alert and following commands       Labs:   Recent Labs      07/14/18   2215  07/16/18   1049  07/17/18   0559   WBC  5.1  5.3  6.2   HGB  13.6  12.2  11.8   HCT  43.0  38.2  37.6   PLT  172  166  150     Recent Labs      07/15/18   0802   07/16/18   0714  07/16/18   1049  07/17/18   0559   NA  141   --   133   --   137   K  5.3*   < >  5.7*  4.9  4.9   CL  103   --   102   --   99   CO2  26   --   18*   --   28   BUN  34*   --   38*   --   44*   CREATININE  1.6*   --   1.5*   --   1.4*   CALCIUM  8.8   --   9.1   --   9.5    < > = values in this interval not displayed. left vertebral artery suggesting stenosis,   correlation with MR angiogram or CT angiogram may be useful         CT CERVICAL SPINE WO CONTRAST   Final Result     No CT evidence of acute C-spine injury. Cervical spondylosis/degenerative    disc disease as detailed above. Areas of minimal spondylolisthesis most likely    degenerative as discussed above. For persistent or progressive symptoms, could further investigate with MRI. This report has been electronically signed by Reggie Spann MD.      XR CHEST STANDARD (2 VW)   Final Result   Tortuous ectatic aorta                     CT Head WO Contrast   Final Result   No acute intracranial hemorrhage.             Assessment/Plan:  Active Hospital Problems    Diagnosis Date Noted    Asymptomatic stenosis of left carotid artery [I65.22]     Acute CVA (cerebrovascular accident) ([facility] [address] [I63.9] 07/15/2018    Acute respiratory failure with hypercapnia (HCC) [J96.02] 07/15/2018    Left arm weakness [R29.898] 07/14/2018    Stroke-like symptoms [R29.90] 07/14/2018    Acute kidney injury superimposed on chronic kidney disease ([facility] [address] [N17.9, N18.9] 07/14/2018    UTI (urinary tract infection) [N39.0] 07/14/2018    Type 2 diabetes mellitus with renal complication (HCC) [J11.30] 07/14/2018    HTN (hypertension) [I10] 07/14/2018    HLD (hyperlipidemia) [E78.5] 07/14/2018    COPD (chronic obstructive pulmonary disease) ([facility] [address] [J44.9] 07/14/2018    CAD (coronary artery disease) [I25.10] 07/14/2018    Hypothyroidism [E03.9] 07/14/2018    Tobacco dependence [F17.200] 07/14/2018    Hernia of anterior abdominal wall [K43.9] 07/14/2018    History of recent fall [Z91.81] 07/14/2018    Soft tissue injury [T14.90XA] 07/14/2018       Plan:  · AVAPS at night  · Pulmonology on board  · Neurology recommends continuing DAPT  · US of BL LE negative  · Continue with rocephin for likely UTI - urine culture finally obtained today  · Continue with IV rocephin today, likely transition

## 2018-07-17 NOTE — PROGRESS NOTES
drift  5/5 both legs  No abnormal movements    Sensory:  LT and PP decreased L arm    Coordination:   FN and FFM impaired on L relative to weakness    DTR:   Right Brachioradialis reflex 2+  Left Brachioradialis reflex 2+  Right Biceps reflex 2+  Left Biceps reflex 2+  Right Quadriceps reflex 2+  Left Quadriceps reflex NOT DONE---due to abrasions  Right Achilles reflex 1+  Left Achilles reflex 1+    No Babinskis  No Mcwilliams's    No pathological reflexes    Laboratory/Radiology:      Ref. Range 2018 07:14   Cholesterol, Total Latest Ref Range: 0 - 199 mg/dL 141   HDL Cholesterol Latest Ref Range: >40 mg/dL 33   LDL Calculated Latest Ref Range: 0 - 99 mg/dL 76   Triglycerides Latest Ref Range: 0 - 149 mg/dL 162 (H)   VLDL Cholesterol Calculated Latest Units: mg/dL 32      Ref. Range 7/15/2018 08:02   Hemoglobin A1C Latest Ref Range: 4.0 - 5.6 % 5.9 (H)     CT head: remote R frontal lobe stroke; remote R lacunar stroke; remote R temporal lobe stroke; remote R cerebellar stroke; marked remote     US carotids: Atherosclerotic disease. No hemodynamically significant stenosis is identified Estimated stenosis by NASCET criteria in the proximal right carotid artery is occluded Estimated stenosis by NASCET criteria in the proximal left carotid artery is between 50% to 69%. Elevated velocity in the left vertebral artery suggesting stenosis    MRI brain: acute embolic strokes in her R MCA territory; remote R MCA strokes as well    MRI c-spine Multilevel degenerative disc disease extending from C3 through C7. Moderately severe spinal canal stenosis thought to be present at C5-C6. Other aspects of the exam is severely limited by motion artifact. There is questionable abnormal signal intensity extending from C4-C5 level which could be reflective of underlying degree of myelomalacia changes versus possible artifact. 2. See above for details.  3. Suspected cerebral volume loss/atrophy incompletely visualized on limited visualization of the brain. All labs and images personally reviewed today    Assessment:     The patient has a history of R MCA strokes---and suffered more acute ischemic strokes in her R MCA---secondary to artery to artery emboli from her known R ICA occlusion---causing her L sided weakness    Her neuro exam is improving but she will likely need rehab. She is on DAPT-- (full strength) ASA and Plavix and I will maximize her Lipitor to 80 mg. No surgical intervention planned for her R ICA occlusion. MRI of the c-spine proved moderately severe spinal canal stenosis. Fall precautions were reviewed. I had a discussion with her regarding the importance of tobacco cessation---even after having smoked for so many years---as she remains at risk for future strokes. She is contemplative at this time. Plan:     Increase Lipitor 80 mg    Continue DAPT (full strength ASA)    She must quit smoking!     Plan for follow up in stroke clinic if she goes home or in our office if she goes to rehab    Neuro to sign off---call with new issues    AMELIA Vaca, CNP  8:45 AM  7/17/2018

## 2018-07-17 NOTE — PLAN OF CARE
Problem: Pain:  Goal: Pain level will decrease  Pain level will decrease   Outcome: Met This Shift      Problem: HEMODYNAMIC STATUS  Goal: Patient has stable vital signs and fluid balance  Outcome: Met This Shift

## 2018-07-18 LAB
ANION GAP SERPL CALCULATED.3IONS-SCNC: 8 MMOL/L (ref 7–16)
ANION GAP SERPL CALCULATED.3IONS-SCNC: 9 MMOL/L (ref 7–16)
ANISOCYTOSIS: ABNORMAL
BASOPHILIC STIPPLING: ABNORMAL
BASOPHILS ABSOLUTE: 0.02 E9/L (ref 0–0.2)
BASOPHILS RELATIVE PERCENT: 0.3 % (ref 0–2)
BUN BLDV-MCNC: 41 MG/DL (ref 8–23)
BUN BLDV-MCNC: 43 MG/DL (ref 8–23)
CALCIUM SERPL-MCNC: 9.4 MG/DL (ref 8.6–10.2)
CALCIUM SERPL-MCNC: 9.8 MG/DL (ref 8.6–10.2)
CHLORIDE BLD-SCNC: 100 MMOL/L (ref 98–107)
CHLORIDE BLD-SCNC: 98 MMOL/L (ref 98–107)
CO2: 29 MMOL/L (ref 22–29)
CO2: 35 MMOL/L (ref 22–29)
CREAT SERPL-MCNC: 1.4 MG/DL (ref 0.5–1)
CREAT SERPL-MCNC: 1.5 MG/DL (ref 0.5–1)
EOSINOPHILS ABSOLUTE: 0 E9/L (ref 0.05–0.5)
EOSINOPHILS RELATIVE PERCENT: 0 % (ref 0–6)
GFR AFRICAN AMERICAN: 41
GFR AFRICAN AMERICAN: 45
GFR NON-AFRICAN AMERICAN: 34 ML/MIN/1.73
GFR NON-AFRICAN AMERICAN: 37 ML/MIN/1.73
GLUCOSE BLD-MCNC: 130 MG/DL (ref 74–109)
GLUCOSE BLD-MCNC: 162 MG/DL (ref 74–109)
HCT VFR BLD CALC: 36.4 % (ref 34–48)
HEMOGLOBIN: 11.4 G/DL (ref 11.5–15.5)
IMMATURE GRANULOCYTES #: 0.18 E9/L
IMMATURE GRANULOCYTES %: 3 % (ref 0–5)
LYMPHOCYTES ABSOLUTE: 0.3 E9/L (ref 1.5–4)
LYMPHOCYTES RELATIVE PERCENT: 5 % (ref 20–42)
MCH RBC QN AUTO: 29.8 PG (ref 26–35)
MCHC RBC AUTO-ENTMCNC: 31.3 % (ref 32–34.5)
MCV RBC AUTO: 95 FL (ref 80–99.9)
METER GLUCOSE: 133 MG/DL (ref 70–110)
METER GLUCOSE: 146 MG/DL (ref 70–110)
METER GLUCOSE: 156 MG/DL (ref 70–110)
MONOCYTES ABSOLUTE: 0.23 E9/L (ref 0.1–0.95)
MONOCYTES RELATIVE PERCENT: 3.9 % (ref 2–12)
NEUTROPHILS ABSOLUTE: 5.24 E9/L (ref 1.8–7.3)
NEUTROPHILS RELATIVE PERCENT: 87.8 % (ref 43–80)
OSMOLALITY: 309 MOSM/KG (ref 285–310)
OVALOCYTES: ABNORMAL
PDW BLD-RTO: 14 FL (ref 11.5–15)
PLATELET # BLD: 143 E9/L (ref 130–450)
PMV BLD AUTO: 10.2 FL (ref 7–12)
POIKILOCYTES: ABNORMAL
POLYCHROMASIA: ABNORMAL
POTASSIUM SERPL-SCNC: 4.6 MMOL/L (ref 3.5–5)
POTASSIUM SERPL-SCNC: 5.8 MMOL/L (ref 3.5–5)
RBC # BLD: 3.83 E12/L (ref 3.5–5.5)
SODIUM BLD-SCNC: 138 MMOL/L (ref 132–146)
SODIUM BLD-SCNC: 141 MMOL/L (ref 132–146)
WBC # BLD: 6 E9/L (ref 4.5–11.5)

## 2018-07-18 PROCEDURE — 80048 BASIC METABOLIC PNL TOTAL CA: CPT

## 2018-07-18 PROCEDURE — 94660 CPAP INITIATION&MGMT: CPT

## 2018-07-18 PROCEDURE — 94640 AIRWAY INHALATION TREATMENT: CPT

## 2018-07-18 PROCEDURE — 2060000000 HC ICU INTERMEDIATE R&B

## 2018-07-18 PROCEDURE — 83930 ASSAY OF BLOOD OSMOLALITY: CPT

## 2018-07-18 PROCEDURE — 6370000000 HC RX 637 (ALT 250 FOR IP): Performed by: NURSE PRACTITIONER

## 2018-07-18 PROCEDURE — 2700000000 HC OXYGEN THERAPY PER DAY

## 2018-07-18 PROCEDURE — 99232 SBSQ HOSP IP/OBS MODERATE 35: CPT | Performed by: INTERNAL MEDICINE

## 2018-07-18 PROCEDURE — 6360000002 HC RX W HCPCS: Performed by: FAMILY MEDICINE

## 2018-07-18 PROCEDURE — 6370000000 HC RX 637 (ALT 250 FOR IP): Performed by: FAMILY MEDICINE

## 2018-07-18 PROCEDURE — 6370000000 HC RX 637 (ALT 250 FOR IP): Performed by: INTERNAL MEDICINE

## 2018-07-18 PROCEDURE — 2580000003 HC RX 258: Performed by: FAMILY MEDICINE

## 2018-07-18 PROCEDURE — 85025 COMPLETE CBC W/AUTO DIFF WBC: CPT

## 2018-07-18 PROCEDURE — 36415 COLL VENOUS BLD VENIPUNCTURE: CPT

## 2018-07-18 PROCEDURE — 82962 GLUCOSE BLOOD TEST: CPT

## 2018-07-18 RX ORDER — HYDRALAZINE HYDROCHLORIDE 20 MG/ML
10 INJECTION INTRAMUSCULAR; INTRAVENOUS EVERY 6 HOURS PRN
Status: DISCONTINUED | OUTPATIENT
Start: 2018-07-18 | End: 2018-07-20 | Stop reason: HOSPADM

## 2018-07-18 RX ORDER — SODIUM POLYSTYRENE SULFONATE 15 G/60ML
15 SUSPENSION ORAL; RECTAL ONCE
Status: COMPLETED | OUTPATIENT
Start: 2018-07-18 | End: 2018-07-18

## 2018-07-18 RX ADMIN — Medication 10 ML: at 00:05

## 2018-07-18 RX ADMIN — CLONAZEPAM 0.5 MG: 0.5 TABLET ORAL at 22:04

## 2018-07-18 RX ADMIN — CALCIUM CARBONATE-VITAMIN D TAB 500 MG-200 UNIT 1 TABLET: 500-200 TAB at 08:36

## 2018-07-18 RX ADMIN — GABAPENTIN 100 MG: 100 CAPSULE ORAL at 22:05

## 2018-07-18 RX ADMIN — LEVOTHYROXINE SODIUM 137 MCG: 112 TABLET ORAL at 06:55

## 2018-07-18 RX ADMIN — WATER 1 G: 1 INJECTION INTRAMUSCULAR; INTRAVENOUS; SUBCUTANEOUS at 00:05

## 2018-07-18 RX ADMIN — Medication 10 ML: at 22:04

## 2018-07-18 RX ADMIN — CETIRIZINE HYDROCHLORIDE 5 MG: 10 TABLET, FILM COATED ORAL at 08:36

## 2018-07-18 RX ADMIN — GABAPENTIN 100 MG: 100 CAPSULE ORAL at 08:37

## 2018-07-18 RX ADMIN — ZOLPIDEM TARTRATE 5 MG: 5 TABLET ORAL at 22:04

## 2018-07-18 RX ADMIN — LINAGLIPTIN 5 MG: 5 TABLET, FILM COATED ORAL at 08:36

## 2018-07-18 RX ADMIN — IPRATROPIUM BROMIDE AND ALBUTEROL SULFATE 1 AMPULE: 2.5; .5 SOLUTION RESPIRATORY (INHALATION) at 09:55

## 2018-07-18 RX ADMIN — IPRATROPIUM BROMIDE AND ALBUTEROL SULFATE 1 AMPULE: 2.5; .5 SOLUTION RESPIRATORY (INHALATION) at 17:15

## 2018-07-18 RX ADMIN — ASPIRIN 325 MG: 325 TABLET ORAL at 08:36

## 2018-07-18 RX ADMIN — PANTOPRAZOLE SODIUM 40 MG: 40 TABLET, DELAYED RELEASE ORAL at 08:36

## 2018-07-18 RX ADMIN — FENOFIBRATE 160 MG: 160 TABLET ORAL at 08:36

## 2018-07-18 RX ADMIN — Medication 10 ML: at 08:37

## 2018-07-18 RX ADMIN — CLONAZEPAM 0.5 MG: 0.5 TABLET ORAL at 08:36

## 2018-07-18 RX ADMIN — CALCIUM CARBONATE-VITAMIN D TAB 500 MG-200 UNIT 1 TABLET: 500-200 TAB at 22:04

## 2018-07-18 RX ADMIN — BUDESONIDE 500 MCG: 0.5 SUSPENSION RESPIRATORY (INHALATION) at 09:59

## 2018-07-18 RX ADMIN — DILTIAZEM HYDROCHLORIDE 120 MG: 120 CAPSULE, COATED, EXTENDED RELEASE ORAL at 08:36

## 2018-07-18 RX ADMIN — CLOPIDOGREL 75 MG: 75 TABLET, FILM COATED ORAL at 08:36

## 2018-07-18 RX ADMIN — IPRATROPIUM BROMIDE AND ALBUTEROL SULFATE 1 AMPULE: 2.5; .5 SOLUTION RESPIRATORY (INHALATION) at 21:19

## 2018-07-18 RX ADMIN — GABAPENTIN 100 MG: 100 CAPSULE ORAL at 17:19

## 2018-07-18 RX ADMIN — OXYCODONE HYDROCHLORIDE 15 MG: 5 TABLET ORAL at 17:19

## 2018-07-18 RX ADMIN — SODIUM POLYSTYRENE SULFONATE 15 G: 15 SUSPENSION ORAL; RECTAL at 10:55

## 2018-07-18 RX ADMIN — CITALOPRAM 20 MG: 20 TABLET, FILM COATED ORAL at 22:04

## 2018-07-18 RX ADMIN — METOPROLOL TARTRATE 25 MG: 25 TABLET ORAL at 08:36

## 2018-07-18 RX ADMIN — INSULIN LISPRO 2 UNITS: 100 INJECTION, SOLUTION INTRAVENOUS; SUBCUTANEOUS at 22:00

## 2018-07-18 RX ADMIN — ATORVASTATIN CALCIUM 80 MG: 40 TABLET, FILM COATED ORAL at 22:05

## 2018-07-18 ASSESSMENT — PAIN SCALES - GENERAL
PAINLEVEL_OUTOF10: 8
PAINLEVEL_OUTOF10: 4

## 2018-07-18 ASSESSMENT — PAIN DESCRIPTION - LOCATION: LOCATION: BACK

## 2018-07-18 ASSESSMENT — PAIN DESCRIPTION - DESCRIPTORS: DESCRIPTORS: ACHING;DISCOMFORT;CRAMPING

## 2018-07-18 ASSESSMENT — PAIN DESCRIPTION - PAIN TYPE: TYPE: CHRONIC PAIN

## 2018-07-18 NOTE — PROGRESS NOTES
metoprolol tartrate  25 mg Oral Daily    pantoprazole  40 mg Oral Daily    linagliptin  5 mg Oral Daily    sodium chloride flush  10 mL Intravenous 2 times per day    cefTRIAXone (ROCEPHIN) IV  1 g Intravenous Q24H     PRN Meds: hydrALAZINE, albuterol sulfate HFA, oxyCODONE, zolpidem, sodium chloride flush, magnesium hydroxide, ondansetron, ipratropium-albuterol      Intake/Output Summary (Last 24 hours) at 07/18/18 1525  Last data filed at 07/18/18 1230   Gross per 24 hour   Intake              960 ml   Output                0 ml   Net              960 ml       Exam:    BP (!) 158/79   Pulse 63   Temp 98.3 °F (36.8 °C) (Oral)   Resp 20   Ht 5' (1.524 m)   Wt 176 lb 1.6 oz (79.9 kg)   SpO2 98%   BMI 34.39 kg/m²     General appearance: No apparent distress, appears stated age and cooperative. HEENT: Conjunctivae/corneas clear. Pupils equal and round. No injections noted. Neck: Supple. No lesions, scars or masses. Trachea midline. Respiratory:  Normal respiratory effort. Diminished air movement with few scattered Wheezes/Rhonchi. Cardiovascular: Regular rate and rhythm with normal S1/S2 without murmurs, rubs or gallops. Abdomen: Soft, non-tender, non-distended with normal bowel sounds. Musculoskeletal: No clubbing, cyanosis or edema bilaterally. Brisk capillary refill. 2+ lower extremity pulses (dorsalis pedis). Skin:  No rashes. Bruises of face and left forearm.      Neurologic: awake, alert and following commands       Labs:   Recent Labs      07/16/18   1049  07/17/18   0559  07/18/18   0634   WBC  5.3  6.2  6.0   HGB  12.2  11.8  11.4*   HCT  38.2  37.6  36.4   PLT  166  150  143     Recent Labs      07/16/18   0714  07/16/18   1049  07/17/18   0559  07/18/18   0634   NA  133   --   137  138   K  5.7*  4.9  4.9  5.8*   CL  102   --   99  100   CO2  18*   --   28  29   BUN  38*   --   44*  43*   CREATININE  1.5*   --   1.4*  1.5*   CALCIUM  9.1   --   9.5  9.4     No results for input(s): AST, be useful         CT CERVICAL SPINE WO CONTRAST   Final Result     No CT evidence of acute C-spine injury. Cervical spondylosis/degenerative    disc disease as detailed above. Areas of minimal spondylolisthesis most likely    degenerative as discussed above. For persistent or progressive symptoms, could further investigate with MRI. This report has been electronically signed by Foster Márquez MD.      XR CHEST STANDARD (2 VW)   Final Result   Tortuous ectatic aorta                     CT Head WO Contrast   Final Result   No acute intracranial hemorrhage. Assessment/Plan:  Active Hospital Problems    Diagnosis Date Noted    Asymptomatic stenosis of left carotid artery [I65.22]     Acute CVA (cerebrovascular accident) ([facility] [address] [I63.9] 07/15/2018    Acute respiratory failure with hypercapnia (HCC) [J96.02] 07/15/2018    Left arm weakness [R29.898] 07/14/2018    Stroke-like symptoms [R29.90] 07/14/2018    Acute kidney injury superimposed on chronic kidney disease ([address] [N17.9, N18.9] 07/14/2018    UTI (urinary tract infection) [N39.0] 07/14/2018    Type 2 diabetes mellitus with renal complication (HCC) [L24.62] 07/14/2018    HTN (hypertension) [I10] 07/14/2018    HLD (hyperlipidemia) [E78.5] 07/14/2018    COPD (chronic obstructive pulmonary disease) ([facility] [address] [J44.9] 07/14/2018    CAD (coronary artery disease) [I25.10] 07/14/2018    Hypothyroidism [E03.9] 07/14/2018    Tobacco dependence [F17.200] 07/14/2018    Hernia of anterior abdominal wall [K43.9] 07/14/2018    History of recent fall [Z91.81] 07/14/2018    Soft tissue injury [T14.90XA] 07/14/2018       Plan:  · AVAPS at night  · Pulmonology on board  · Neurology recommends continuing DAPT  · US of BL LE negative  · Continue with rocephin for likely UTI - Await culture results  · Continue with IV rocephin today, likely transition to oral abx at discharge           Body mass index is 34.39 kg/m².     · DVT

## 2018-07-18 NOTE — PROGRESS NOTES
Occupational Therapy  Date:2018  Patient Name: Melba Doshi  MRN: [id number]  : 1945  Room: [facility]    Pt's chart reviewed and treatment attempted however pt deferred this PM d/t fatigue. Pt falling asleep throughout conversation with therapist.  Pt educated on benefits of therapy. Will attempt treatment at a later date/time.     Maranda BOSWELL/L [id number]

## 2018-07-18 NOTE — PROGRESS NOTES
[place]  Division of Pulmonary, Critical Care and Sleep Medicine  Progress Note      Admit Date:  7/14/2018    MRN:   [id number]        Patient:        PCP:   Dwayne Rogers DO    CONSULT : Acute respiratory Failure      SUBJECTIVE:    Work up for PE negative  MRI proved acute embolic R MCA strokes  Discharge planning      OBJECTIVE:     PHYSICAL EXAM:   VITALS:   Patient Vitals for the past 8 hrs:   BP Temp Temp src Pulse Resp SpO2 Weight   07/18/18 0955 - - - - 20 98 % -   07/18/18 0753 (!) 202/86 98.6 °F (37 °C) - 62 20 100 % -   07/18/18 0526 (!) 178/65 97.2 °F (36.2 °C) Oral 64 18 98 % 176 lb 1.6 oz (79.9 kg)       Intake/Output Summary (Last 24 hours) at 07/18/18 1037  Last data filed at 07/18/18 0620   Gross per 24 hour   Intake              780 ml   Output                0 ml   Net              780 ml        CONSTITUTIONAL:   A&O x 3,   SKIN:    No rash, No suspicious lesions  HEENT:    EOMI, MMM, No thrush  NECK:   No bruits, No JVP apprechiated  CV:     Sinus,  No Murmus, No Rubs, No gallop  PULMONARY:  Couse,  No Wheezing, No Rales, No Rhonchi or Egophony  ABDOMEN:    Soft, non-tender. BS normal. No R/R/G  EXT:   Chornic skin discoloration.   Extremity edema, No venous stasis  PULSE:  Peripheral pulses barely palpable   PSYCHIATRIC: Approprate  MS:   Weakness  NEUROLOGIC:  Non focal exam.     DATA: IMAGING & TESTING:     LABORATORY TESTS:    CBC:   Lab Results   Component Value Date    WBC 6.0 07/18/2018    RBC 3.83 07/18/2018    HGB 11.4 07/18/2018    HCT 36.4 07/18/2018    MCV 95.0 07/18/2018    MCH 29.8 07/18/2018    MCHC 31.3 07/18/2018    RDW 14.0 07/18/2018     07/18/2018    MPV 10.2 07/18/2018     CMP:    Lab Results   Component Value Date     07/18/2018    K 5.8 07/18/2018     07/18/2018    CO2 29 07/18/2018    BUN 43 07/18/2018    CREATININE 1.5 07/18/2018    GFRAA 41 07/18/2018    LABGLOM 34 07/18/2018    GLUCOSE 162 07/18/2018    GLUCOSE RETROPERITONEAL COMPLETE NUMBER OF IMAGES:  62 views INDICATION:  RENAL FAILURE, ACUTE (KIDNEY INJURY) COMPARISON: CT scan 2017 The right kidney measures 8 x 2.2 x 4.3 cm The left kidney  measures 11.5 x 5.3 x 5.6 and There is a hypoechoic nodule superior to the left kidney when compared with the previous CT scan I suspect represents an accessory spleen There is no hydronephrosis identified There is no calculus identified The bladder is unremarkable     Right renal atrophy     Us Carotid Artery Bilateral    Result Date: 7/15/2018  Patient MRN:  [id number] : 1945 Age: 67 years Gender: Female Order Date:  7/15/2018 7:00 AM EXAM: US CAROTID ARTERY BILATERAL NUMBER OF IMAGES:  70 INDICATION: Stroke COMPARISON: None FINDINGS: Velocities are left are mildly elevated ICA\CCA ratios are on the left are mildly elevated  The right vertebral artery doppler images demonstrate  antegrade flow. The left vertebral artery doppler images demonstrate elevated velocity in the left vertebral artery Grey scale images demonstrate moderate plaque identified in the right and left carotid arteries. The right proximal internal carotid artery is noted to be occluded     Atherosclerotic disease. No hemodynamically significant stenosis is identified Estimated stenosis by NASCET criteria in the proximal right carotid artery is occluded Estimated stenosis by NASCET criteria in the proximal left carotid artery is between 50% to 69%. Elevated velocity in the left vertebral artery suggesting stenosis, correlation with MR angiogram or CT angiogram may be useful       Assessment  1. COPD with AECOPD  2. Smoker  3. Hx of COPD  4. CVA new     Plan    1. Home nebulizer may be of help  2. Continue with bronchodialtors  3. AVAPS QHS  4. Stop smoking  5. On rocephin continue for 5 days  6. No narcotics or benzo  7.  Rehabilitation needed? - she refuses        Trinity Moraes DO, MPH, FCCP, FACCHERELLE, [place],

## 2018-07-18 NOTE — CONSULTS
Nephrology Consult Note    Patient's Name: Ashanti Devries  3:20 PM  7/18/2018    Nephrologist: Darien Mckeon MD    Reason for Consult:  Renal is consulted for hyperkalemia  Requesting Physician: Dr. Nadir Shaw  Chief Complaint:  Left arm weakness, she is admitted on 7/14/2018    History Obtained From:  Patient and EMR    History of Present Ilness:    Ashanti Devries is a 67 y.o. female with past medical history of hypertension on an ACE inhibitor, diabetes, COPD on home oxygen, hyperlipidemia, coronary artery disease status post CABG and stent placements, hypothyroidism, presented to the ER with chief complaints of left upper extremity weakness that started 2 days prior to that admission. A week before admission she had a fall at home while climbing stairs. She had no other symptoms including headache, speech impairment, dizziness, lightheadedness. She does have history of diabetic neuropathy. Labs on admission showed a creatinine of 1.6 with a BUN of 36 and an estimated GFR of 28 on admission.  She also has persistent hyperkalemia and we are consulted for further management  Lisinopril was discontinued this a.m. and she received Kayexalate  She also received gentle IV hydration for a couple of days  Reports no shortness of breath chest pain are worsening edema    Past Medical History:   Diagnosis Date    Arthritis     CAD (coronary artery disease)     COPD (chronic obstructive pulmonary disease) ([address]     [facility]     HTN (hypertension)     Hyperlipidemia     [facility]omyelitis [facility]     Thyroid disease        Past Surgical History:   Procedure Laterality Date    CARDIAC SURGERY      CHOLECYSTECTOMY      COLONOSCOPY      CORONARY ARTERY BYPASS GRAFT N/A May 22, 2013    ENDOSCOPY, COLON, DIAGNOSTIC      HYSTERECTOMY      JOINT REPLACEMENT      bilateral knees       Family History   Problem Relation Age of Onset    Heart Attack Mother     Hypertension Mother     Heart Attack Father     Cancer 2018    PHUR 5.5 2018    WBCUA >20 2018    RBCUA 2-5 2018    RBCUA NONE 2013    BACTERIA MANY 2018    CLARITYU CLOUDY 2018    SPECGRAV >=1.030 2018    LEUKOCYTESUR MODERATE 2018    UROBILINOGEN 0.2 2018    BILIRUBINUR SMALL 2018    BLOODU SMALL 2018    GLUCOSEU Negative 2018    KETUA TRACE 2018        Imaging:  Xr Chest Standard (2 Vw)    Result Date: 2018  Patient MRN: [id number] : 1945 Age:  67 years Gender: Female Order Date: 2018 8:30 PM Exam: XR CHEST (2 VW) Number of Images: 1 view Indication:   cp cp Comparison: None. Findings: The heart is unremarkable. The lung fields are unremarkable. The aorta is tortuous and ectatic. Tortuous ectatic aorta     Ct Head Wo Contrast    Result Date: 2018  CT HEAD WO CONTRAST INDICATION:  left side weakness  COMPARISON: 2016 MRI TECHNIQUE: Routine axial images through the head without contrast. Coronal and sagittal reformations. Low-dose CT  acquisition technique included one of following options: 1 . Automated exposure control 2. Adjustment of MA and or KV according to patient's size or 3. Use of iterative reconstruction. FINDINGS: There are periventricular and deep white matter hypodensities, likely related to chronic microvascular ischemic changes. Right basal ganglia old lacunar infarct noted. Ventricular and cisternal spaces are normal in size, shape and configuration for a patient of this age. No dominant mass, midline shift, hydrocephalus, extra-axial fluid collection or acute hemorrhage. No acute sinus disease. Skull is intact. Intraorbital contents are unremarkable     No acute intracranial hemorrhage. Ct Cervical Spine Wo Contrast    Result Date: 7/15/2018  EXAM:   CT Cervical Spine Without Intravenous Contrast CLINICAL HISTORY:   67years old, female; Signs and symptoms; Weakness; Additional info: Trauma, left arm weakness.  TECHNIQUE:   Thin section images through the cervical spine in transaxial plane with sagittal and coronal reformations. Coronal and sagittal reformatted images were created and reviewed. All CT scans at this facility use at least one of these dose optimization techniques: automated exposure control; mA and/or kV adjustment per patient size (includes targeted exams where dose is matched to clinical indication); or iterative reconstruction. COMPARISON:   No relevant prior studies available. FINDINGS:   No fracture, traumatic subluxation or other evidence of acute bony injury. No abnormal prevertebral/paraspinous soft tissue swelling. There is cervical spondylosis/degenerative disc disease present with multilevel mild canal indentation/narrowing due to areas of disc bulge/protrusion, spur C3-4, C4-5, C5-6, C6-7 as well as areas of neural foraminal narrowing due to osteophyte formation particularly on the right side at C3-4, C4-5, bilaterally at C5-6, on the left side at C6-7. Slight anterolisthesis C4 on C5, retrolisthesis C5 on C6, most likely on a degenerative basis. No CT evidence of acute C-spine injury. Cervical spondylosis/degenerative disc disease as detailed above. Areas of minimal spondylolisthesis most likely degenerative as discussed above. For persistent or progressive symptoms, could further investigate with MRI. This report has been electronically signed by Poonam Osborne MD.    Mri Cervical Spine Wo Contrast    Result Date: 2018  Patient MRN: [id number] : 1945 Age:  67 years Gender: Female Order Date: 7/15/2018 4:45 PM Exam: MRI CERVICAL SPINE WO CONTRAST Number of Views: 141 Indication:  Left upper extremity weakness Comparison: CT of the cervical spine 7/15/2018. Findings: No evidence of STIR hyperintensity to suggest an acute fracture or ligamentous injury. Normal sagittal alignment visualized cervical and thoracic spine.  Diffuse disc desiccation throughout the visualized cervical and thoracic 5.6 and There is a hypoechoic nodule superior to the left kidney when compared with the previous CT scan I suspect represents an accessory spleen There is no hydronephrosis identified There is no calculus identified The bladder is unremarkable     Right renal atrophy     Mri Brain Wo Contrast    Result Date: 2018  Patient MRN:  [id number] : 1945 Age: 67 years Gender: Female EXAM: MRI BRAIN WO CONTRAST INDICATION: Suspicion for stroke with a left upper extremity weakness COMPARISON: CT head 2018 MRI brain 2016 FINDINGS: Multiple foci of restricted diffusion with corresponding T2 FLAIR hyperintensity identified right MCA distribution which include the right frontal lobe right parietal lobe and right temporal lobe notably involving the right middle frontal gyrus, right precentral gyrus, and postcentral gyrus. No findings of hemorrhagic transformation identified. There are multiple areas of gliosis identified involving the right frontal and parietal centrum semiovale, subcortical white matter right parietal lobe and the right peritrigonal white matter. Remote lacunar infarct right head of the caudate nucleus. Mild-to-moderate burden of periventricular and subcortical white matter T2/FLAIR hyperintensity is nonspecific but suggestive of chronic small vessel ischemic disease. Moderate white matter disease involving the janessa midbrain also suggestive chronic small vessel ischemic disease. A few scattered foci of T2 hyperintensity involving both cerebral hemispheres suggestive of areas of gliosis as well. No shift of midline structure. Basal cisterns are patent. Mild ethmoid sinus mucosal thickening. Small mucous retention cysts identified both maxillary sinus antrum. Loss of normal flow void involving the right petrous segment of the ICA. Multifocal acute subacute MCA distribution infarcts without findings of hemorrhagic transformation.  Mild-to-moderate chronic small ischemic disease with evidence

## 2018-07-19 LAB
ANION GAP SERPL CALCULATED.3IONS-SCNC: 8 MMOL/L (ref 7–16)
BASOPHILS ABSOLUTE: 0.02 E9/L (ref 0–0.2)
BASOPHILS RELATIVE PERCENT: 0.4 % (ref 0–2)
BUN BLDV-MCNC: 43 MG/DL (ref 8–23)
CALCIUM SERPL-MCNC: 9.3 MG/DL (ref 8.6–10.2)
CHLORIDE BLD-SCNC: 97 MMOL/L (ref 98–107)
CHLORIDE URINE RANDOM: 100 MMOL/L
CO2: 33 MMOL/L (ref 22–29)
CREAT SERPL-MCNC: 1.5 MG/DL (ref 0.5–1)
CREATININE URINE: 42 MG/DL (ref 29–226)
EOSINOPHILS ABSOLUTE: 0.14 E9/L (ref 0.05–0.5)
EOSINOPHILS RELATIVE PERCENT: 2.7 % (ref 0–6)
GFR AFRICAN AMERICAN: 41
GFR NON-AFRICAN AMERICAN: 34 ML/MIN/1.73
GLUCOSE BLD-MCNC: 77 MG/DL (ref 74–109)
HCT VFR BLD CALC: 38.7 % (ref 34–48)
HEMOGLOBIN: 12.3 G/DL (ref 11.5–15.5)
IMMATURE GRANULOCYTES #: 0.15 E9/L
IMMATURE GRANULOCYTES %: 2.9 % (ref 0–5)
LYMPHOCYTES ABSOLUTE: 0.78 E9/L (ref 1.5–4)
LYMPHOCYTES RELATIVE PERCENT: 15.1 % (ref 20–42)
MCH RBC QN AUTO: 29.9 PG (ref 26–35)
MCHC RBC AUTO-ENTMCNC: 31.8 % (ref 32–34.5)
MCV RBC AUTO: 94.2 FL (ref 80–99.9)
METER GLUCOSE: 102 MG/DL (ref 70–110)
METER GLUCOSE: 129 MG/DL (ref 70–110)
METER GLUCOSE: 129 MG/DL (ref 70–110)
METER GLUCOSE: 88 MG/DL (ref 70–110)
MONOCYTES ABSOLUTE: 0.48 E9/L (ref 0.1–0.95)
MONOCYTES RELATIVE PERCENT: 9.3 % (ref 2–12)
NEUTROPHILS ABSOLUTE: 3.61 E9/L (ref 1.8–7.3)
NEUTROPHILS RELATIVE PERCENT: 69.6 % (ref 43–80)
ORGANISM: ABNORMAL
OSMOLALITY URINE: 514 MOSM/KG (ref 300–900)
PDW BLD-RTO: 14.2 FL (ref 11.5–15)
PLATELET # BLD: 152 E9/L (ref 130–450)
PMV BLD AUTO: 10 FL (ref 7–12)
POTASSIUM SERPL-SCNC: 5.2 MMOL/L (ref 3.5–5)
POTASSIUM, UR: 32.5 MMOL/L
RBC # BLD: 4.11 E12/L (ref 3.5–5.5)
SODIUM BLD-SCNC: 138 MMOL/L (ref 132–146)
SODIUM URINE: 89 MMOL/L
URINE CULTURE, ROUTINE: ABNORMAL
URINE CULTURE, ROUTINE: ABNORMAL
WBC # BLD: 5.2 E9/L (ref 4.5–11.5)

## 2018-07-19 PROCEDURE — 82570 ASSAY OF URINE CREATININE: CPT

## 2018-07-19 PROCEDURE — 2060000000 HC ICU INTERMEDIATE R&B

## 2018-07-19 PROCEDURE — 2580000003 HC RX 258: Performed by: FAMILY MEDICINE

## 2018-07-19 PROCEDURE — 99232 SBSQ HOSP IP/OBS MODERATE 35: CPT | Performed by: INTERNAL MEDICINE

## 2018-07-19 PROCEDURE — 6370000000 HC RX 637 (ALT 250 FOR IP): Performed by: INTERNAL MEDICINE

## 2018-07-19 PROCEDURE — 80048 BASIC METABOLIC PNL TOTAL CA: CPT

## 2018-07-19 PROCEDURE — 6360000002 HC RX W HCPCS: Performed by: FAMILY MEDICINE

## 2018-07-19 PROCEDURE — 94660 CPAP INITIATION&MGMT: CPT

## 2018-07-19 PROCEDURE — 6370000000 HC RX 637 (ALT 250 FOR IP): Performed by: NURSE PRACTITIONER

## 2018-07-19 PROCEDURE — 82088 ASSAY OF ALDOSTERONE: CPT

## 2018-07-19 PROCEDURE — 94640 AIRWAY INHALATION TREATMENT: CPT

## 2018-07-19 PROCEDURE — 84244 ASSAY OF RENIN: CPT

## 2018-07-19 PROCEDURE — 84133 ASSAY OF URINE POTASSIUM: CPT

## 2018-07-19 PROCEDURE — 83935 ASSAY OF URINE OSMOLALITY: CPT

## 2018-07-19 PROCEDURE — 36415 COLL VENOUS BLD VENIPUNCTURE: CPT

## 2018-07-19 PROCEDURE — 2700000000 HC OXYGEN THERAPY PER DAY

## 2018-07-19 PROCEDURE — 6370000000 HC RX 637 (ALT 250 FOR IP): Performed by: FAMILY MEDICINE

## 2018-07-19 PROCEDURE — 82962 GLUCOSE BLOOD TEST: CPT

## 2018-07-19 PROCEDURE — 84300 ASSAY OF URINE SODIUM: CPT

## 2018-07-19 PROCEDURE — 82436 ASSAY OF URINE CHLORIDE: CPT

## 2018-07-19 PROCEDURE — 85025 COMPLETE CBC W/AUTO DIFF WBC: CPT

## 2018-07-19 RX ORDER — ATORVASTATIN CALCIUM 80 MG/1
80 TABLET, FILM COATED ORAL NIGHTLY
Qty: 30 TABLET | Refills: 0 | Status: SHIPPED | OUTPATIENT
Start: 2018-07-19

## 2018-07-19 RX ORDER — NICOTINE 21 MG/24HR
1 PATCH, TRANSDERMAL 24 HOURS TRANSDERMAL DAILY
Qty: 30 PATCH | Refills: 0 | Status: SHIPPED | OUTPATIENT
Start: 2018-07-20 | End: 2019-01-01 | Stop reason: ALTCHOICE

## 2018-07-19 RX ORDER — FUROSEMIDE 20 MG/1
20 TABLET ORAL DAILY
Qty: 30 TABLET | Refills: 0 | Status: ON HOLD | OUTPATIENT
Start: 2018-07-19 | End: 2018-01-01 | Stop reason: HOSPADM

## 2018-07-19 RX ORDER — DILTIAZEM HYDROCHLORIDE 180 MG/1
180 CAPSULE, COATED, EXTENDED RELEASE ORAL DAILY
Status: DISCONTINUED | OUTPATIENT
Start: 2018-07-19 | End: 2018-07-20 | Stop reason: HOSPADM

## 2018-07-19 RX ORDER — AMLODIPINE BESYLATE 5 MG/1
5 TABLET ORAL DAILY
Status: DISCONTINUED | OUTPATIENT
Start: 2018-07-19 | End: 2018-07-20 | Stop reason: HOSPADM

## 2018-07-19 RX ORDER — FUROSEMIDE 20 MG/1
20 TABLET ORAL DAILY
Status: DISCONTINUED | OUTPATIENT
Start: 2018-07-19 | End: 2018-07-20 | Stop reason: HOSPADM

## 2018-07-19 RX ORDER — DILTIAZEM HYDROCHLORIDE 180 MG/1
180 CAPSULE, COATED, EXTENDED RELEASE ORAL DAILY
Qty: 30 CAPSULE | Refills: 0 | Status: SHIPPED | OUTPATIENT
Start: 2018-07-20

## 2018-07-19 RX ORDER — SULFAMETHOXAZOLE AND TRIMETHOPRIM 800; 160 MG/1; MG/1
1 TABLET ORAL EVERY 12 HOURS SCHEDULED
Status: DISCONTINUED | OUTPATIENT
Start: 2018-07-19 | End: 2018-07-19

## 2018-07-19 RX ADMIN — LINAGLIPTIN 5 MG: 5 TABLET, FILM COATED ORAL at 09:24

## 2018-07-19 RX ADMIN — METOPROLOL TARTRATE 25 MG: 25 TABLET ORAL at 09:18

## 2018-07-19 RX ADMIN — PANTOPRAZOLE SODIUM 40 MG: 40 TABLET, DELAYED RELEASE ORAL at 09:26

## 2018-07-19 RX ADMIN — GABAPENTIN 100 MG: 100 CAPSULE ORAL at 14:30

## 2018-07-19 RX ADMIN — DILTIAZEM HYDROCHLORIDE 180 MG: 180 CAPSULE, COATED, EXTENDED RELEASE ORAL at 11:22

## 2018-07-19 RX ADMIN — AMLODIPINE BESYLATE 5 MG: 5 TABLET ORAL at 17:19

## 2018-07-19 RX ADMIN — LEVOTHYROXINE SODIUM 137 MCG: 112 TABLET ORAL at 06:49

## 2018-07-19 RX ADMIN — CALCIUM CARBONATE-VITAMIN D TAB 500 MG-200 UNIT 1 TABLET: 500-200 TAB at 20:45

## 2018-07-19 RX ADMIN — OXYCODONE HYDROCHLORIDE 15 MG: 5 TABLET ORAL at 00:55

## 2018-07-19 RX ADMIN — FUROSEMIDE 20 MG: 20 TABLET ORAL at 17:19

## 2018-07-19 RX ADMIN — CLONAZEPAM 0.5 MG: 0.5 TABLET ORAL at 09:24

## 2018-07-19 RX ADMIN — CLOPIDOGREL 75 MG: 75 TABLET, FILM COATED ORAL at 09:18

## 2018-07-19 RX ADMIN — CETIRIZINE HYDROCHLORIDE 5 MG: 10 TABLET, FILM COATED ORAL at 09:23

## 2018-07-19 RX ADMIN — GABAPENTIN 100 MG: 100 CAPSULE ORAL at 09:22

## 2018-07-19 RX ADMIN — WATER 1 G: 1 INJECTION INTRAMUSCULAR; INTRAVENOUS; SUBCUTANEOUS at 00:56

## 2018-07-19 RX ADMIN — CALCIUM CARBONATE-VITAMIN D TAB 500 MG-200 UNIT 1 TABLET: 500-200 TAB at 09:26

## 2018-07-19 RX ADMIN — CITALOPRAM 20 MG: 20 TABLET, FILM COATED ORAL at 20:45

## 2018-07-19 RX ADMIN — CLONAZEPAM 0.5 MG: 0.5 TABLET ORAL at 20:46

## 2018-07-19 RX ADMIN — OXYCODONE HYDROCHLORIDE 15 MG: 5 TABLET ORAL at 20:45

## 2018-07-19 RX ADMIN — ASPIRIN 325 MG: 325 TABLET ORAL at 09:19

## 2018-07-19 RX ADMIN — FENOFIBRATE 160 MG: 160 TABLET ORAL at 09:23

## 2018-07-19 RX ADMIN — IPRATROPIUM BROMIDE AND ALBUTEROL SULFATE 1 AMPULE: 2.5; .5 SOLUTION RESPIRATORY (INHALATION) at 09:41

## 2018-07-19 RX ADMIN — IPRATROPIUM BROMIDE AND ALBUTEROL SULFATE 1 AMPULE: 2.5; .5 SOLUTION RESPIRATORY (INHALATION) at 16:45

## 2018-07-19 RX ADMIN — ATORVASTATIN CALCIUM 80 MG: 40 TABLET, FILM COATED ORAL at 20:46

## 2018-07-19 RX ADMIN — GABAPENTIN 100 MG: 100 CAPSULE ORAL at 20:45

## 2018-07-19 RX ADMIN — Medication 10 ML: at 09:18

## 2018-07-19 ASSESSMENT — PAIN DESCRIPTION - FREQUENCY: FREQUENCY: INTERMITTENT

## 2018-07-19 ASSESSMENT — PAIN DESCRIPTION - ONSET: ONSET: ON-GOING

## 2018-07-19 ASSESSMENT — PAIN SCALES - GENERAL
PAINLEVEL_OUTOF10: 7
PAINLEVEL_OUTOF10: 8
PAINLEVEL_OUTOF10: 7

## 2018-07-19 ASSESSMENT — PAIN DESCRIPTION - DESCRIPTORS: DESCRIPTORS: ACHING;DISCOMFORT

## 2018-07-19 ASSESSMENT — PAIN DESCRIPTION - ORIENTATION: ORIENTATION: MID

## 2018-07-19 ASSESSMENT — PAIN DESCRIPTION - PROGRESSION: CLINICAL_PROGRESSION: NOT CHANGED

## 2018-07-19 ASSESSMENT — PAIN DESCRIPTION - PAIN TYPE: TYPE: CHRONIC PAIN

## 2018-07-19 ASSESSMENT — PAIN DESCRIPTION - LOCATION: LOCATION: BACK

## 2018-07-19 NOTE — PROGRESS NOTES
COMPLETE NUMBER OF IMAGES:  62 views INDICATION:  RENAL FAILURE, ACUTE (KIDNEY INJURY) COMPARISON: CT scan 2017 The right kidney measures 8 x 2.2 x 4.3 cm The left kidney  measures 11.5 x 5.3 x 5.6 and There is a hypoechoic nodule superior to the left kidney when compared with the previous CT scan I suspect represents an accessory spleen There is no hydronephrosis identified There is no calculus identified The bladder is unremarkable     Right renal atrophy     Us Carotid Artery Bilateral    Result Date: 7/15/2018  Patient MRN:  [id number] : 1945 Age: 67 years Gender: Female Order Date:  7/15/2018 7:00 AM EXAM: US CAROTID ARTERY BILATERAL NUMBER OF IMAGES:  70 INDICATION: Stroke COMPARISON: None FINDINGS: Velocities are left are mildly elevated ICA\CCA ratios are on the left are mildly elevated  The right vertebral artery doppler images demonstrate  antegrade flow. The left vertebral artery doppler images demonstrate elevated velocity in the left vertebral artery Grey scale images demonstrate moderate plaque identified in the right and left carotid arteries. The right proximal internal carotid artery is noted to be occluded     Atherosclerotic disease. No hemodynamically significant stenosis is identified Estimated stenosis by NASCET criteria in the proximal right carotid artery is occluded Estimated stenosis by NASCET criteria in the proximal left carotid artery is between 50% to 69%. Elevated velocity in the left vertebral artery suggesting stenosis, correlation with MR angiogram or CT angiogram may be useful       Assessment  1. COPD with AECOPD  2. Smoker  3. Hx of COPD  4. CVA new     Plan    1. Home nebulizer may be of help  to help with this  2. Continue with bronchodialtors  3. AVAPS QHS  4. Stop smoking  5. Can stop antibiotics in my opiniton  6. No narcotics or benzo  7. Rehabilitation needed? - she refuses  8.  Will see as needed Will be away if need Dr. Mandy Blizzard is around          Nick Salazar DO, MPH, FCCP, [place], BERNADETTE [facility],

## 2018-07-19 NOTE — PROGRESS NOTES
Pulse ox was 90% % on room air at rest.  Ambulated patient on room air. Oxygen saturation was 82% % on room air while ambulating. Oxygen applied  Recovery pulse ox was 96 % on 2 liters of oxygen while ambulating.

## 2018-07-19 NOTE — PROGRESS NOTES
Patient had a witnessed fall at her bedside. Nely Parmar from standing position to a sitting position onto floor. Did not bump head. Denies pain, discomfort or any other signs and symptoms. Perfect serve text sent to Dr Feliz Lowery.

## 2018-07-19 NOTE — PROGRESS NOTES
Department of Internal Medicine  Nephrology Progress Note        SUBJECTIVE:  Patient seen and examined bedside, she reports no new complaints today. States that her weight has increased by about 8 pounds in the admission from her baseline of 150 pounds  O2 requirement is at 4 L via nasal cannula.  She denies any shortness of breath or chest pain    Blood pressure trend is up    Physical Exam:    VITALS:  BP (!) 140/67   Pulse 75   Temp 98 °F (36.7 °C) (Temporal)   Resp 16   Ht 5' (1.524 m)   Wt 158 lb 3.2 oz (71.8 kg)   SpO2 97%   BMI 30.90 kg/m²   General: Alert, awake, oriented ×2, very drowsy, appears in no acute respiratory distress, on O2 via nasal cannula at 4 L via nasal cannula   Skin: no rash, turgor wnl  Heent:  eomi, mmm  Neck: no bruits or jvd noted  Cardiovascular:  S1, S2 without m/r/g  Respiratory: CTA B without w/r/r  Abdomen:  +bs, soft, nt, nd  Ext: No lower extremity edema  Psychiatric: mood and affect appropriate  Musculoskeletal:  Rom, muscular strength intact      Current Medications:    diltiazem (CARDIZEM CD) extended release capsule 180 mg Daily   hydrALAZINE (APRESOLINE) injection 10 mg Q6H PRN   atorvastatin (LIPITOR) tablet 80 mg Nightly   albuterol sulfate  (90 Base) MCG/ACT inhaler 2 puff Q6H PRN   insulin lispro (HUMALOG) injection vial 0-10 Units 4x Daily AC & HS   nicotine (NICODERM CQ) 14 MG/24HR 1 patch Daily   ipratropium-albuterol (DUONEB) nebulizer solution 1 ampule 4x daily   aspirin tablet 325 mg Daily   calcium-vitamin D (OSCAL-500) 500-200 MG-UNIT per tablet 1 tablet BID   citalopram (CELEXA) tablet 20 mg Nightly   clonazePAM (KLONOPIN) tablet 0.5 mg BID   clopidogrel (PLAVIX) tablet 75 mg Daily   fenofibrate tablet 160 mg Daily   gabapentin (NEURONTIN) capsule 100 mg TID   levothyroxine (SYNTHROID) tablet 137 mcg Daily   cetirizine (ZYRTEC) tablet 5 mg Daily   metoprolol tartrate (LOPRESSOR) tablet 25 mg Daily   oxyCODONE (ROXICODONE) immediate release tablet daily  6.  Okay to discharge from renal standpoint, follow-up in the office in 2-4 weeks        Thank you Dr. Kenia Flores for allowing us to participate in care of Beverly Torres       Electronically signed by Tuyet Alexis MD on 7/19/2018 at 2:55 PM

## 2018-07-20 VITALS
OXYGEN SATURATION: 97 % | DIASTOLIC BLOOD PRESSURE: 59 MMHG | HEIGHT: 60 IN | BODY MASS INDEX: 32.96 KG/M2 | RESPIRATION RATE: 18 BRPM | SYSTOLIC BLOOD PRESSURE: 149 MMHG | WEIGHT: 167.9 LBS | TEMPERATURE: 97.4 F | HEART RATE: 72 BPM

## 2018-07-20 LAB
ANION GAP SERPL CALCULATED.3IONS-SCNC: 7 MMOL/L (ref 7–16)
BASOPHILS ABSOLUTE: 0.03 E9/L (ref 0–0.2)
BASOPHILS RELATIVE PERCENT: 0.7 % (ref 0–2)
BUN BLDV-MCNC: 45 MG/DL (ref 8–23)
CALCIUM SERPL-MCNC: 9.2 MG/DL (ref 8.6–10.2)
CHLORIDE BLD-SCNC: 97 MMOL/L (ref 98–107)
CO2: 34 MMOL/L (ref 22–29)
CREAT SERPL-MCNC: 1.5 MG/DL (ref 0.5–1)
EOSINOPHILS ABSOLUTE: 0.21 E9/L (ref 0.05–0.5)
EOSINOPHILS RELATIVE PERCENT: 4.7 % (ref 0–6)
GFR AFRICAN AMERICAN: 41
GFR NON-AFRICAN AMERICAN: 34 ML/MIN/1.73
GLUCOSE BLD-MCNC: 99 MG/DL (ref 74–109)
HCT VFR BLD CALC: 37.1 % (ref 34–48)
HEMOGLOBIN: 12.3 G/DL (ref 11.5–15.5)
IMMATURE GRANULOCYTES #: 0.13 E9/L
IMMATURE GRANULOCYTES %: 2.9 % (ref 0–5)
LYMPHOCYTES ABSOLUTE: 0.72 E9/L (ref 1.5–4)
LYMPHOCYTES RELATIVE PERCENT: 16 % (ref 20–42)
MCH RBC QN AUTO: 30.8 PG (ref 26–35)
MCHC RBC AUTO-ENTMCNC: 33.2 % (ref 32–34.5)
MCV RBC AUTO: 93 FL (ref 80–99.9)
METER GLUCOSE: 105 MG/DL (ref 70–110)
MONOCYTES ABSOLUTE: 0.47 E9/L (ref 0.1–0.95)
MONOCYTES RELATIVE PERCENT: 10.4 % (ref 2–12)
NEUTROPHILS ABSOLUTE: 2.95 E9/L (ref 1.8–7.3)
NEUTROPHILS RELATIVE PERCENT: 65.3 % (ref 43–80)
PDW BLD-RTO: 14.4 FL (ref 11.5–15)
PLATELET # BLD: 134 E9/L (ref 130–450)
PMV BLD AUTO: 10.2 FL (ref 7–12)
POTASSIUM SERPL-SCNC: 4.5 MMOL/L (ref 3.5–5)
RBC # BLD: 3.99 E12/L (ref 3.5–5.5)
SODIUM BLD-SCNC: 138 MMOL/L (ref 132–146)
WBC # BLD: 4.5 E9/L (ref 4.5–11.5)

## 2018-07-20 PROCEDURE — 6370000000 HC RX 637 (ALT 250 FOR IP): Performed by: INTERNAL MEDICINE

## 2018-07-20 PROCEDURE — 2580000003 HC RX 258: Performed by: FAMILY MEDICINE

## 2018-07-20 PROCEDURE — 6370000000 HC RX 637 (ALT 250 FOR IP): Performed by: FAMILY MEDICINE

## 2018-07-20 PROCEDURE — 2700000000 HC OXYGEN THERAPY PER DAY

## 2018-07-20 PROCEDURE — 80048 BASIC METABOLIC PNL TOTAL CA: CPT

## 2018-07-20 PROCEDURE — 94660 CPAP INITIATION&MGMT: CPT

## 2018-07-20 PROCEDURE — 85025 COMPLETE CBC W/AUTO DIFF WBC: CPT

## 2018-07-20 PROCEDURE — 36415 COLL VENOUS BLD VENIPUNCTURE: CPT

## 2018-07-20 PROCEDURE — 82962 GLUCOSE BLOOD TEST: CPT

## 2018-07-20 PROCEDURE — 94640 AIRWAY INHALATION TREATMENT: CPT

## 2018-07-20 RX ADMIN — CETIRIZINE HYDROCHLORIDE 5 MG: 10 TABLET, FILM COATED ORAL at 10:04

## 2018-07-20 RX ADMIN — CALCIUM CARBONATE-VITAMIN D TAB 500 MG-200 UNIT 1 TABLET: 500-200 TAB at 10:04

## 2018-07-20 RX ADMIN — LINAGLIPTIN 5 MG: 5 TABLET, FILM COATED ORAL at 10:03

## 2018-07-20 RX ADMIN — ASPIRIN 325 MG: 325 TABLET ORAL at 10:03

## 2018-07-20 RX ADMIN — DILTIAZEM HYDROCHLORIDE 180 MG: 180 CAPSULE, COATED, EXTENDED RELEASE ORAL at 10:04

## 2018-07-20 RX ADMIN — GABAPENTIN 100 MG: 100 CAPSULE ORAL at 10:04

## 2018-07-20 RX ADMIN — LEVOTHYROXINE SODIUM 137 MCG: 112 TABLET ORAL at 06:32

## 2018-07-20 RX ADMIN — Medication 10 ML: at 10:04

## 2018-07-20 RX ADMIN — PANTOPRAZOLE SODIUM 40 MG: 40 TABLET, DELAYED RELEASE ORAL at 10:03

## 2018-07-20 RX ADMIN — CLONAZEPAM 0.5 MG: 0.5 TABLET ORAL at 10:03

## 2018-07-20 RX ADMIN — FUROSEMIDE 20 MG: 20 TABLET ORAL at 10:04

## 2018-07-20 RX ADMIN — METOPROLOL TARTRATE 25 MG: 25 TABLET ORAL at 10:03

## 2018-07-20 RX ADMIN — IPRATROPIUM BROMIDE AND ALBUTEROL SULFATE 1 AMPULE: 2.5; .5 SOLUTION RESPIRATORY (INHALATION) at 10:04

## 2018-07-20 RX ADMIN — FENOFIBRATE 160 MG: 160 TABLET ORAL at 10:03

## 2018-07-20 RX ADMIN — AMLODIPINE BESYLATE 5 MG: 5 TABLET ORAL at 10:03

## 2018-07-20 RX ADMIN — CLOPIDOGREL 75 MG: 75 TABLET, FILM COATED ORAL at 10:03

## 2018-07-20 NOTE — DISCHARGE SUMMARY
Hospital Medicine Discharge Summary    Patient ID: Britney Nelson      Patient's PCP: Xu Alva DO    Admit Date: 7/14/2018     Discharge Date:  7/20/2018      Admitting Physician: Mariposa Hatfield MD     Discharge Physician: Steffi Tate DO     Discharge Diagnoses: Active Hospital Problems    Diagnosis Date Noted    [facility]vascular [facility] [I63.9] 07/15/2018     Priority: High    Acute respiratory failure with hypercapnia (HCC) [J96.02] 07/15/2018     Priority: High    Acute kidney injury superimposed on chronic kidney disease ([facility] [N17.9, N18.9] 07/14/2018     Priority: Medium    Type 2 diabetes mellitus with renal complication ([facility] [Y36.93] 07/14/2018     Priority: Medium    HTN (hypertension) [I10] 07/14/2018     Priority: Medium    HLD (hyperlipidemia) [E78.5] 07/14/2018     Priority: Medium    COPD (chronic obstructive pulmonary disease) ([facility] [J44.9] 07/14/2018     Priority: Medium    Tobacco dependence [F17.200] 07/14/2018     Priority: Medium    UTI (urinary tract infection) [N39.0] 07/14/2018     Priority: Low    CAD (coronary artery disease) [I25.10] 07/14/2018     Priority: Low    Asymptomatic stenosis of left carotid artery [I65.22]     Left arm weakness [R29.898] 07/14/2018    Stroke-like symptoms [R29.90] 07/14/2018    Hypothyroidism [E03.9] 07/14/2018    Hernia of anterior abdominal wall [K43.9] 07/14/2018    History of recent fall [Z91.81] 07/14/2018    Soft tissue injury [T14.90XA] 07/14/2018       The patient was seen and examined on day of discharge and this discharge summary is in conjunction with any daily progress note from day of discharge. Admission HPI: 67 y.o. female who presented to [facility] with past medical history of diabetes, hypertension, hyperlipidemia, COPD, coronary artery disease status post stents and CABG, hypothyroidism and ventral hernia. Patient fell a week ago at home while climbing stairs.  She denied any Pulmonology consulted regarding acute respiratory failure. They felt that she had a COPD exacerbation. Given a one-time dose of steroids. Vascular surgery was consulted regarding her right ICA occlusion. They did not recommend any acute intervention. An MRI was performed of the brain. This showed \"Multifocal acute subacute MCA distribution infarcts without findings of hemorrhagic transformation. \" MRI of the cervical spine was also performed. This showed, \"Multilevel degenerative disc disease extending from C3 through C7.\" With time, the patient improved to the point on 7/19/2018 the patient was deemed stable to be discharged to home. There was a complication as the patient was unable to obtain home oxygen until 7/20/2018. At that time, she was able to be discharged home. She was recommended to follow-up with her PCP within one week and with specialists as directed.       Consults:     IP CONSULT TO HOSPITALIST  IP CONSULT TO NEUROLOGY  IP CONSULT TO VASCULAR SURGERY  IP CONSULT TO PULMONOLOGY  IP CONSULT TO NEPHROLOGY  IP CONSULT TO CASE MANAGEMENT  IP CONSULT TO HOME CARE NEEDS    Significant Diagnostic Studies:  As above      Discharge Instructions/Follow-up:  As above       Activity: activity as tolerated    Physical Exam:  Vitals:    07/20/18 1014   BP:    Pulse:    Resp: 18   Temp:    SpO2:        General appearance: No apparent distress, appears stated age and cooperative. HEENT: Conjunctivae/corneas clear. Pupils equal and round. No injections noted. Neck: Supple. No lesions, scars or masses. Trachea midline. Respiratory:  Normal respiratory effort. Diminished air movement with few scattered Wheezes/Rhonchi. Cardiovascular: Regular rate and rhythm with normal S1/S2 without murmurs, rubs or gallops. Abdomen: Soft, non-tender, non-distended with normal bowel sounds. Musculoskeletal: No clubbing, cyanosis or edema bilaterally. Brisk capillary refill. 2+ lower extremity pulses (dorsalis pedis).    Skin:  No Atherosclerotic disease. No hemodynamically significant stenosis is   identified   Estimated stenosis by NASCET criteria in the proximal right carotid   artery is occluded   Estimated stenosis by NASCET criteria in the proximal left carotid   artery is between 50% to 69%. Elevated velocity in the left vertebral artery suggesting stenosis,   correlation with MR angiogram or CT angiogram may be useful         CT CERVICAL SPINE WO CONTRAST   Final Result     No CT evidence of acute C-spine injury. Cervical spondylosis/degenerative    disc disease as detailed above. Areas of minimal spondylolisthesis most likely    degenerative as discussed above. For persistent or progressive symptoms, could further investigate with MRI. This report has been electronically signed by Rosita Loja MD.      XR CHEST STANDARD (2 VW)   Final Result   Tortuous ectatic aorta                     CT Head WO Contrast   Final Result   No acute intracranial hemorrhage.             Discharge Medications:     Discharge Medication List as of 7/20/2018 12:44 PM           Details   nicotine (NICODERM CQ) 14 MG/24HR Place 1 patch onto the skin daily, Disp-30 patch, R-0Normal      albuterol (PROVENTIL) (5 MG/ML) 0.5% nebulizer solution Take 1 mL by nebulization 4 times daily as needed for Wheezing, Disp-120 each, R-0Normal      ipratropium (ATROVENT) 0.02 % nebulizer solution Take 2.5 mLs by nebulization 4 times daily as needed for Wheezing, Disp-300 mL, R-0Normal              Details   atorvastatin (LIPITOR) 80 MG tablet Take 1 tablet by mouth nightly, Disp-30 tablet, R-0Normal      diltiazem (CARDIZEM CD) 180 MG extended release capsule Take 1 capsule by mouth daily, Disp-30 capsule, R-0Normal      furosemide (LASIX) 20 MG tablet Take 1 tablet by mouth daily, Disp-30 tablet, R-0Normal              Details   metoprolol tartrate (LOPRESSOR) 25 MG tablet Take 25 mg by mouth dailyHistorical Med      loratadine (CLARITIN) 10 MG tablet Take 10

## 2018-07-20 NOTE — PROGRESS NOTES
hours. No results for input(s): Rachel Nap in the last 72 hours. Imaging:  MRI Cervical Spine WO Contrast   Final Result   ALERT:  THIS IS AN ABNORMAL REPORT   1. Multilevel degenerative disc disease extending from C3 through C7. Moderately severe spinal canal stenosis thought to be present at   C5-C6. Other aspects of the exam is severely limited by motion   artifact. There is questionable abnormal signal intensity extending   from C4-C5 level which could be reflective of underlying degree of   myelomalacia changes versus possible artifact. 2. See above for details. 3. Suspected cerebral volume loss/atrophy incompletely visualized on   limited visualization of the brain. MRI Brain WO Contrast   Final Result   Multifocal acute subacute MCA distribution infarcts without findings   of hemorrhagic transformation. Mild-to-moderate chronic small ischemic disease with evidence of   multifocal areas of gliosis right  cerebral hemisphere related to   remote infarcts. ALERT:  THIS IS AN ABNORMAL REPORT      US DUP LOWER EXTREMITIES BILATERAL VENOUS   Final Result   Patent deep venous system of both lower extremities. No   evidence for DVT. NM LUNG VENT/PERFUSION (VQ)   Final Result   This study is considered to be of moderate to low   probability for pulmonary emboli as commented. US RETROPERITONEAL COMPLETE   Final Result   Right renal atrophy         US CAROTID ARTERY BILATERAL   Final Result   Atherosclerotic disease. No hemodynamically significant stenosis is   identified   Estimated stenosis by NASCET criteria in the proximal right carotid   artery is occluded   Estimated stenosis by NASCET criteria in the proximal left carotid   artery is between 50% to 69%.    Elevated velocity in the left vertebral artery suggesting stenosis,   correlation with MR angiogram or CT angiogram may be useful         CT CERVICAL SPINE WO CONTRAST   Final Result     No CT evidence of acute C-spine injury. Cervical spondylosis/degenerative    disc disease as detailed above. Areas of minimal spondylolisthesis most likely    degenerative as discussed above. For persistent or progressive symptoms, could further investigate with MRI. This report has been electronically signed by Zhanna Mcclendon MD.      XR CHEST STANDARD (2 VW)   Final Result   Tortuous ectatic aorta                     CT Head WO Contrast   Final Result   No acute intracranial hemorrhage. Assessment/Plan:  Active Hospital Problems    Diagnosis Date Noted    Asymptomatic stenosis of left carotid artery [I65.22]     Acute CVA (cerebrovascular accident) ([facility] [I63.9] 07/15/2018    Acute respiratory failure with hypercapnia (HCC) [J96.02] 07/15/2018    Left arm weakness [R29.898] 07/14/2018    Stroke-like symptoms [R29.90] 07/14/2018    Acute kidney injury superimposed on chronic kidney disease ([address] [N17.9, N18.9] 07/14/2018    UTI (urinary tract infection) [N39.0] 07/14/2018    Type 2 diabetes mellitus with renal complication ([facility] [address] [W55.96] 07/14/2018    HTN (hypertension) [I10] 07/14/2018    HLD (hyperlipidemia) [E78.5] 07/14/2018    COPD (chronic obstructive pulmonary disease) ([facility] [address] [J44.9] 07/14/2018    CAD (coronary artery disease) [I25.10] 07/14/2018    Hypothyroidism [E03.9] 07/14/2018    Tobacco dependence [F17.200] 07/14/2018    Hernia of anterior abdominal wall [K43.9] 07/14/2018    History of recent fall [Z91.81] 07/14/2018    Soft tissue injury [T14.90XA] 07/14/2018       Plan:  · AVAPS at night  · Pulmonology on board  · Neurology recommends continuing DAPT  · US of BL LE negative  · Discontinue abx           Body mass index is 32.79 kg/m². · DVT Prophylaxis  · SCDs    · Diet  DIET CARB CONTROL;    · Code Status  Prior    · PT/OT Eval Status  · Consulted     · Disposition  · Patient wants to go home. Waiting for home [address] D[address]   [employer] - Chief

## 2018-07-22 LAB — ALDOSTERONE: 14.2 NG/DL

## 2018-08-02 LAB
EKG ATRIAL RATE: 69 BPM
EKG P AXIS: 49 DEGREES
EKG P-R INTERVAL: 158 MS
EKG Q-T INTERVAL: 380 MS
EKG QRS DURATION: 92 MS
EKG QTC CALCULATION (BAZETT): 407 MS
EKG R AXIS: 39 DEGREES
EKG T AXIS: 154 DEGREES
EKG VENTRICULAR RATE: 69 BPM

## 2018-08-19 PROBLEM — N39.0 UTI (URINARY TRACT INFECTION): Status: RESOLVED | Noted: 2018-07-14 | Resolved: 2018-08-19

## 2018-09-07 ENCOUNTER — OFFICE VISIT (OUTPATIENT)
Dept: PULMONOLOGY | Age: 73
End: 2018-09-07
Payer: MEDICARE

## 2018-09-07 VITALS
OXYGEN SATURATION: 92 % | RESPIRATION RATE: 16 BRPM | WEIGHT: 151 LBS | TEMPERATURE: 97.8 F | HEART RATE: 62 BPM | HEIGHT: 60 IN | BODY MASS INDEX: 29.64 KG/M2 | DIASTOLIC BLOOD PRESSURE: 83 MMHG | SYSTOLIC BLOOD PRESSURE: 183 MMHG

## 2018-09-07 DIAGNOSIS — J96.02 ACUTE RESPIRATORY FAILURE WITH HYPERCAPNIA (HCC): ICD-10-CM

## 2018-09-07 DIAGNOSIS — J44.1 CHRONIC OBSTRUCTIVE PULMONARY DISEASE WITH ACUTE EXACERBATION (HCC): ICD-10-CM

## 2018-09-07 PROCEDURE — 99203 OFFICE O/P NEW LOW 30 MIN: CPT | Performed by: INTERNAL MEDICINE

## 2018-09-07 PROCEDURE — 99214 OFFICE O/P EST MOD 30 MIN: CPT | Performed by: INTERNAL MEDICINE

## 2018-09-07 RX ORDER — PREDNISONE 10 MG/1
TABLET ORAL
Qty: 10 TABLET | Refills: 0 | Status: SHIPPED | OUTPATIENT
Start: 2018-09-07 | End: 2019-01-01 | Stop reason: SDUPTHER

## 2018-09-07 RX ORDER — AZITHROMYCIN 250 MG/1
250 TABLET, FILM COATED ORAL DAILY
Qty: 7 TABLET | Refills: 0 | Status: SHIPPED | OUTPATIENT
Start: 2018-09-07 | End: 2019-01-01 | Stop reason: SDUPTHER

## 2018-09-07 RX ORDER — FUROSEMIDE 20 MG/1
TABLET ORAL
Refills: 1 | Status: ON HOLD | COMMUNITY
Start: 2018-07-31 | End: 2019-01-01 | Stop reason: SDUPTHER

## 2018-09-07 RX ORDER — LANOLIN ALCOHOL/MO/W.PET/CERES
CREAM (GRAM) TOPICAL
Refills: 4 | Status: ON HOLD | COMMUNITY
Start: 2018-08-17 | End: 2019-01-01 | Stop reason: HOSPADM

## 2018-09-07 NOTE — PROGRESS NOTES
 zolpidem (AMBIEN) 10 MG tablet Take 10 mg by mouth nightly as needed for Sleep      insulin glargine (LANTUS) 100 UNIT/ML injection Inject 12 Units into the skin nightly. No current facility-administered medications for this visit. SOCIAL AND OCCUPATIONAL HEALTH:  History   Smoking Status    Current Every Day Smoker    Packs/day: 1.00    Years: 55.00   Smokeless Tobacco    Former User     TB :No   Pets cat and dog   Industrial exposure:  Birds :no     SURGICAL HISTORY:   Past Surgical History:   Procedure Laterality Date    CARDIAC SURGERY      CHOLECYSTECTOMY      COLONOSCOPY      CORONARY ARTERY BYPASS GRAFT N/A May 22, 2013    ENDOSCOPY, COLON, DIAGNOSTIC      HYSTERECTOMY      JOINT REPLACEMENT      bilateral knees       FAMILY HISTORY:   Lung cancer:No   DVT or PE ,no has stroke     REVIEW OF SYSTEMS:  Constitutional: General health is good . There has been no weight changes. No fevers, fatigue or weakness. Head: Patient denies any history of trauma, convulsive disorder or syncope. Skin:  Patient denies history of changes in pigmentation, eruptions or pruritus. No easy bruising or bleeding. EENT: no nasal congestion   Cardiovascular ,No chest pain ,No edema ,  Respiration:SOB:+  ,REYES :++  Gastrointestinal:No GI bleed ,no melena  ,no hematemesis    Musculoskeletal: no joint pain ,no swelling  Neurological:no , syncope. Denies twitching, convulsions, loss of consciousness or memory. Endocrine:  . No history of goiter, exophthalmos or dryness of skin. The patient has no history of diabetes. Hematopoietic:  No history of bleeding disorders or easy bruising. Rheumatic:  No connective tissue disease history or polyarthritis/inflammatory joint disease.       PHYSICAL EXAMINATION:  Vitals:    09/07/18 1047   BP: (!) 183/83   Pulse: 62   Resp: 16   Temp: 97.8 °F (36.6 °C)   SpO2: 92%     Constitutional: This is a well developed, well nourished 67y.o. year old female who is stability    Consult about her smoking she continues she will have the risk of cancer and also respiratory failure  Flu and Pneumovax     Thank you for allowing me to participate in [facility]. I will keep following with you ,should you have any concerns ,please contact me at [phone]     Sincerely,        Justine Kingsley MD  Pulmonary & Critical Care Medicine     NOTE: This report was transcribed using voice recognition software. Every effort was made to ensure accuracy; however, inadvertent computerized transcription errors may be present.

## 2018-09-12 ENCOUNTER — TELEPHONE (OUTPATIENT)
Dept: PULMONOLOGY | Age: 73
End: 2018-09-12

## 2018-12-15 PROBLEM — J96.22 ACUTE ON CHRONIC RESPIRATORY FAILURE WITH HYPOXIA AND HYPERCAPNIA (HCC): Status: ACTIVE | Noted: 2018-01-01

## 2018-12-15 PROBLEM — J96.90 RESPIRATORY FAILURE (HCC): Status: ACTIVE | Noted: 2018-01-01

## 2018-12-15 PROBLEM — J96.21 ACUTE ON CHRONIC RESPIRATORY FAILURE WITH HYPOXIA AND HYPERCAPNIA (HCC): Status: ACTIVE | Noted: 2018-01-01

## 2018-12-16 NOTE — ED NOTES
Bed: 01  Expected date:   Expected time:   Means of arrival:   Comments:  Jimmy-73F JC Tripp RN  12/15/18 1910

## 2018-12-16 NOTE — H&P
MOUTH ONCE DAILY  melatonin 3 MG TABS tablet, TAKE 1 TABLET BY MOUTH AT BEDTIME  atorvastatin (LIPITOR) 80 MG tablet, Take 1 tablet by mouth nightly  diltiazem (CARDIZEM CD) 180 MG extended release capsule, Take 1 capsule by mouth daily  furosemide (LASIX) 20 MG tablet, Take 1 tablet by mouth daily  nicotine (NICODERM CQ) 14 MG/24HR, Place 1 patch onto the skin daily  albuterol (PROVENTIL) (5 MG/ML) 0.5% nebulizer solution, Take 1 mL by nebulization 4 times daily as needed for Wheezing  ipratropium (ATROVENT) 0.02 % nebulizer solution, Take 2.5 mLs by nebulization 4 times daily as needed for Wheezing  metoprolol tartrate (LOPRESSOR) 25 MG tablet, Take 25 mg by mouth daily  loratadine (CLARITIN) 10 MG tablet, Take 10 mg by mouth daily  clonazePAM (KLONOPIN) 0.5 MG tablet, Take 0.5 mg by mouth 2 times daily  insulin aspart (NOVOLOG) 100 UNIT/ML injection vial, Inject 1 Units into the skin 4 times daily Sliding scale BS , no units. 131 - 180= 2 units. 181 - 240= 4 units. 241 - 300=6units. 301-350=8units. 351-400 = 10 units. Anything over 400 =12 units and call doctor  clopidogrel (PLAVIX) 75 MG tablet, Take 1 tablet by mouth daily  gabapentin (NEURONTIN) 100 MG capsule, Take 1 capsule by mouth 3 times daily  levothyroxine (SYNTHROID) 137 MCG tablet, Take 137 mcg by mouth Daily  Cholecalciferol (VITAMIN D3) 18628 UNITS CAPS, Take by mouth every 30 days  sitaGLIPtin (JANUVIA) 50 MG tablet, Take 50 mg by mouth daily  traMADol (ULTRAM) 50 MG tablet, Take 50 mg by mouth 2 times daily as needed for Pain   fenofibrate (TRICOR) 145 MG tablet, Take 145 mg by mouth daily  zolpidem (AMBIEN) 10 MG tablet, Take 10 mg by mouth nightly as needed for Sleep  albuterol (PROVENTIL HFA;VENTOLIN HFA) 108 (90 BASE) MCG/ACT inhaler, Inhale 1 puff into the lungs 4 times daily as needed for Wheezing   oxyCODONE (OXY-IR) 15 MG immediate release tablet, Take 15 mg by mouth 4 times daily  .   insulin glargine (LANTUS) 100 UNIT/ML (7/14/2018)    Assessment: STABLE    Plan: CONTINUE CARDIAC MEDICATIONS   Respiratory failure ([facility] [address] (12/15/2018)    Assessment: CHRONIC    Plan: AS ABOVE        INPATIENT ADMISSION. INTERNAL MEDICINE CONSULT. PROVIDE OXYGEN AND MAINTAIN PROPER SATURATION. DISCHARGE PLANS TO RETURN HOME.  ESTIMATED LENGTH OF STAY IS 3 DAYS.

## 2018-12-16 NOTE — CONSULTS
INTERNAL MEDICINE  CONSULT NOTE  12/15/2018    Physician Consulted: Shalini Modi  Reason for Consult: mm  Referring Physician: Tory Claire is a 75 y.o. female who presented with worsening shortness of breath via EMS from home. She was brought in on BiPAP into the ER and this was transitioned off to nasal cannula oxygen. She does use continuous nasal cannula oxygen at home 2 L all the time. She does not use a CPAP or BiPAP. She continues to smoke 1 pack a day of cigarettes. She states that her shortness of breath gradually got worse. She denies any chest pain, abdominal pain, fever, chills, nausea, vomiting, dysuria, diarrhea, recent antibiotic use, sick contacts. She is currently on the 6th floor and appears to be in no acute distress. She appears very tired. She is currently on 4 L nasal cannula. There is no family at the bedside. All questions were answered. A Newell catheter was placed in the ER.    ECHO 7/15/2018   Technically sub-optimal images.   No gross evidence of intracardiac mass.   Agitated saline injection showed no intracardiac shunt.   Left ventricular size is grossly normal.   Normal LV segmental wall motion, EF 65%. Mozell Dasilva left ventricular concentric hypertrophy noted.   There is doppler evidence of stage I diastolic dysfunction.   Left atrium is of normal size.   Increased LA volume index.   Interatrial septum not well visualized but appears intact.   Moderate right ventricular enlargement.   Mitral annular calcification is present.   Mitral valve was not well visualized.   No mitral valve prolapse.   Aortic valve was not well visualized.   Moderate aortic valve sclerosis.  Camilla Jury is trace to mild Aortic regurgitation, P1/2 T 538 ms.   There is trace tricuspid regurgitation, RVSP 23mmHg.   Normal aortic root size.   No evidence of pericardial effusion.   Compared to prior Echo form 8/2016.         Past Medical History:   Diagnosis Date    Arthritis     CAD (coronary artery disease)     [facility]) [facility] [facility]     Diabetes ([facility] [facility]     HTN (hypertension)     Hyperlipidemia     [facility]     Thyroid disease        Past Surgical History:   Procedure Laterality Date    CARDIAC SURGERY      CHOLECYSTECTOMY      COLONOSCOPY      CORONARY ARTERY BYPASS GRAFT N/A May 22, 2013    ENDOSCOPY, COLON, DIAGNOSTIC      HYSTERECTOMY      JOINT REPLACEMENT      bilateral knees       Medications Prior to Admission:    Prior to Admission medications    Medication Sig Start Date End Date Taking?  Authorizing Provider   Fluticasone-Umeclidin-Vilant (TRELEGY ELLIPTA) 100-62.5-25 MCG/INH AEPB Inhale 1 puff into the lungs daily 11/26/18   Amrita Narayanan MD   albuterol sulfate HFA (PROAIR HFA) 108 (90 Base) MCG/ACT inhaler Inhale 2 puffs into the lungs every 6 hours as needed for Wheezing or Shortness of Breath /// Same as Ventolin(Blue) Inhaler 11/26/18   Amrita Carl MD   furosemide (LASIX) 40 MG tablet TAKE 1 TABLET BY MOUTH ONCE DAILY 7/31/18   Historical Provider, MD   melatonin 3 MG TABS tablet TAKE 1 TABLET BY MOUTH AT BEDTIME 8/17/18   Historical Provider, MD   atorvastatin (LIPITOR) 80 MG tablet Take 1 tablet by mouth nightly 7/19/18   Lilliana Rank, DO   diltiazem (CARDIZEM CD) 180 MG extended release capsule Take 1 capsule by mouth daily 7/20/18   Lilliana Rank, DO   furosemide (LASIX) 20 MG tablet Take 1 tablet by mouth daily 7/19/18 8/18/18  Lilliana Rank, DO   nicotine (NICODERM CQ) 14 MG/24HR Place 1 patch onto the skin daily 7/20/18   Lilliana Rank, DO   albuterol (PROVENTIL) (5 MG/ML) 0.5% nebulizer solution Take 1 mL by nebulization 4 times daily as needed for Wheezing 7/19/18   Lilliana Rank, DO   ipratropium (ATROVENT) 0.02 % nebulizer solution Take 2.5 mLs by nebulization 4 times daily as needed for Wheezing 7/19/18   Lilliana Rank, DO   metoprolol tartrate (LOPRESSOR) 25 MG tablet Take 25 mg by mouth daily    Historical Provider, MD

## 2018-12-16 NOTE — PROGRESS NOTES
Internal Medicine Progress Note    Krystina Beth. Harmony Castillo., & [address] Harmony Castillo., F.A.C.O.I. Ana María Guzman D.O., F.A.C.O.I. Primary Care Physician: Sivan Tran DO   Admitting Physician:  Sivan Tran DO  Admission date and time: 12/15/2018  7:10 PM    Room:  [address] [place] [address]  Admitting diagnosis: Respiratory failure [facility]) [J96.90]    Patient Name: Derrick Camacho  MRN: [id number]    Date of Service: 12/16/2018     Subjective: [facility] is a 75 y. o.  female who was seen and examined today,12/16/2018, at the bedside.  today. Still with shortness of breath. Not quite to baseline yet. Admits to conversational dyspnea. Denies fever or chills. No family present during my examination. I reviewed the patient's past medical, surgical history and medication. I reviewed the  course of events since last visit. Review of System:  Constitutional:   Negative for fever and chills. HEENT:   Negative for ear pain, sore throat, rhinorrhea and sinus pressure. Negative for eye pain, discharge and redness. Psch:   Denies depression or anxiety. Cardiovascular:   Denies any chest pain, irregular heartbeats, or palpitations. Respiratory: For chronic shortness of breath, still was slightly more dyspnea with activity, denies coughing, sputum production, hemoptysis, or wheezing. Positive for chronic oxygen use. Gastrointestinal:   Denies nausea, vomiting, diarrhea, or constipation. Denies any abdominal pain. Extremities:   Denies any lower extremity swelling or edema. Neurology:    Denies any headache or focal neurological deficits. No weakness or paresthesia. Derm:   Denies any rashes, ulcers, or excoriations. Denies bruising. Genitourinary:    Denies any urgency, frequency, hematuria. Voiding without difficulty.   Musculoskeletal:  Negative for myalgias, back pain and arthralgias      Allergy:  No Known Allergies     Medication:  Scheduled Meds:   lesions    Wound Documentation:   Wound 08/20/16 Other (Comment) Elbow Left left elbow scratch measuring 8 cm x 0.2 cm \" caused by cat\" (Active)   Number of days: 848       Chronic Hospital Medical Problems:  Past Medical History:   Diagnosis Date    Arthritis     CAD (coronary artery disease)     [facility]) [facility] [facility]     Diabetes ([facility]     HTN (hypertension)     Hyperlipidemia     Osteomyelitis ([facility] [address] [facility]     Thyroid disease      Principal Problem:    Acute on chronic respiratory failure with hypoxia and hypercapnia (HCC)  Active Problems:    COPD (chronic obstructive pulmonary disease) (HCC)    CAD (coronary artery disease)    Respiratory failure ([facility] [address]  Resolved Problems:    * No resolved hospital problems. *      Assessment:  Acute on chronic COPD exacerbation  Chronic COPD on home oxygen 2L NC  Uncontrolled Hypertension  Hyperlipidemia  Thyroid disease  Diabetes mellitus type II insulin-dependent  Coronary artery disease  Current nicotine abuse  CKD III secondary to hypertensive nephrosclerosis  Hx acute, subacute right MCA CVA    Plan:   Since respiratory status is slightly improved today but not to baseline. Patient admits that while shopping she did run out of oxygen therapy. We'll continue to maintain patient on current oxygen level and adjust as needed maintaining oxygen saturations greater than 88%. Encourage the patient to increase her activity. We'll continue current respiratory therapies. Likely discharge in the next 24-48 hours if stable. Patient's medications were reviewed/continued/adjusted. Labs as ordered. Please see orders for further plan of care. More than 50% of my  time was spent counseling and/or coordination of care with the patient and/or family with face to face contact. Time was spent reviewing notes and laboratory data, instructing and counseling the patient  regarding my findings and recommendations and reviewing and answer questions.     Karlee Tolbert

## 2018-12-17 NOTE — PROGRESS NOTES
Physical Therapy    0622/0622-01    PHYSICAL THERAPY INITIAL EVALUATION  Tentative placement recommendation: homep.t. Equipment prescriptions needed:   none  Plan of care: Patient will be seen  daily for therapeutic exercise, functional retraining, endurance activities, balance exercises, family and patient education. Nursing to ambulate patient in hallway; order entered. AM-PAC Basic Mobility        AM-[facility] Mobility Inpatient   How much difficulty turning over in bed?: None  How much difficulty sitting down on / standing up from a chair with arms?: None  How much difficulty moving from lying on back to sitting on side of bed?: None  How much help from another person moving to and from a bed to a chair?: None  How much help from another person needed to walk in hospital room?: None  How much help from another person for climbing 3-5 steps with a railing?: A Little  AM-[facility] Inpatient Mobility Raw Score : 23  AM-PAC Inpatient T-Scale Score : 56.93  Mobility Inpatient CMS 0-100% Score: 11.2  Mobility Inpatient CMS G-Code Modifier : CI    Order: evaluate and treat  Diagnosis:    1. Acute on chronic respiratory failure with hypoxia and hypercapnia (HCC)    2. Chronic obstructive pulmonary disease with acute exacerbation (HCC)       Past medical history:       Diagnosis Date    Arthritis     CAD (coronary artery disease)     [facility]) [facility]     Diabetes ([facility]     HTN (hypertension)     Hyperlipidemia     Osteomyelitis ([facility] [address] [facility]     Thyroid disease    ;      Procedure Laterality Date    CARDIAC SURGERY      CHOLECYSTECTOMY      COLONOSCOPY      CORONARY ARTERY BYPASS GRAFT N/A May 22, 2013    ENDOSCOPY, COLON, DIAGNOSTIC      HYSTERECTOMY      JOINT REPLACEMENT      bilateral knees       The admitting diagnosis and active problem list as listed above have been reviewed prior to the initiation of this evaluation. Nursing cleared the patient for evaluation.    Patient is

## 2019-01-01 ENCOUNTER — HOSPITAL ENCOUNTER (OUTPATIENT)
Dept: WOUND CARE | Age: 74
Discharge: HOME | End: 2019-03-20
Payer: MEDICARE

## 2019-01-01 ENCOUNTER — APPOINTMENT (OUTPATIENT)
Dept: GENERAL RADIOLOGY | Age: 74
DRG: 870 | End: 2019-01-01
Payer: MEDICARE

## 2019-01-01 ENCOUNTER — TELEPHONE (OUTPATIENT)
Dept: WOUND CARE | Age: 74
End: 2019-01-01

## 2019-01-01 ENCOUNTER — APPOINTMENT (OUTPATIENT)
Dept: GENERAL RADIOLOGY | Age: 74
DRG: 871 | End: 2019-01-01
Payer: MEDICARE

## 2019-01-01 ENCOUNTER — TELEPHONE (OUTPATIENT)
Dept: VASCULAR SURGERY | Age: 74
End: 2019-01-01

## 2019-01-01 ENCOUNTER — APPOINTMENT (OUTPATIENT)
Dept: GENERAL RADIOLOGY | Age: 74
DRG: 394 | End: 2019-01-01
Payer: MEDICARE

## 2019-01-01 ENCOUNTER — ANESTHESIA (OUTPATIENT)
Dept: ICU | Age: 74
DRG: 870 | End: 2019-01-01
Payer: MEDICARE

## 2019-01-01 ENCOUNTER — HOSPITAL ENCOUNTER (INPATIENT)
Age: 74
LOS: 12 days | DRG: 870 | End: 2019-09-28
Attending: EMERGENCY MEDICINE | Admitting: INTERNAL MEDICINE
Payer: MEDICARE

## 2019-01-01 ENCOUNTER — APPOINTMENT (OUTPATIENT)
Dept: GENERAL RADIOLOGY | Age: 74
DRG: 640 | End: 2019-01-01
Payer: MEDICARE

## 2019-01-01 ENCOUNTER — HOSPITAL ENCOUNTER (OUTPATIENT)
Dept: WOUND CARE | Age: 74
Discharge: HOME | End: 2019-07-24
Payer: MEDICARE

## 2019-01-01 ENCOUNTER — APPOINTMENT (OUTPATIENT)
Dept: CT IMAGING | Age: 74
DRG: 870 | End: 2019-01-01
Payer: MEDICARE

## 2019-01-01 ENCOUNTER — APPOINTMENT (OUTPATIENT)
Dept: CT IMAGING | Age: 74
DRG: 871 | End: 2019-01-01
Payer: MEDICARE

## 2019-01-01 ENCOUNTER — APPOINTMENT (OUTPATIENT)
Dept: CT IMAGING | Age: 74
DRG: 394 | End: 2019-01-01
Payer: MEDICARE

## 2019-01-01 ENCOUNTER — APPOINTMENT (OUTPATIENT)
Dept: ULTRASOUND IMAGING | Age: 74
End: 2019-01-01
Attending: SURGERY
Payer: MEDICARE

## 2019-01-01 ENCOUNTER — HOSPITAL ENCOUNTER (OUTPATIENT)
Dept: WOUND CARE | Age: 74
Discharge: HOME | End: 2019-05-22
Payer: MEDICARE

## 2019-01-01 ENCOUNTER — HOSPITAL ENCOUNTER (OUTPATIENT)
Dept: WOUND CARE | Age: 74
Discharge: HOME | End: 2019-07-31
Payer: MEDICARE

## 2019-01-01 ENCOUNTER — HOSPITAL ENCOUNTER (OUTPATIENT)
Dept: WOUND CARE | Age: 74
Discharge: HOME | End: 2019-04-10
Payer: MEDICARE

## 2019-01-01 ENCOUNTER — HOSPITAL ENCOUNTER (OUTPATIENT)
Dept: CARDIAC CATH/INVASIVE PROCEDURES | Age: 74
Setting detail: OBSERVATION
Discharge: HOME | End: 2019-05-01
Attending: SURGERY | Admitting: SURGERY
Payer: MEDICARE

## 2019-01-01 ENCOUNTER — HOSPITAL ENCOUNTER (OUTPATIENT)
Dept: WOUND CARE | Age: 74
Discharge: HOME | End: 2019-05-29
Payer: MEDICARE

## 2019-01-01 ENCOUNTER — HOSPITAL ENCOUNTER (EMERGENCY)
Age: 74
Discharge: HOME | End: 2019-02-24
Attending: EMERGENCY MEDICINE
Payer: MEDICARE

## 2019-01-01 ENCOUNTER — HOSPITAL ENCOUNTER (OUTPATIENT)
Dept: INTERVENTIONAL RADIOLOGY/VASCULAR | Age: 74
Discharge: HOME | End: 2019-03-22
Payer: MEDICARE

## 2019-01-01 ENCOUNTER — ANESTHESIA EVENT (OUTPATIENT)
Dept: ICU | Age: 74
DRG: 870 | End: 2019-01-01
Payer: MEDICARE

## 2019-01-01 ENCOUNTER — HOSPITAL ENCOUNTER (OUTPATIENT)
Dept: WOUND CARE | Age: 74
Discharge: HOME | End: 2019-05-01
Payer: MEDICARE

## 2019-01-01 ENCOUNTER — HOSPITAL ENCOUNTER (OUTPATIENT)
Dept: CT IMAGING | Age: 74
Discharge: HOME | End: 2019-06-19
Payer: MEDICARE

## 2019-01-01 ENCOUNTER — TELEPHONE (OUTPATIENT)
Dept: PULMONOLOGY | Age: 74
End: 2019-01-01

## 2019-01-01 ENCOUNTER — HOSPITAL ENCOUNTER (OUTPATIENT)
Dept: WOUND CARE | Age: 74
Discharge: HOME | End: 2019-05-08
Payer: MEDICARE

## 2019-01-01 ENCOUNTER — HOSPITAL ENCOUNTER (OUTPATIENT)
Dept: WOUND CARE | Age: 74
Discharge: HOME | End: 2019-03-27
Payer: MEDICARE

## 2019-01-01 ENCOUNTER — HOSPITAL ENCOUNTER (OUTPATIENT)
Age: 74
Discharge: HOME | End: 2019-06-14
Payer: MEDICARE

## 2019-01-01 ENCOUNTER — HOSPITAL ENCOUNTER (OUTPATIENT)
Dept: WOUND CARE | Age: 74
Discharge: HOME | End: 2019-07-17
Payer: MEDICARE

## 2019-01-01 ENCOUNTER — APPOINTMENT (OUTPATIENT)
Dept: ULTRASOUND IMAGING | Age: 74
DRG: 870 | End: 2019-01-01
Payer: MEDICARE

## 2019-01-01 ENCOUNTER — APPOINTMENT (OUTPATIENT)
Dept: NUCLEAR MEDICINE | Age: 74
DRG: 640 | End: 2019-01-01
Payer: MEDICARE

## 2019-01-01 ENCOUNTER — HOSPITAL ENCOUNTER (OUTPATIENT)
Dept: WOUND CARE | Age: 74
Discharge: HOME | End: 2019-03-06
Payer: MEDICARE

## 2019-01-01 ENCOUNTER — APPOINTMENT (OUTPATIENT)
Dept: GENERAL RADIOLOGY | Age: 74
End: 2019-01-01
Payer: MEDICARE

## 2019-01-01 ENCOUNTER — HOSPITAL ENCOUNTER (OUTPATIENT)
Age: 74
Discharge: HOME | End: 2019-03-06
Payer: MEDICARE

## 2019-01-01 ENCOUNTER — HOSPITAL ENCOUNTER (OUTPATIENT)
Dept: WOUND CARE | Age: 74
Discharge: HOME | End: 2019-06-12
Payer: MEDICARE

## 2019-01-01 ENCOUNTER — OFFICE VISIT (OUTPATIENT)
Dept: PULMONOLOGY | Age: 74
End: 2019-01-01
Payer: MEDICARE

## 2019-01-01 ENCOUNTER — ANESTHESIA (OUTPATIENT)
Dept: CARDIAC CATH/INVASIVE PROCEDURES | Age: 74
End: 2019-01-01

## 2019-01-01 ENCOUNTER — HOSPITAL ENCOUNTER (INPATIENT)
Age: 74
LOS: 7 days | Discharge: HOME | DRG: 871 | End: 2019-08-22
Attending: EMERGENCY MEDICINE | Admitting: INTERNAL MEDICINE
Payer: MEDICARE

## 2019-01-01 ENCOUNTER — HOSPITAL ENCOUNTER (OUTPATIENT)
Dept: WOUND CARE | Age: 74
Discharge: HOME | End: 2019-08-07
Payer: MEDICARE

## 2019-01-01 ENCOUNTER — HOSPITAL ENCOUNTER (OUTPATIENT)
Dept: WOUND CARE | Age: 74
Discharge: HOME | End: 2019-07-03
Payer: MEDICARE

## 2019-01-01 ENCOUNTER — HOSPITAL ENCOUNTER (INPATIENT)
Age: 74
LOS: 3 days | Discharge: HOME HEALTH | DRG: 640 | End: 2019-08-28
Attending: EMERGENCY MEDICINE | Admitting: FAMILY MEDICINE
Payer: MEDICARE

## 2019-01-01 ENCOUNTER — HOSPITAL ENCOUNTER (OUTPATIENT)
Dept: WOUND CARE | Age: 74
Discharge: HOME | End: 2019-03-13
Payer: MEDICARE

## 2019-01-01 ENCOUNTER — HOSPITAL ENCOUNTER (OUTPATIENT)
Dept: WOUND CARE | Age: 74
Discharge: HOME | End: 2019-03-15
Payer: MEDICARE

## 2019-01-01 ENCOUNTER — HOSPITAL ENCOUNTER (OUTPATIENT)
Dept: PULMONOLOGY | Age: 74
Discharge: HOME | End: 2019-06-19
Payer: MEDICARE

## 2019-01-01 ENCOUNTER — HOSPITAL ENCOUNTER (OUTPATIENT)
Dept: INTERVENTIONAL RADIOLOGY/VASCULAR | Age: 74
Discharge: HOME | End: 2019-06-16
Payer: MEDICARE

## 2019-01-01 ENCOUNTER — APPOINTMENT (OUTPATIENT)
Dept: MRI IMAGING | Age: 74
DRG: 640 | End: 2019-01-01
Payer: MEDICARE

## 2019-01-01 ENCOUNTER — HOSPITAL ENCOUNTER (OUTPATIENT)
Dept: WOUND CARE | Age: 74
Discharge: HOME | End: 2019-06-05
Payer: MEDICARE

## 2019-01-01 ENCOUNTER — HOSPITAL ENCOUNTER (OUTPATIENT)
Dept: WOUND CARE | Age: 74
Discharge: HOME | End: 2019-07-10
Payer: MEDICARE

## 2019-01-01 ENCOUNTER — HOSPITAL ENCOUNTER (INPATIENT)
Age: 74
LOS: 2 days | Discharge: HOME | DRG: 394 | End: 2019-03-17
Attending: EMERGENCY MEDICINE | Admitting: SURGERY
Payer: MEDICARE

## 2019-01-01 ENCOUNTER — ANESTHESIA EVENT (OUTPATIENT)
Dept: CARDIAC CATH/INVASIVE PROCEDURES | Age: 74
End: 2019-01-01

## 2019-01-01 VITALS
HEART RATE: 81 BPM | TEMPERATURE: 97.6 F | HEIGHT: 61 IN | SYSTOLIC BLOOD PRESSURE: 191 MMHG | DIASTOLIC BLOOD PRESSURE: 88 MMHG | RESPIRATION RATE: 16 BRPM | HEART RATE: 72 BPM | TEMPERATURE: 98 F | WEIGHT: 140 LBS | WEIGHT: 150 LBS | RESPIRATION RATE: 16 BRPM | HEIGHT: 61 IN | BODY MASS INDEX: 26.43 KG/M2 | SYSTOLIC BLOOD PRESSURE: 138 MMHG | OXYGEN SATURATION: 95 % | BODY MASS INDEX: 28.32 KG/M2 | DIASTOLIC BLOOD PRESSURE: 90 MMHG

## 2019-01-01 VITALS
DIASTOLIC BLOOD PRESSURE: 70 MMHG | RESPIRATION RATE: 18 BRPM | TEMPERATURE: 97.9 F | SYSTOLIC BLOOD PRESSURE: 122 MMHG | HEART RATE: 76 BPM

## 2019-01-01 VITALS
HEIGHT: 62 IN | RESPIRATION RATE: 16 BRPM | HEART RATE: 64 BPM | TEMPERATURE: 98.3 F | BODY MASS INDEX: 27.6 KG/M2 | DIASTOLIC BLOOD PRESSURE: 92 MMHG | WEIGHT: 150 LBS | SYSTOLIC BLOOD PRESSURE: 152 MMHG

## 2019-01-01 VITALS
SYSTOLIC BLOOD PRESSURE: 145 MMHG | HEIGHT: 61 IN | OXYGEN SATURATION: 98 % | BODY MASS INDEX: 29.96 KG/M2 | SYSTOLIC BLOOD PRESSURE: 121 MMHG | RESPIRATION RATE: 18 BRPM | TEMPERATURE: 97.1 F | WEIGHT: 145 LBS | BODY MASS INDEX: 27.38 KG/M2 | WEIGHT: 158.7 LBS | OXYGEN SATURATION: 94 % | HEART RATE: 72 BPM | HEIGHT: 61 IN | HEART RATE: 69 BPM | DIASTOLIC BLOOD PRESSURE: 62 MMHG | RESPIRATION RATE: 16 BRPM | TEMPERATURE: 97.3 F | DIASTOLIC BLOOD PRESSURE: 67 MMHG

## 2019-01-01 VITALS
TEMPERATURE: 98 F | HEART RATE: 95 BPM | RESPIRATION RATE: 16 BRPM | DIASTOLIC BLOOD PRESSURE: 82 MMHG | OXYGEN SATURATION: 97 % | BODY MASS INDEX: 28.34 KG/M2 | SYSTOLIC BLOOD PRESSURE: 177 MMHG | WEIGHT: 150 LBS

## 2019-01-01 VITALS
BODY MASS INDEX: 26.43 KG/M2 | SYSTOLIC BLOOD PRESSURE: 132 MMHG | WEIGHT: 140 LBS | HEIGHT: 61 IN | DIASTOLIC BLOOD PRESSURE: 70 MMHG | HEART RATE: 78 BPM | TEMPERATURE: 98.2 F | RESPIRATION RATE: 18 BRPM

## 2019-01-01 VITALS
BODY MASS INDEX: 28.32 KG/M2 | HEIGHT: 61 IN | DIASTOLIC BLOOD PRESSURE: 70 MMHG | RESPIRATION RATE: 18 BRPM | WEIGHT: 150 LBS | TEMPERATURE: 97.8 F | HEART RATE: 74 BPM | SYSTOLIC BLOOD PRESSURE: 140 MMHG

## 2019-01-01 VITALS
HEART RATE: 72 BPM | RESPIRATION RATE: 16 BRPM | DIASTOLIC BLOOD PRESSURE: 80 MMHG | SYSTOLIC BLOOD PRESSURE: 176 MMHG | BODY MASS INDEX: 26.45 KG/M2 | TEMPERATURE: 98.6 F | WEIGHT: 140 LBS

## 2019-01-01 VITALS
TEMPERATURE: 96.8 F | DIASTOLIC BLOOD PRESSURE: 60 MMHG | RESPIRATION RATE: 10 BRPM | BODY MASS INDEX: 31.8 KG/M2 | HEIGHT: 63 IN | SYSTOLIC BLOOD PRESSURE: 113 MMHG | WEIGHT: 179.5 LBS | OXYGEN SATURATION: 35 % | HEART RATE: 104 BPM

## 2019-01-01 VITALS
TEMPERATURE: 98.6 F | WEIGHT: 140 LBS | DIASTOLIC BLOOD PRESSURE: 80 MMHG | HEIGHT: 60 IN | HEART RATE: 81 BPM | RESPIRATION RATE: 18 BRPM | BODY MASS INDEX: 27.48 KG/M2 | SYSTOLIC BLOOD PRESSURE: 140 MMHG

## 2019-01-01 VITALS
WEIGHT: 140 LBS | BODY MASS INDEX: 26.43 KG/M2 | DIASTOLIC BLOOD PRESSURE: 80 MMHG | SYSTOLIC BLOOD PRESSURE: 142 MMHG | RESPIRATION RATE: 18 BRPM | HEART RATE: 100 BPM | HEIGHT: 61 IN

## 2019-01-01 VITALS
BODY MASS INDEX: 25.61 KG/M2 | SYSTOLIC BLOOD PRESSURE: 144 MMHG | TEMPERATURE: 98.4 F | RESPIRATION RATE: 20 BRPM | HEIGHT: 64 IN | DIASTOLIC BLOOD PRESSURE: 78 MMHG | HEART RATE: 92 BPM | WEIGHT: 150 LBS

## 2019-01-01 VITALS
SYSTOLIC BLOOD PRESSURE: 142 MMHG | RESPIRATION RATE: 18 BRPM | TEMPERATURE: 97.2 F | WEIGHT: 137.3 LBS | OXYGEN SATURATION: 96 % | DIASTOLIC BLOOD PRESSURE: 73 MMHG | HEIGHT: 61 IN | BODY MASS INDEX: 25.92 KG/M2 | HEART RATE: 71 BPM

## 2019-01-01 VITALS
RESPIRATION RATE: 20 BRPM | TEMPERATURE: 98 F | DIASTOLIC BLOOD PRESSURE: 68 MMHG | SYSTOLIC BLOOD PRESSURE: 124 MMHG | HEART RATE: 72 BPM

## 2019-01-01 VITALS
SYSTOLIC BLOOD PRESSURE: 140 MMHG | RESPIRATION RATE: 14 BRPM | DIASTOLIC BLOOD PRESSURE: 98 MMHG | HEART RATE: 72 BPM | TEMPERATURE: 98 F

## 2019-01-01 VITALS
TEMPERATURE: 98.8 F | WEIGHT: 140 LBS | HEART RATE: 80 BPM | DIASTOLIC BLOOD PRESSURE: 80 MMHG | SYSTOLIC BLOOD PRESSURE: 158 MMHG | RESPIRATION RATE: 18 BRPM | HEIGHT: 60 IN | BODY MASS INDEX: 27.48 KG/M2

## 2019-01-01 VITALS — SYSTOLIC BLOOD PRESSURE: 160 MMHG | OXYGEN SATURATION: 95 % | DIASTOLIC BLOOD PRESSURE: 81 MMHG

## 2019-01-01 DIAGNOSIS — E11.622 DIABETIC ULCER OF RIGHT CALF ASSOCIATED WITH TYPE 2 DIABETES MELLITUS, WITH FAT LAYER EXPOSED (HCC): Chronic | ICD-10-CM

## 2019-01-01 DIAGNOSIS — L97.212 DIABETIC ULCER OF RIGHT CALF ASSOCIATED WITH TYPE 2 DIABETES MELLITUS, WITH FAT LAYER EXPOSED (HCC): Primary | Chronic | ICD-10-CM

## 2019-01-01 DIAGNOSIS — J96.02 ACUTE RESPIRATORY FAILURE WITH HYPOXIA AND HYPERCAPNIA (HCC): Primary | ICD-10-CM

## 2019-01-01 DIAGNOSIS — L97.212 DIABETIC ULCER OF RIGHT CALF ASSOCIATED WITH TYPE 2 DIABETES MELLITUS, WITH FAT LAYER EXPOSED (HCC): Chronic | ICD-10-CM

## 2019-01-01 DIAGNOSIS — J44.1 CHRONIC OBSTRUCTIVE PULMONARY DISEASE WITH ACUTE EXACERBATION (HCC): Primary | ICD-10-CM

## 2019-01-01 DIAGNOSIS — R26.2 AMBULATORY DYSFUNCTION: ICD-10-CM

## 2019-01-01 DIAGNOSIS — A41.9 SEPTICEMIA (HCC): Primary | ICD-10-CM

## 2019-01-01 DIAGNOSIS — K43.6 STRANGULATED VENTRAL HERNIA: Primary | ICD-10-CM

## 2019-01-01 DIAGNOSIS — I70.232 ATHEROSCLEROSIS OF NATIVE ARTERY OF RIGHT LOWER EXTREMITY WITH ULCERATION OF CALF (HCC): Primary | Chronic | ICD-10-CM

## 2019-01-01 DIAGNOSIS — I70.232 ATHEROSCLEROSIS OF NATIVE ARTERY OF RIGHT LOWER EXTREMITY WITH ULCERATION OF CALF (HCC): Chronic | ICD-10-CM

## 2019-01-01 DIAGNOSIS — L97.212 CALF ULCER, RIGHT, WITH FAT LAYER EXPOSED (HCC): Chronic | ICD-10-CM

## 2019-01-01 DIAGNOSIS — J18.9 PNEUMONIA DUE TO ORGANISM: ICD-10-CM

## 2019-01-01 DIAGNOSIS — I73.9 PVD (PERIPHERAL VASCULAR DISEASE) (HCC): ICD-10-CM

## 2019-01-01 DIAGNOSIS — T14.8XXA TRAUMATIC HEMATOMA: Primary | ICD-10-CM

## 2019-01-01 DIAGNOSIS — E11.622 DIABETIC ULCER OF RIGHT CALF ASSOCIATED WITH TYPE 2 DIABETES MELLITUS, WITH FAT LAYER EXPOSED (HCC): Primary | Chronic | ICD-10-CM

## 2019-01-01 DIAGNOSIS — J44.9 COPD MIXED TYPE (HCC): Primary | ICD-10-CM

## 2019-01-01 DIAGNOSIS — J44.9 COPD MIXED TYPE (HCC): ICD-10-CM

## 2019-01-01 DIAGNOSIS — N17.9 ACUTE KIDNEY INJURY SUPERIMPOSED ON CHRONIC KIDNEY DISEASE (HCC): ICD-10-CM

## 2019-01-01 DIAGNOSIS — N18.9 ACUTE KIDNEY INJURY SUPERIMPOSED ON CHRONIC KIDNEY DISEASE (HCC): ICD-10-CM

## 2019-01-01 DIAGNOSIS — J44.1 CHRONIC OBSTRUCTIVE PULMONARY DISEASE WITH ACUTE EXACERBATION (HCC): ICD-10-CM

## 2019-01-01 DIAGNOSIS — J96.01 ACUTE RESPIRATORY FAILURE WITH HYPOXIA AND HYPERCAPNIA (HCC): Primary | ICD-10-CM

## 2019-01-01 DIAGNOSIS — R53.1 GENERALIZED WEAKNESS: Primary | ICD-10-CM

## 2019-01-01 DIAGNOSIS — Z46.59 ENCOUNTER FOR NASOGASTRIC TUBE PLACEMENT: ICD-10-CM

## 2019-01-01 DIAGNOSIS — J96.00 ACUTE RESPIRATORY FAILURE, UNSPECIFIED WHETHER WITH HYPOXIA OR HYPERCAPNIA (HCC): ICD-10-CM

## 2019-01-01 DIAGNOSIS — N39.0 URINARY TRACT INFECTION WITHOUT HEMATURIA, SITE UNSPECIFIED: ICD-10-CM

## 2019-01-01 LAB
6AM URINE: <10 NG/ML
AADO2: 121 MMHG
AADO2: 147.7 MMHG
AADO2: 162.4 MMHG
AADO2: 208.8 MMHG
AADO2: 213.8 MMHG
AADO2: 221.7 MMHG
AADO2: 222.6 MMHG
AADO2: 231.2 MMHG
AADO2: 234.5 MMHG
AADO2: 240.4 MMHG
AADO2: 282.1 MMHG
AADO2: 286.4 MMHG
AADO2: 368.9 MMHG
AADO2: 523.4 MMHG
ABO/RH: NORMAL
ACANTHOCYTES: ABNORMAL
ALBUMIN SERPL-MCNC: 2.4 G/DL (ref 3.5–5.2)
ALBUMIN SERPL-MCNC: 2.6 G/DL (ref 3.5–5.2)
ALBUMIN SERPL-MCNC: 2.7 G/DL (ref 3.5–5.2)
ALBUMIN SERPL-MCNC: 2.8 G/DL (ref 3.5–5.2)
ALBUMIN SERPL-MCNC: 2.8 G/DL (ref 3.5–5.2)
ALBUMIN SERPL-MCNC: 2.9 G/DL (ref 3.5–5.2)
ALBUMIN SERPL-MCNC: 3 G/DL (ref 3.5–5.2)
ALBUMIN SERPL-MCNC: 3.1 G/DL (ref 3.5–5.2)
ALBUMIN SERPL-MCNC: 3.3 G/DL (ref 3.5–5.2)
ALBUMIN SERPL-MCNC: 3.4 G/DL (ref 3.5–5.2)
ALBUMIN SERPL-MCNC: 3.5 G/DL (ref 3.5–5.2)
ALBUMIN SERPL-MCNC: 3.5 G/DL (ref 3.5–5.2)
ALBUMIN SERPL-MCNC: 3.6 G/DL (ref 3.5–5.2)
ALBUMIN SERPL-MCNC: 3.7 G/DL (ref 3.5–5.2)
ALBUMIN SERPL-MCNC: 3.8 G/DL (ref 3.5–5.2)
ALDOSTERONE: 21.4 NG/DL
ALP BLD-CCNC: 44 U/L (ref 35–104)
ALP BLD-CCNC: 45 U/L (ref 35–104)
ALP BLD-CCNC: 46 U/L (ref 35–104)
ALP BLD-CCNC: 47 U/L (ref 35–104)
ALP BLD-CCNC: 47 U/L (ref 35–104)
ALP BLD-CCNC: 48 U/L (ref 35–104)
ALP BLD-CCNC: 54 U/L (ref 35–104)
ALP BLD-CCNC: 55 U/L (ref 35–104)
ALP BLD-CCNC: 59 U/L (ref 35–104)
ALP BLD-CCNC: 62 U/L (ref 35–104)
ALP BLD-CCNC: 62 U/L (ref 35–104)
ALP BLD-CCNC: 68 U/L (ref 35–104)
ALP BLD-CCNC: 70 U/L (ref 35–104)
ALP BLD-CCNC: 74 U/L (ref 35–104)
ALP BLD-CCNC: 75 U/L (ref 35–104)
ALP BLD-CCNC: 83 U/L (ref 35–104)
ALP BLD-CCNC: 85 U/L (ref 35–104)
ALP BLD-CCNC: 89 U/L (ref 35–104)
ALT SERPL-CCNC: 13 U/L (ref 0–32)
ALT SERPL-CCNC: 15 U/L (ref 0–32)
ALT SERPL-CCNC: 17 U/L (ref 0–32)
ALT SERPL-CCNC: 20 U/L (ref 0–32)
ALT SERPL-CCNC: 22 U/L (ref 0–32)
ALT SERPL-CCNC: 22 U/L (ref 0–32)
ALT SERPL-CCNC: 23 U/L (ref 0–32)
ALT SERPL-CCNC: 24 U/L (ref 0–32)
ALT SERPL-CCNC: 26 U/L (ref 0–32)
ALT SERPL-CCNC: 28 U/L (ref 0–32)
ALT SERPL-CCNC: 32 U/L (ref 0–32)
ALT SERPL-CCNC: 32 U/L (ref 0–32)
AMPHETAMINE SCREEN, URINE: POSITIVE
AMPHETAMINES, URINE: 1908 NG/ML
ANAEROBIC CULTURE: NORMAL
ANION GAP SERPL CALCULATED.3IONS-SCNC: 10 MMOL/L (ref 7–16)
ANION GAP SERPL CALCULATED.3IONS-SCNC: 11 MMOL/L (ref 7–16)
ANION GAP SERPL CALCULATED.3IONS-SCNC: 12 MMOL/L (ref 7–16)
ANION GAP SERPL CALCULATED.3IONS-SCNC: 13 MMOL/L (ref 7–16)
ANION GAP SERPL CALCULATED.3IONS-SCNC: 13 MMOL/L (ref 7–16)
ANION GAP SERPL CALCULATED.3IONS-SCNC: 14 MMOL/L (ref 7–16)
ANION GAP SERPL CALCULATED.3IONS-SCNC: 16 MMOL/L (ref 7–16)
ANION GAP SERPL CALCULATED.3IONS-SCNC: 16 MMOL/L (ref 7–16)
ANION GAP SERPL CALCULATED.3IONS-SCNC: 6 MMOL/L (ref 7–16)
ANION GAP SERPL CALCULATED.3IONS-SCNC: 6 MMOL/L (ref 7–16)
ANION GAP SERPL CALCULATED.3IONS-SCNC: 7 MMOL/L (ref 7–16)
ANION GAP SERPL CALCULATED.3IONS-SCNC: 8 MMOL/L (ref 7–16)
ANION GAP SERPL CALCULATED.3IONS-SCNC: 9 MMOL/L (ref 7–16)
ANISOCYTOSIS: ABNORMAL
ANTI-NUCLEAR ANTIBODY (ANA): NEGATIVE
ANTIBODY SCREEN: NORMAL
APPEARANCE FLUID: CLEAR
APPEARANCE FLUID: NORMAL
APPEARANCE FLUID: NORMAL
APTT: 25.9 SEC (ref 24.5–35.1)
APTT: 30.2 SEC (ref 24.5–35.1)
AST SERPL-CCNC: 17 U/L (ref 0–31)
AST SERPL-CCNC: 18 U/L (ref 0–31)
AST SERPL-CCNC: 18 U/L (ref 0–31)
AST SERPL-CCNC: 19 U/L (ref 0–31)
AST SERPL-CCNC: 21 U/L (ref 0–31)
AST SERPL-CCNC: 22 U/L (ref 0–31)
AST SERPL-CCNC: 23 U/L (ref 0–31)
AST SERPL-CCNC: 24 U/L (ref 0–31)
AST SERPL-CCNC: 24 U/L (ref 0–31)
AST SERPL-CCNC: 26 U/L (ref 0–31)
AST SERPL-CCNC: 27 U/L (ref 0–31)
AST SERPL-CCNC: 28 U/L (ref 0–31)
AST SERPL-CCNC: 30 U/L (ref 0–31)
AST SERPL-CCNC: 33 U/L (ref 0–31)
AST SERPL-CCNC: 35 U/L (ref 0–31)
AST SERPL-CCNC: 35 U/L (ref 0–31)
AST SERPL-CCNC: 38 U/L (ref 0–31)
AST SERPL-CCNC: 40 U/L (ref 0–31)
AST SERPL-CCNC: 40 U/L (ref 0–31)
AST SERPL-CCNC: 43 U/L (ref 0–31)
AST SERPL-CCNC: 66 U/L (ref 0–31)
B.E.: -0.5 MMOL/L (ref -3–3)
B.E.: -0.8 MMOL/L (ref -3–3)
B.E.: -2.7 MMOL/L (ref -3–3)
B.E.: -3.5 MMOL/L (ref -3–3)
B.E.: -4.4 MMOL/L (ref -3–3)
B.E.: -4.7 MMOL/L (ref -3–3)
B.E.: -5.3 MMOL/L (ref -3–3)
B.E.: -7.3 MMOL/L (ref -3–3)
B.E.: 0 MMOL/L (ref -3–3)
B.E.: 0.1 MMOL/L (ref -3–3)
B.E.: 0.2 MMOL/L (ref -3–3)
B.E.: 0.4 MMOL/L (ref -3–3)
B.E.: 0.8 MMOL/L (ref -3–3)
B.E.: 1 MMOL/L (ref -3–3)
B.E.: 1 MMOL/L (ref -3–3)
B.E.: 1.5 MMOL/L (ref -3–3)
B.E.: 2.5 MMOL/L (ref -3–3)
B.E.: 2.9 MMOL/L (ref -3–3)
B.E.: 4.5 MMOL/L (ref -3–3)
BACTERIA: ABNORMAL /HPF
BARBITURATE SCREEN URINE: NOT DETECTED
BASO FLUID: 0 %
BASOPHILIC STIPPLING: ABNORMAL
BASOPHILS ABSOLUTE: 0 E9/L (ref 0–0.2)
BASOPHILS ABSOLUTE: 0.01 E9/L (ref 0–0.2)
BASOPHILS ABSOLUTE: 0.04 E9/L (ref 0–0.2)
BASOPHILS RELATIVE PERCENT: 0 % (ref 0–2)
BASOPHILS RELATIVE PERCENT: 0.1 % (ref 0–2)
BASOPHILS RELATIVE PERCENT: 0.2 % (ref 0–2)
BASOPHILS RELATIVE PERCENT: 0.3 % (ref 0–2)
BASOPHILS RELATIVE PERCENT: 0.4 % (ref 0–2)
BASOPHILS RELATIVE PERCENT: 0.5 % (ref 0–2)
BASOPHILS RELATIVE PERCENT: 0.6 % (ref 0–2)
BASOPHILS RELATIVE PERCENT: 0.9 % (ref 0–2)
BENZODIAZEPINE SCREEN, URINE: NOT DETECTED
BILIRUB SERPL-MCNC: 0.2 MG/DL (ref 0–1.2)
BILIRUB SERPL-MCNC: 0.2 MG/DL (ref 0–1.2)
BILIRUB SERPL-MCNC: 0.3 MG/DL (ref 0–1.2)
BILIRUB SERPL-MCNC: 0.4 MG/DL (ref 0–1.2)
BILIRUB SERPL-MCNC: 0.5 MG/DL (ref 0–1.2)
BILIRUB SERPL-MCNC: 0.6 MG/DL (ref 0–1.2)
BILIRUB SERPL-MCNC: 0.7 MG/DL (ref 0–1.2)
BILIRUB SERPL-MCNC: 0.9 MG/DL (ref 0–1.2)
BILIRUB SERPL-MCNC: 0.9 MG/DL (ref 0–1.2)
BILIRUBIN URINE: NEGATIVE
BLASTS RELATIVE PERCENT: 0.9 % (ref 0–0)
BLASTS RELATIVE PERCENT: 0.9 % (ref 0–0)
BLOOD BANK DISPENSE STATUS: NORMAL
BLOOD BANK PRODUCT CODE: NORMAL
BLOOD CULTURE, ROUTINE: ABNORMAL
BLOOD CULTURE, ROUTINE: ABNORMAL
BLOOD CULTURE, ROUTINE: NORMAL
BLOOD, URINE: NEGATIVE
BODY FLUID CULTURE, STERILE: NORMAL
BPU ID: NORMAL
BUN BLDV-MCNC: 20 MG/DL (ref 8–23)
BUN BLDV-MCNC: 21 MG/DL (ref 8–23)
BUN BLDV-MCNC: 21 MG/DL (ref 8–23)
BUN BLDV-MCNC: 23 MG/DL (ref 8–23)
BUN BLDV-MCNC: 23 MG/DL (ref 8–23)
BUN BLDV-MCNC: 24 MG/DL (ref 8–23)
BUN BLDV-MCNC: 24 MG/DL (ref 8–23)
BUN BLDV-MCNC: 26 MG/DL (ref 8–23)
BUN BLDV-MCNC: 27 MG/DL (ref 8–23)
BUN BLDV-MCNC: 27 MG/DL (ref 8–23)
BUN BLDV-MCNC: 28 MG/DL (ref 8–23)
BUN BLDV-MCNC: 29 MG/DL (ref 8–23)
BUN BLDV-MCNC: 29 MG/DL (ref 8–23)
BUN BLDV-MCNC: 31 MG/DL (ref 8–23)
BUN BLDV-MCNC: 32 MG/DL (ref 8–23)
BUN BLDV-MCNC: 35 MG/DL (ref 8–23)
BUN BLDV-MCNC: 36 MG/DL (ref 8–23)
BUN BLDV-MCNC: 38 MG/DL (ref 8–23)
BUN BLDV-MCNC: 39 MG/DL (ref 8–23)
BUN BLDV-MCNC: 39 MG/DL (ref 8–23)
BUN BLDV-MCNC: 40 MG/DL (ref 8–23)
BUN BLDV-MCNC: 40 MG/DL (ref 8–23)
BUN BLDV-MCNC: 42 MG/DL (ref 8–23)
BUN BLDV-MCNC: 46 MG/DL (ref 8–23)
BUN BLDV-MCNC: 47 MG/DL (ref 8–23)
BUN BLDV-MCNC: 48 MG/DL (ref 8–23)
BUN BLDV-MCNC: 48 MG/DL (ref 8–23)
BUN BLDV-MCNC: 51 MG/DL (ref 8–23)
BUN BLDV-MCNC: 55 MG/DL (ref 8–23)
BUN BLDV-MCNC: 56 MG/DL (ref 8–23)
BUN BLDV-MCNC: 56 MG/DL (ref 8–23)
BUN BLDV-MCNC: 59 MG/DL (ref 8–23)
BUN BLDV-MCNC: 60 MG/DL (ref 8–23)
BUN BLDV-MCNC: 68 MG/DL (ref 8–23)
BUN BLDV-MCNC: 68 MG/DL (ref 8–23)
BURR CELLS: ABNORMAL
CALCIUM SERPL-MCNC: 10.1 MG/DL (ref 8.6–10.2)
CALCIUM SERPL-MCNC: 10.2 MG/DL (ref 8.6–10.2)
CALCIUM SERPL-MCNC: 10.4 MG/DL (ref 8.6–10.2)
CALCIUM SERPL-MCNC: 7.9 MG/DL (ref 8.6–10.2)
CALCIUM SERPL-MCNC: 8.1 MG/DL (ref 8.6–10.2)
CALCIUM SERPL-MCNC: 8.1 MG/DL (ref 8.6–10.2)
CALCIUM SERPL-MCNC: 8.2 MG/DL (ref 8.6–10.2)
CALCIUM SERPL-MCNC: 8.2 MG/DL (ref 8.6–10.2)
CALCIUM SERPL-MCNC: 8.3 MG/DL (ref 8.6–10.2)
CALCIUM SERPL-MCNC: 8.4 MG/DL (ref 8.6–10.2)
CALCIUM SERPL-MCNC: 8.6 MG/DL (ref 8.6–10.2)
CALCIUM SERPL-MCNC: 8.7 MG/DL (ref 8.6–10.2)
CALCIUM SERPL-MCNC: 8.7 MG/DL (ref 8.6–10.2)
CALCIUM SERPL-MCNC: 8.8 MG/DL (ref 8.6–10.2)
CALCIUM SERPL-MCNC: 9 MG/DL (ref 8.6–10.2)
CALCIUM SERPL-MCNC: 9.1 MG/DL (ref 8.6–10.2)
CALCIUM SERPL-MCNC: 9.1 MG/DL (ref 8.6–10.2)
CALCIUM SERPL-MCNC: 9.2 MG/DL (ref 8.6–10.2)
CALCIUM SERPL-MCNC: 9.3 MG/DL (ref 8.6–10.2)
CALCIUM SERPL-MCNC: 9.3 MG/DL (ref 8.6–10.2)
CALCIUM SERPL-MCNC: 9.4 MG/DL (ref 8.6–10.2)
CALCIUM SERPL-MCNC: 9.4 MG/DL (ref 8.6–10.2)
CALCIUM SERPL-MCNC: 9.5 MG/DL (ref 8.6–10.2)
CALCIUM SERPL-MCNC: 9.6 MG/DL (ref 8.6–10.2)
CALCIUM SERPL-MCNC: 9.7 MG/DL (ref 8.6–10.2)
CALCIUM SERPL-MCNC: 9.8 MG/DL (ref 8.6–10.2)
CANNABINOID SCREEN URINE: NOT DETECTED
CASTS: ABNORMAL /LPF
CELL COUNT FLUID TYPE: NORMAL
CELL COUNT FLUID TYPE: NORMAL
CHLORIDE BLD-SCNC: 100 MMOL/L (ref 98–107)
CHLORIDE BLD-SCNC: 101 MMOL/L (ref 98–107)
CHLORIDE BLD-SCNC: 102 MMOL/L (ref 98–107)
CHLORIDE BLD-SCNC: 103 MMOL/L (ref 98–107)
CHLORIDE BLD-SCNC: 104 MMOL/L (ref 98–107)
CHLORIDE BLD-SCNC: 105 MMOL/L (ref 98–107)
CHLORIDE BLD-SCNC: 107 MMOL/L (ref 98–107)
CHLORIDE BLD-SCNC: 107 MMOL/L (ref 98–107)
CHLORIDE BLD-SCNC: 108 MMOL/L (ref 98–107)
CHLORIDE BLD-SCNC: 97 MMOL/L (ref 98–107)
CHLORIDE BLD-SCNC: 98 MMOL/L (ref 98–107)
CHLORIDE BLD-SCNC: 99 MMOL/L (ref 98–107)
CHLORIDE URINE RANDOM: 51 MMOL/L
CHLORIDE URINE RANDOM: 66 MMOL/L
CHLORIDE URINE RANDOM: <20 MMOL/L
CLARITY: CLEAR
CO2: 19 MMOL/L (ref 22–29)
CO2: 21 MMOL/L (ref 22–29)
CO2: 22 MMOL/L (ref 22–29)
CO2: 22 MMOL/L (ref 22–29)
CO2: 23 MMOL/L (ref 22–29)
CO2: 23 MMOL/L (ref 22–29)
CO2: 24 MMOL/L (ref 22–29)
CO2: 25 MMOL/L (ref 22–29)
CO2: 26 MMOL/L (ref 22–29)
CO2: 27 MMOL/L (ref 22–29)
CO2: 28 MMOL/L (ref 22–29)
CO2: 29 MMOL/L (ref 22–29)
CO2: 29 MMOL/L (ref 22–29)
CO2: 30 MMOL/L (ref 22–29)
CO2: 30 MMOL/L (ref 22–29)
CO2: 31 MMOL/L (ref 22–29)
CO2: 32 MMOL/L (ref 22–29)
CO2: 33 MMOL/L (ref 22–29)
COCAINE METABOLITE SCREEN URINE: NOT DETECTED
CODEINE, URINE: <20 NG/ML
COHB: 0.4 % (ref 0–1.5)
COHB: 0.4 % (ref 0–1.5)
COHB: 0.6 % (ref 0–1.5)
COHB: 0.6 % (ref 0–1.5)
COHB: 0.7 % (ref 0–1.5)
COHB: 0.8 % (ref 0–1.5)
COHB: 0.8 % (ref 0–1.5)
COHB: 0.9 % (ref 0–1.5)
COHB: 1.1 % (ref 0–1.5)
COHB: 1.1 % (ref 0–1.5)
COHB: 1.4 % (ref 0–1.5)
COHB: 1.7 % (ref 0–1.5)
COHB: 1.9 % (ref 0–1.5)
COHB: 1.9 % (ref 0–1.5)
COHB: 2.9 % (ref 0–1.5)
COHB: 2.9 % (ref 0–1.5)
COLOR FLUID: COLORLESS
COLOR FLUID: NORMAL
COLOR: ABNORMAL
COLOR: YELLOW
COLOR: YELLOW
COMMENT: ABNORMAL
CORTISOL TOTAL: 28.17 MCG/DL (ref 2.68–18.4)
CREAT SERPL-MCNC: 1.2 MG/DL (ref 0.5–1)
CREAT SERPL-MCNC: 1.3 MG/DL (ref 0.5–1)
CREAT SERPL-MCNC: 1.4 MG/DL (ref 0.5–1)
CREAT SERPL-MCNC: 1.5 MG/DL (ref 0.5–1)
CREAT SERPL-MCNC: 1.6 MG/DL (ref 0.5–1)
CREAT SERPL-MCNC: 1.7 MG/DL (ref 0.5–1)
CREAT SERPL-MCNC: 1.8 MG/DL (ref 0.5–1)
CREAT SERPL-MCNC: 1.9 MG/DL (ref 0.5–1)
CREAT SERPL-MCNC: 2 MG/DL (ref 0.5–1)
CREAT SERPL-MCNC: 2 MG/DL (ref 0.5–1)
CREAT SERPL-MCNC: 2.1 MG/DL (ref 0.5–1)
CREATININE URINE: 119 MG/DL (ref 29–226)
CREATININE URINE: 40 MG/DL (ref 29–226)
CREATININE URINE: 47 MG/DL (ref 29–226)
CRITICAL: ABNORMAL
CULTURE SURGICAL: ABNORMAL
CULTURE SURGICAL: ABNORMAL
CULTURE, BLOOD 2: NORMAL
CULTURE, RESPIRATORY: ABNORMAL
CULTURE, RESPIRATORY: NORMAL
DAT POLYSPECIFIC: NORMAL
DATE ANALYZED: ABNORMAL
DATE OF COLLECTION: ABNORMAL
DESCRIPTION BLOOD BANK: NORMAL
EKG ATRIAL RATE: 105 BPM
EKG ATRIAL RATE: 249 BPM
EKG ATRIAL RATE: 252 BPM
EKG ATRIAL RATE: 260 BPM
EKG ATRIAL RATE: 300 BPM
EKG ATRIAL RATE: 52 BPM
EKG ATRIAL RATE: 91 BPM
EKG ATRIAL RATE: 95 BPM
EKG P AXIS: -80 DEGREES
EKG P AXIS: -88 DEGREES
EKG P AXIS: 20 DEGREES
EKG P AXIS: 24 DEGREES
EKG P AXIS: 27 DEGREES
EKG P AXIS: 89 DEGREES
EKG P AXIS: 91 DEGREES
EKG P-R INTERVAL: 158 MS
EKG P-R INTERVAL: 158 MS
EKG P-R INTERVAL: 162 MS
EKG P-R INTERVAL: 170 MS
EKG Q-T INTERVAL: 308 MS
EKG Q-T INTERVAL: 328 MS
EKG Q-T INTERVAL: 332 MS
EKG Q-T INTERVAL: 352 MS
EKG Q-T INTERVAL: 378 MS
EKG Q-T INTERVAL: 416 MS
EKG Q-T INTERVAL: 456 MS
EKG Q-T INTERVAL: 506 MS
EKG QRS DURATION: 102 MS
EKG QRS DURATION: 108 MS
EKG QRS DURATION: 112 MS
EKG QRS DURATION: 88 MS
EKG QRS DURATION: 88 MS
EKG QRS DURATION: 92 MS
EKG QRS DURATION: 94 MS
EKG QRS DURATION: 96 MS
EKG QTC CALCULATION (BAZETT): 412 MS
EKG QTC CALCULATION (BAZETT): 432 MS
EKG QTC CALCULATION (BAZETT): 432 MS
EKG QTC CALCULATION (BAZETT): 438 MS
EKG QTC CALCULATION (BAZETT): 444 MS
EKG QTC CALCULATION (BAZETT): 470 MS
EKG QTC CALCULATION (BAZETT): 483 MS
EKG QTC CALCULATION (BAZETT): 519 MS
EKG R AXIS: 10 DEGREES
EKG R AXIS: 12 DEGREES
EKG R AXIS: 21 DEGREES
EKG R AXIS: 24 DEGREES
EKG R AXIS: 26 DEGREES
EKG R AXIS: 29 DEGREES
EKG R AXIS: 33 DEGREES
EKG R AXIS: 45 DEGREES
EKG T AXIS: -139 DEGREES
EKG T AXIS: -167 DEGREES
EKG T AXIS: -168 DEGREES
EKG T AXIS: 126 DEGREES
EKG T AXIS: 170 DEGREES
EKG T AXIS: 60 DEGREES
EKG T AXIS: 66 DEGREES
EKG T AXIS: 94 DEGREES
EKG VENTRICULAR RATE: 105 BPM
EKG VENTRICULAR RATE: 148 BPM
EKG VENTRICULAR RATE: 52 BPM
EKG VENTRICULAR RATE: 65 BPM
EKG VENTRICULAR RATE: 78 BPM
EKG VENTRICULAR RATE: 83 BPM
EKG VENTRICULAR RATE: 91 BPM
EKG VENTRICULAR RATE: 95 BPM
EOSINOPHIL FLUID: 0 %
EOSINOPHIL, URINE: 0 % (ref 0–1)
EOSINOPHILS ABSOLUTE COUNT: 90 /UL (ref 50–250)
EOSINOPHILS ABSOLUTE: 0 E9/L (ref 0.05–0.5)
EOSINOPHILS ABSOLUTE: 0.01 E9/L (ref 0.05–0.5)
EOSINOPHILS ABSOLUTE: 0.01 E9/L (ref 0.05–0.5)
EOSINOPHILS ABSOLUTE: 0.02 E9/L (ref 0.05–0.5)
EOSINOPHILS ABSOLUTE: 0.03 E9/L (ref 0.05–0.5)
EOSINOPHILS ABSOLUTE: 0.06 E9/L (ref 0.05–0.5)
EOSINOPHILS ABSOLUTE: 0.06 E9/L (ref 0.05–0.5)
EOSINOPHILS ABSOLUTE: 0.08 E9/L (ref 0.05–0.5)
EOSINOPHILS ABSOLUTE: 0.09 E9/L (ref 0.05–0.5)
EOSINOPHILS ABSOLUTE: 0.09 E9/L (ref 0.05–0.5)
EOSINOPHILS ABSOLUTE: 0.15 E9/L (ref 0.05–0.5)
EOSINOPHILS ABSOLUTE: 0.16 E9/L (ref 0.05–0.5)
EOSINOPHILS RELATIVE PERCENT: 0 % (ref 0–6)
EOSINOPHILS RELATIVE PERCENT: 0.2 % (ref 0–6)
EOSINOPHILS RELATIVE PERCENT: 0.4 % (ref 0–6)
EOSINOPHILS RELATIVE PERCENT: 0.4 % (ref 0–6)
EOSINOPHILS RELATIVE PERCENT: 0.7 % (ref 0–6)
EOSINOPHILS RELATIVE PERCENT: 0.8 % (ref 0–6)
EOSINOPHILS RELATIVE PERCENT: 0.9 % (ref 0–6)
EOSINOPHILS RELATIVE PERCENT: 1.6 % (ref 0–6)
EOSINOPHILS RELATIVE PERCENT: 1.7 % (ref 0–6)
EOSINOPHILS RELATIVE PERCENT: 2.4 % (ref 0–6)
EOSINOPHILS RELATIVE PERCENT: 2.4 % (ref 0–6)
EOSINOPHILS RELATIVE PERCENT: 2.5 % (ref 0–6)
EOSINOPHILS RELATIVE PERCENT: 2.6 % (ref 0–6)
EPITHELIAL CELLS, UA: ABNORMAL /HPF
FERRITIN: 172 NG/ML
FILM ARRAY ADENOVIRUS: NORMAL
FILM ARRAY BORDETELLA PERTUSSIS: NORMAL
FILM ARRAY CHLAMYDOPHILIA PNEUMONIAE: NORMAL
FILM ARRAY CORONAVIRUS 229E: NORMAL
FILM ARRAY CORONAVIRUS HKU1: NORMAL
FILM ARRAY CORONAVIRUS NL63: NORMAL
FILM ARRAY CORONAVIRUS OC43: NORMAL
FILM ARRAY INFLUENZA A VIRUS 09H1: NORMAL
FILM ARRAY INFLUENZA A VIRUS H1: NORMAL
FILM ARRAY INFLUENZA A VIRUS H3: NORMAL
FILM ARRAY INFLUENZA A VIRUS: NORMAL
FILM ARRAY INFLUENZA B: NORMAL
FILM ARRAY METAPNEUMOVIRUS: NORMAL
FILM ARRAY MYCOPLASMA PNEUMONIAE: NORMAL
FILM ARRAY PARAINFLUENZA VIRUS 1: NORMAL
FILM ARRAY PARAINFLUENZA VIRUS 2: NORMAL
FILM ARRAY PARAINFLUENZA VIRUS 3: NORMAL
FILM ARRAY PARAINFLUENZA VIRUS 4: NORMAL
FILM ARRAY RESPIRATORY SYNCITIAL VIRUS: NORMAL
FILM ARRAY RHINOVIRUS/ENTEROVIRUS: NORMAL
FIO2: 100 %
FIO2: 40 %
FIO2: 50 %
FIO2: 60 %
FIO2: 80 %
FLUID TYPE: NORMAL
FLUID TYPE: NORMAL
FOLATE: 8.6 NG/ML (ref 4.8–24.2)
GFR AFRICAN AMERICAN: 28
GFR AFRICAN AMERICAN: 29
GFR AFRICAN AMERICAN: 29
GFR AFRICAN AMERICAN: 31
GFR AFRICAN AMERICAN: 33
GFR AFRICAN AMERICAN: 35
GFR AFRICAN AMERICAN: 38
GFR AFRICAN AMERICAN: 41
GFR AFRICAN AMERICAN: 44
GFR AFRICAN AMERICAN: 48
GFR AFRICAN AMERICAN: 53
GFR NON-AFRICAN AMERICAN: 23 ML/MIN/1.73
GFR NON-AFRICAN AMERICAN: 24 ML/MIN/1.73
GFR NON-AFRICAN AMERICAN: 24 ML/MIN/1.73
GFR NON-AFRICAN AMERICAN: 26 ML/MIN/1.73
GFR NON-AFRICAN AMERICAN: 27 ML/MIN/1.73
GFR NON-AFRICAN AMERICAN: 29 ML/MIN/1.73
GFR NON-AFRICAN AMERICAN: 31 ML/MIN/1.73
GFR NON-AFRICAN AMERICAN: 34 ML/MIN/1.73
GFR NON-AFRICAN AMERICAN: 37 ML/MIN/1.73
GFR NON-AFRICAN AMERICAN: 40 ML/MIN/1.73
GFR NON-AFRICAN AMERICAN: 44 ML/MIN/1.73
GLUCOSE BLD-MCNC: 109 MG/DL (ref 74–99)
GLUCOSE BLD-MCNC: 111 MG/DL (ref 74–99)
GLUCOSE BLD-MCNC: 121 MG/DL (ref 74–99)
GLUCOSE BLD-MCNC: 124 MG/DL (ref 74–99)
GLUCOSE BLD-MCNC: 128 MG/DL (ref 74–99)
GLUCOSE BLD-MCNC: 130 MG/DL (ref 74–99)
GLUCOSE BLD-MCNC: 131 MG/DL (ref 74–99)
GLUCOSE BLD-MCNC: 136 MG/DL (ref 74–99)
GLUCOSE BLD-MCNC: 142 MG/DL (ref 74–99)
GLUCOSE BLD-MCNC: 146 MG/DL (ref 74–99)
GLUCOSE BLD-MCNC: 155 MG/DL (ref 74–99)
GLUCOSE BLD-MCNC: 155 MG/DL (ref 74–99)
GLUCOSE BLD-MCNC: 160 MG/DL (ref 74–99)
GLUCOSE BLD-MCNC: 162 MG/DL (ref 74–99)
GLUCOSE BLD-MCNC: 164 MG/DL (ref 74–99)
GLUCOSE BLD-MCNC: 166 MG/DL (ref 74–99)
GLUCOSE BLD-MCNC: 168 MG/DL (ref 74–99)
GLUCOSE BLD-MCNC: 171 MG/DL (ref 74–99)
GLUCOSE BLD-MCNC: 173 MG/DL (ref 74–99)
GLUCOSE BLD-MCNC: 174 MG/DL (ref 74–99)
GLUCOSE BLD-MCNC: 175 MG/DL (ref 74–99)
GLUCOSE BLD-MCNC: 186 MG/DL (ref 74–99)
GLUCOSE BLD-MCNC: 187 MG/DL (ref 74–99)
GLUCOSE BLD-MCNC: 189 MG/DL (ref 74–99)
GLUCOSE BLD-MCNC: 196 MG/DL (ref 74–99)
GLUCOSE BLD-MCNC: 203 MG/DL (ref 74–99)
GLUCOSE BLD-MCNC: 214 MG/DL (ref 74–99)
GLUCOSE BLD-MCNC: 217 MG/DL (ref 74–99)
GLUCOSE BLD-MCNC: 299 MG/DL (ref 74–99)
GLUCOSE BLD-MCNC: 350 MG/DL (ref 74–99)
GLUCOSE BLD-MCNC: 72 MG/DL (ref 74–99)
GLUCOSE BLD-MCNC: 77 MG/DL (ref 74–99)
GLUCOSE BLD-MCNC: 79 MG/DL (ref 74–99)
GLUCOSE BLD-MCNC: 81 MG/DL (ref 74–99)
GLUCOSE BLD-MCNC: 81 MG/DL (ref 74–99)
GLUCOSE BLD-MCNC: 85 MG/DL (ref 74–99)
GLUCOSE BLD-MCNC: 86 MG/DL (ref 74–99)
GLUCOSE BLD-MCNC: 93 MG/DL (ref 74–99)
GLUCOSE BLD-MCNC: 94 MG/DL (ref 74–99)
GLUCOSE BLD-MCNC: 96 MG/DL (ref 74–99)
GLUCOSE BLD-MCNC: 99 MG/DL (ref 74–99)
GLUCOSE URINE: NEGATIVE MG/DL
GLUCOSE, FLUID: 155 MG/DL
GRAM STAIN ORDERABLE: NORMAL
GRAM STAIN RESULT: ABNORMAL
GRAM STAIN RESULT: NORMAL
HAPTOGLOBIN: 154 MG/DL (ref 30–200)
HAV IGM SER IA-ACNC: NORMAL
HBA1C MFR BLD: 4.7 % (ref 4–5.6)
HCO3: 21.4 MMOL/L (ref 22–26)
HCO3: 22.8 MMOL/L (ref 22–26)
HCO3: 23.4 MMOL/L (ref 22–26)
HCO3: 23.5 MMOL/L (ref 22–26)
HCO3: 23.6 MMOL/L (ref 22–26)
HCO3: 23.7 MMOL/L (ref 22–26)
HCO3: 24.4 MMOL/L (ref 22–26)
HCO3: 24.9 MMOL/L (ref 22–26)
HCO3: 25.3 MMOL/L (ref 22–26)
HCO3: 25.5 MMOL/L (ref 22–26)
HCO3: 25.8 MMOL/L (ref 22–26)
HCO3: 26.1 MMOL/L (ref 22–26)
HCO3: 26.5 MMOL/L (ref 22–26)
HCO3: 26.8 MMOL/L (ref 22–26)
HCO3: 27.7 MMOL/L (ref 22–26)
HCO3: 27.8 MMOL/L (ref 22–26)
HCO3: 28.5 MMOL/L (ref 22–26)
HCT VFR BLD CALC: 21.5 % (ref 34–48)
HCT VFR BLD CALC: 21.5 % (ref 34–48)
HCT VFR BLD CALC: 22.6 % (ref 34–48)
HCT VFR BLD CALC: 23.7 % (ref 34–48)
HCT VFR BLD CALC: 24.3 % (ref 34–48)
HCT VFR BLD CALC: 24.4 % (ref 34–48)
HCT VFR BLD CALC: 25 % (ref 34–48)
HCT VFR BLD CALC: 25.6 % (ref 34–48)
HCT VFR BLD CALC: 25.6 % (ref 34–48)
HCT VFR BLD CALC: 25.8 % (ref 34–48)
HCT VFR BLD CALC: 25.9 % (ref 34–48)
HCT VFR BLD CALC: 26.1 % (ref 34–48)
HCT VFR BLD CALC: 26.1 % (ref 34–48)
HCT VFR BLD CALC: 26.2 % (ref 34–48)
HCT VFR BLD CALC: 26.4 % (ref 34–48)
HCT VFR BLD CALC: 26.5 % (ref 34–48)
HCT VFR BLD CALC: 26.8 % (ref 34–48)
HCT VFR BLD CALC: 26.9 % (ref 34–48)
HCT VFR BLD CALC: 26.9 % (ref 34–48)
HCT VFR BLD CALC: 27.3 % (ref 34–48)
HCT VFR BLD CALC: 27.5 % (ref 34–48)
HCT VFR BLD CALC: 27.7 % (ref 34–48)
HCT VFR BLD CALC: 28 % (ref 34–48)
HCT VFR BLD CALC: 28 % (ref 34–48)
HCT VFR BLD CALC: 28.1 % (ref 34–48)
HCT VFR BLD CALC: 29.4 % (ref 34–48)
HCT VFR BLD CALC: 29.7 % (ref 34–48)
HCT VFR BLD CALC: 29.8 % (ref 34–48)
HCT VFR BLD CALC: 29.9 % (ref 34–48)
HCT VFR BLD CALC: 30.5 % (ref 34–48)
HCT VFR BLD CALC: 30.6 % (ref 34–48)
HCT VFR BLD CALC: 30.7 % (ref 34–48)
HCT VFR BLD CALC: 31 % (ref 34–48)
HCT VFR BLD CALC: 32.5 % (ref 34–48)
HCT VFR BLD CALC: 33.5 % (ref 34–48)
HCT VFR BLD CALC: 34 % (ref 34–48)
HCT VFR BLD CALC: 34.4 % (ref 34–48)
HCT VFR BLD CALC: 34.5 % (ref 34–48)
HCT VFR BLD CALC: 35 % (ref 34–48)
HCT VFR BLD CALC: 35.2 % (ref 34–48)
HCT VFR BLD CALC: 35.3 % (ref 34–48)
HCT VFR BLD CALC: 35.4 % (ref 34–48)
HCT VFR BLD CALC: 38.8 % (ref 34–48)
HCT VFR BLD CALC: 38.9 % (ref 34–48)
HCT VFR BLD CALC: 39.7 % (ref 34–48)
HCT VFR BLD CALC: 40.5 % (ref 34–48)
HCT VFR BLD CALC: 40.8 % (ref 34–48)
HCT VFR BLD CALC: 41.3 % (ref 34–48)
HEMOGLOBIN: 10.3 G/DL (ref 11.5–15.5)
HEMOGLOBIN: 10.4 G/DL (ref 11.5–15.5)
HEMOGLOBIN: 10.5 G/DL (ref 11.5–15.5)
HEMOGLOBIN: 10.6 G/DL (ref 11.5–15.5)
HEMOGLOBIN: 10.8 G/DL (ref 11.5–15.5)
HEMOGLOBIN: 10.8 G/DL (ref 11.5–15.5)
HEMOGLOBIN: 10.9 G/DL (ref 11.5–15.5)
HEMOGLOBIN: 11.1 G/DL (ref 11.5–15.5)
HEMOGLOBIN: 11.1 G/DL (ref 11.5–15.5)
HEMOGLOBIN: 11.9 G/DL (ref 11.5–15.5)
HEMOGLOBIN: 12.1 G/DL (ref 11.5–15.5)
HEMOGLOBIN: 12.8 G/DL (ref 11.5–15.5)
HEMOGLOBIN: 13 G/DL (ref 11.5–15.5)
HEMOGLOBIN: 6.7 G/DL (ref 11.5–15.5)
HEMOGLOBIN: 6.8 G/DL (ref 11.5–15.5)
HEMOGLOBIN: 6.8 G/DL (ref 11.5–15.5)
HEMOGLOBIN: 7.1 G/DL (ref 11.5–15.5)
HEMOGLOBIN: 7.3 G/DL (ref 11.5–15.5)
HEMOGLOBIN: 7.7 G/DL (ref 11.5–15.5)
HEMOGLOBIN: 7.8 G/DL (ref 11.5–15.5)
HEMOGLOBIN: 8 G/DL (ref 11.5–15.5)
HEMOGLOBIN: 8 G/DL (ref 11.5–15.5)
HEMOGLOBIN: 8.1 G/DL (ref 11.5–15.5)
HEMOGLOBIN: 8.2 G/DL (ref 11.5–15.5)
HEMOGLOBIN: 8.3 G/DL (ref 11.5–15.5)
HEMOGLOBIN: 8.4 G/DL (ref 11.5–15.5)
HEMOGLOBIN: 8.5 G/DL (ref 11.5–15.5)
HEMOGLOBIN: 8.5 G/DL (ref 11.5–15.5)
HEMOGLOBIN: 8.6 G/DL (ref 11.5–15.5)
HEMOGLOBIN: 8.6 G/DL (ref 11.5–15.5)
HEMOGLOBIN: 8.7 G/DL (ref 11.5–15.5)
HEMOGLOBIN: 8.9 G/DL (ref 11.5–15.5)
HEMOGLOBIN: 9 G/DL (ref 11.5–15.5)
HEMOGLOBIN: 9.3 G/DL (ref 11.5–15.5)
HEMOGLOBIN: 9.3 G/DL (ref 11.5–15.5)
HEMOGLOBIN: 9.4 G/DL (ref 11.5–15.5)
HEMOGLOBIN: 9.6 G/DL (ref 11.5–15.5)
HEMOGLOBIN: 9.6 G/DL (ref 11.5–15.5)
HEMOGLOBIN: 9.7 G/DL (ref 11.5–15.5)
HEPATITIS B CORE IGM ANTIBODY: NORMAL
HEPATITIS B SURFACE ANTIGEN INTERPRETATION: NORMAL
HEPATITIS C ANTIBODY INTERPRETATION: NORMAL
HHB: 11.7 % (ref 0–5)
HHB: 14.9 % (ref 0–5)
HHB: 2 % (ref 0–5)
HHB: 2 % (ref 0–5)
HHB: 3.3 % (ref 0–5)
HHB: 4.3 % (ref 0–5)
HHB: 4.4 % (ref 0–5)
HHB: 4.6 % (ref 0–5)
HHB: 4.8 % (ref 0–5)
HHB: 5.3 % (ref 0–5)
HHB: 5.8 % (ref 0–5)
HHB: 5.8 % (ref 0–5)
HHB: 6.4 % (ref 0–5)
HHB: 6.6 % (ref 0–5)
HHB: 7.3 % (ref 0–5)
HHB: 7.8 % (ref 0–5)
HHB: 8.2 % (ref 0–5)
HHB: 8.3 % (ref 0–5)
HHB: 9 % (ref 0–5)
HIV-1 AND HIV-2 ANTIBODIES: NORMAL
HYDROCODONE, URINE: <20 NG/ML
HYDROMORPHONE, URINE: <20 NG/ML
HYPOCHROMIA: ABNORMAL
HYPOCHROMIA: ABNORMAL
IMMATURE GRANULOCYTES #: 0.03 E9/L
IMMATURE GRANULOCYTES #: 0.04 E9/L
IMMATURE GRANULOCYTES #: 0.05 E9/L
IMMATURE GRANULOCYTES #: 0.06 E9/L
IMMATURE GRANULOCYTES #: 0.07 E9/L
IMMATURE GRANULOCYTES #: 0.07 E9/L
IMMATURE GRANULOCYTES #: 0.14 E9/L
IMMATURE GRANULOCYTES #: 0.14 E9/L
IMMATURE GRANULOCYTES #: 0.16 E9/L
IMMATURE GRANULOCYTES #: 0.19 E9/L
IMMATURE GRANULOCYTES #: 0.22 E9/L
IMMATURE GRANULOCYTES %: 0.5 % (ref 0–5)
IMMATURE GRANULOCYTES %: 0.9 % (ref 0–5)
IMMATURE GRANULOCYTES %: 1.2 % (ref 0–5)
IMMATURE GRANULOCYTES %: 1.3 % (ref 0–5)
IMMATURE GRANULOCYTES %: 1.7 % (ref 0–5)
IMMATURE GRANULOCYTES %: 1.7 % (ref 0–5)
IMMATURE GRANULOCYTES %: 1.8 % (ref 0–5)
IMMATURE GRANULOCYTES %: 2.4 % (ref 0–5)
IMMATURE GRANULOCYTES %: 2.8 % (ref 0–5)
IMMATURE GRANULOCYTES %: 2.9 % (ref 0–5)
IMMATURE GRANULOCYTES %: 3.9 % (ref 0–5)
IMMATURE GRANULOCYTES %: 4.3 % (ref 0–5)
IMMATURE GRANULOCYTES %: 4.8 % (ref 0–5)
IMMATURE RETIC FRACT: 25.5 % (ref 3–15.9)
INR BLD: 1
IRON SATURATION: 10 % (ref 15–50)
IRON: 24 MCG/DL (ref 37–145)
JAK2 GENE MUTATION QUAL: NOT DETECTED
KETONES, URINE: NEGATIVE MG/DL
L. PNEUMOPHILA SEROGP 1 UR AG: NORMAL
LAB: ABNORMAL
LACTATE DEHYDROGENASE: 222 U/L (ref 135–214)
LACTATE DEHYDROGENASE: 302 U/L (ref 135–214)
LACTIC ACID, SEPSIS: 1.9 MMOL/L (ref 0.5–1.9)
LACTIC ACID: 0.5 MMOL/L (ref 0.5–2.2)
LACTIC ACID: 0.9 MMOL/L (ref 0.5–2.2)
LACTIC ACID: 1.1 MMOL/L (ref 0.5–2.2)
LACTIC ACID: 1.5 MMOL/L (ref 0.5–2.2)
LACTIC ACID: 2 MMOL/L (ref 0.5–2.2)
LD, FLUID: 74 U/L
LEUKOCYTE ESTERASE, URINE: ABNORMAL
LEUKOCYTE ESTERASE, URINE: NEGATIVE
LEUKOCYTE ESTERASE, URINE: NEGATIVE
LIPASE: 9 U/L (ref 13–60)
LV EF: 60 %
LVEF MODALITY: NORMAL
LYMPHOCYTES ABSOLUTE: 0.02 E9/L (ref 1.5–4)
LYMPHOCYTES ABSOLUTE: 0.04 E9/L (ref 1.5–4)
LYMPHOCYTES ABSOLUTE: 0.05 E9/L (ref 1.5–4)
LYMPHOCYTES ABSOLUTE: 0.06 E9/L (ref 1.5–4)
LYMPHOCYTES ABSOLUTE: 0.06 E9/L (ref 1.5–4)
LYMPHOCYTES ABSOLUTE: 0.1 E9/L (ref 1.5–4)
LYMPHOCYTES ABSOLUTE: 0.13 E9/L (ref 1.5–4)
LYMPHOCYTES ABSOLUTE: 0.14 E9/L (ref 1.5–4)
LYMPHOCYTES ABSOLUTE: 0.15 E9/L (ref 1.5–4)
LYMPHOCYTES ABSOLUTE: 0.16 E9/L (ref 1.5–4)
LYMPHOCYTES ABSOLUTE: 0.18 E9/L (ref 1.5–4)
LYMPHOCYTES ABSOLUTE: 0.19 E9/L (ref 1.5–4)
LYMPHOCYTES ABSOLUTE: 0.19 E9/L (ref 1.5–4)
LYMPHOCYTES ABSOLUTE: 0.25 E9/L (ref 1.5–4)
LYMPHOCYTES ABSOLUTE: 0.3 E9/L (ref 1.5–4)
LYMPHOCYTES ABSOLUTE: 0.31 E9/L (ref 1.5–4)
LYMPHOCYTES ABSOLUTE: 0.32 E9/L (ref 1.5–4)
LYMPHOCYTES ABSOLUTE: 0.41 E9/L (ref 1.5–4)
LYMPHOCYTES ABSOLUTE: 0.46 E9/L (ref 1.5–4)
LYMPHOCYTES ABSOLUTE: 0.47 E9/L (ref 1.5–4)
LYMPHOCYTES ABSOLUTE: 0.6 E9/L (ref 1.5–4)
LYMPHOCYTES ABSOLUTE: 0.65 E9/L (ref 1.5–4)
LYMPHOCYTES ABSOLUTE: 0.71 E9/L (ref 1.5–4)
LYMPHOCYTES ABSOLUTE: 0.72 E9/L (ref 1.5–4)
LYMPHOCYTES ABSOLUTE: 0.75 E9/L (ref 1.5–4)
LYMPHOCYTES ABSOLUTE: 0.77 E9/L (ref 1.5–4)
LYMPHOCYTES ABSOLUTE: 1.18 E9/L (ref 1.5–4)
LYMPHOCYTES RELATIVE PERCENT: 0.9 % (ref 20–42)
LYMPHOCYTES RELATIVE PERCENT: 1.7 % (ref 20–42)
LYMPHOCYTES RELATIVE PERCENT: 1.8 % (ref 20–42)
LYMPHOCYTES RELATIVE PERCENT: 1.8 % (ref 20–42)
LYMPHOCYTES RELATIVE PERCENT: 10.2 % (ref 20–42)
LYMPHOCYTES RELATIVE PERCENT: 11 % (ref 20–42)
LYMPHOCYTES RELATIVE PERCENT: 11.5 % (ref 20–42)
LYMPHOCYTES RELATIVE PERCENT: 12.2 % (ref 20–42)
LYMPHOCYTES RELATIVE PERCENT: 12.2 % (ref 20–42)
LYMPHOCYTES RELATIVE PERCENT: 12.8 % (ref 20–42)
LYMPHOCYTES RELATIVE PERCENT: 18.1 % (ref 20–42)
LYMPHOCYTES RELATIVE PERCENT: 2.6 % (ref 20–42)
LYMPHOCYTES RELATIVE PERCENT: 2.7 % (ref 20–42)
LYMPHOCYTES RELATIVE PERCENT: 2.8 % (ref 20–42)
LYMPHOCYTES RELATIVE PERCENT: 25.2 % (ref 20–42)
LYMPHOCYTES RELATIVE PERCENT: 28.5 % (ref 20–42)
LYMPHOCYTES RELATIVE PERCENT: 3.1 % (ref 20–42)
LYMPHOCYTES RELATIVE PERCENT: 3.5 % (ref 20–42)
LYMPHOCYTES RELATIVE PERCENT: 3.7 % (ref 20–42)
LYMPHOCYTES RELATIVE PERCENT: 5.2 % (ref 20–42)
LYMPHOCYTES RELATIVE PERCENT: 6.7 % (ref 20–42)
LYMPHOCYTES RELATIVE PERCENT: 7 % (ref 20–42)
LYMPHOCYTES RELATIVE PERCENT: 8.2 % (ref 20–42)
LYMPHOCYTES RELATIVE PERCENT: 8.3 % (ref 20–42)
LYMPHOCYTES RELATIVE PERCENT: 8.8 % (ref 20–42)
LYMPHOCYTES, BODY FLUID: 0 %
Lab: ABNORMAL
Lab: NORMAL
Lab: NORMAL
MAGNESIUM: 1.8 MG/DL (ref 1.6–2.6)
MAGNESIUM: 1.9 MG/DL (ref 1.6–2.6)
MAGNESIUM: 2 MG/DL (ref 1.6–2.6)
MAGNESIUM: 2.1 MG/DL (ref 1.6–2.6)
MAGNESIUM: 2.2 MG/DL (ref 1.6–2.6)
MAGNESIUM: 2.2 MG/DL (ref 1.6–2.6)
MAGNESIUM: 2.3 MG/DL (ref 1.6–2.6)
MCH RBC QN AUTO: 28 PG (ref 26–35)
MCH RBC QN AUTO: 28.4 PG (ref 26–35)
MCH RBC QN AUTO: 28.6 PG (ref 26–35)
MCH RBC QN AUTO: 28.9 PG (ref 26–35)
MCH RBC QN AUTO: 29 PG (ref 26–35)
MCH RBC QN AUTO: 29.1 PG (ref 26–35)
MCH RBC QN AUTO: 29.1 PG (ref 26–35)
MCH RBC QN AUTO: 29.2 PG (ref 26–35)
MCH RBC QN AUTO: 29.3 PG (ref 26–35)
MCH RBC QN AUTO: 29.4 PG (ref 26–35)
MCH RBC QN AUTO: 29.4 PG (ref 26–35)
MCH RBC QN AUTO: 29.5 PG (ref 26–35)
MCH RBC QN AUTO: 29.6 PG (ref 26–35)
MCH RBC QN AUTO: 29.7 PG (ref 26–35)
MCH RBC QN AUTO: 29.8 PG (ref 26–35)
MCH RBC QN AUTO: 29.8 PG (ref 26–35)
MCH RBC QN AUTO: 29.9 PG (ref 26–35)
MCH RBC QN AUTO: 30 PG (ref 26–35)
MCH RBC QN AUTO: 30.1 PG (ref 26–35)
MCH RBC QN AUTO: 30.4 PG (ref 26–35)
MCHC RBC AUTO-ENTMCNC: 29.2 % (ref 32–34.5)
MCHC RBC AUTO-ENTMCNC: 29.3 % (ref 32–34.5)
MCHC RBC AUTO-ENTMCNC: 29.3 % (ref 32–34.5)
MCHC RBC AUTO-ENTMCNC: 29.4 % (ref 32–34.5)
MCHC RBC AUTO-ENTMCNC: 29.4 % (ref 32–34.5)
MCHC RBC AUTO-ENTMCNC: 29.6 % (ref 32–34.5)
MCHC RBC AUTO-ENTMCNC: 30 % (ref 32–34.5)
MCHC RBC AUTO-ENTMCNC: 30 % (ref 32–34.5)
MCHC RBC AUTO-ENTMCNC: 30.1 % (ref 32–34.5)
MCHC RBC AUTO-ENTMCNC: 30.2 % (ref 32–34.5)
MCHC RBC AUTO-ENTMCNC: 30.6 % (ref 32–34.5)
MCHC RBC AUTO-ENTMCNC: 30.7 % (ref 32–34.5)
MCHC RBC AUTO-ENTMCNC: 30.7 % (ref 32–34.5)
MCHC RBC AUTO-ENTMCNC: 30.8 % (ref 32–34.5)
MCHC RBC AUTO-ENTMCNC: 30.8 % (ref 32–34.5)
MCHC RBC AUTO-ENTMCNC: 30.9 % (ref 32–34.5)
MCHC RBC AUTO-ENTMCNC: 31 % (ref 32–34.5)
MCHC RBC AUTO-ENTMCNC: 31.1 % (ref 32–34.5)
MCHC RBC AUTO-ENTMCNC: 31.2 % (ref 32–34.5)
MCHC RBC AUTO-ENTMCNC: 31.2 % (ref 32–34.5)
MCHC RBC AUTO-ENTMCNC: 31.3 % (ref 32–34.5)
MCHC RBC AUTO-ENTMCNC: 31.4 % (ref 32–34.5)
MCHC RBC AUTO-ENTMCNC: 31.5 % (ref 32–34.5)
MCHC RBC AUTO-ENTMCNC: 31.7 % (ref 32–34.5)
MCHC RBC AUTO-ENTMCNC: 31.8 % (ref 32–34.5)
MCHC RBC AUTO-ENTMCNC: 31.9 % (ref 32–34.5)
MCHC RBC AUTO-ENTMCNC: 32 % (ref 32–34.5)
MCHC RBC AUTO-ENTMCNC: 32.2 % (ref 32–34.5)
MCV RBC AUTO: 100 FL (ref 80–99.9)
MCV RBC AUTO: 102.9 FL (ref 80–99.9)
MCV RBC AUTO: 91 FL (ref 80–99.9)
MCV RBC AUTO: 91.3 FL (ref 80–99.9)
MCV RBC AUTO: 91.5 FL (ref 80–99.9)
MCV RBC AUTO: 92.4 FL (ref 80–99.9)
MCV RBC AUTO: 92.7 FL (ref 80–99.9)
MCV RBC AUTO: 92.9 FL (ref 80–99.9)
MCV RBC AUTO: 93.1 FL (ref 80–99.9)
MCV RBC AUTO: 93.2 FL (ref 80–99.9)
MCV RBC AUTO: 93.6 FL (ref 80–99.9)
MCV RBC AUTO: 93.7 FL (ref 80–99.9)
MCV RBC AUTO: 94.3 FL (ref 80–99.9)
MCV RBC AUTO: 94.5 FL (ref 80–99.9)
MCV RBC AUTO: 94.7 FL (ref 80–99.9)
MCV RBC AUTO: 94.9 FL (ref 80–99.9)
MCV RBC AUTO: 94.9 FL (ref 80–99.9)
MCV RBC AUTO: 95.2 FL (ref 80–99.9)
MCV RBC AUTO: 95.3 FL (ref 80–99.9)
MCV RBC AUTO: 95.4 FL (ref 80–99.9)
MCV RBC AUTO: 95.6 FL (ref 80–99.9)
MCV RBC AUTO: 95.8 FL (ref 80–99.9)
MCV RBC AUTO: 95.8 FL (ref 80–99.9)
MCV RBC AUTO: 96.6 FL (ref 80–99.9)
MCV RBC AUTO: 97.1 FL (ref 80–99.9)
MCV RBC AUTO: 97.2 FL (ref 80–99.9)
MCV RBC AUTO: 97.3 FL (ref 80–99.9)
MCV RBC AUTO: 97.4 FL (ref 80–99.9)
MCV RBC AUTO: 98.2 FL (ref 80–99.9)
MCV RBC AUTO: 98.6 FL (ref 80–99.9)
MCV RBC AUTO: 99.3 FL (ref 80–99.9)
MCV RBC AUTO: 99.6 FL (ref 80–99.9)
METAMYELOCYTES RELATIVE PERCENT: 0.9 % (ref 0–1)
METAMYELOCYTES RELATIVE PERCENT: 1.7 % (ref 0–1)
METAMYELOCYTES RELATIVE PERCENT: 1.8 % (ref 0–1)
METAMYELOCYTES RELATIVE PERCENT: 2.6 % (ref 0–1)
METER GLUCOSE: 100 MG/DL (ref 74–99)
METER GLUCOSE: 103 MG/DL (ref 74–99)
METER GLUCOSE: 104 MG/DL (ref 74–99)
METER GLUCOSE: 105 MG/DL (ref 74–99)
METER GLUCOSE: 106 MG/DL (ref 74–99)
METER GLUCOSE: 107 MG/DL (ref 74–99)
METER GLUCOSE: 108 MG/DL (ref 74–99)
METER GLUCOSE: 109 MG/DL (ref 74–99)
METER GLUCOSE: 110 MG/DL (ref 74–99)
METER GLUCOSE: 116 MG/DL (ref 74–99)
METER GLUCOSE: 120 MG/DL (ref 74–99)
METER GLUCOSE: 122 MG/DL (ref 74–99)
METER GLUCOSE: 125 MG/DL (ref 74–99)
METER GLUCOSE: 128 MG/DL (ref 74–99)
METER GLUCOSE: 129 MG/DL (ref 74–99)
METER GLUCOSE: 129 MG/DL (ref 74–99)
METER GLUCOSE: 133 MG/DL (ref 74–99)
METER GLUCOSE: 134 MG/DL (ref 74–99)
METER GLUCOSE: 135 MG/DL (ref 74–99)
METER GLUCOSE: 136 MG/DL (ref 74–99)
METER GLUCOSE: 136 MG/DL (ref 74–99)
METER GLUCOSE: 142 MG/DL (ref 74–99)
METER GLUCOSE: 143 MG/DL (ref 74–99)
METER GLUCOSE: 144 MG/DL (ref 74–99)
METER GLUCOSE: 146 MG/DL (ref 74–99)
METER GLUCOSE: 148 MG/DL (ref 74–99)
METER GLUCOSE: 152 MG/DL (ref 74–99)
METER GLUCOSE: 153 MG/DL (ref 74–99)
METER GLUCOSE: 154 MG/DL (ref 74–99)
METER GLUCOSE: 154 MG/DL (ref 74–99)
METER GLUCOSE: 156 MG/DL (ref 74–99)
METER GLUCOSE: 160 MG/DL (ref 74–99)
METER GLUCOSE: 160 MG/DL (ref 74–99)
METER GLUCOSE: 162 MG/DL (ref 74–99)
METER GLUCOSE: 163 MG/DL (ref 74–99)
METER GLUCOSE: 164 MG/DL (ref 74–99)
METER GLUCOSE: 164 MG/DL (ref 74–99)
METER GLUCOSE: 165 MG/DL (ref 74–99)
METER GLUCOSE: 167 MG/DL (ref 74–99)
METER GLUCOSE: 169 MG/DL (ref 74–99)
METER GLUCOSE: 169 MG/DL (ref 74–99)
METER GLUCOSE: 170 MG/DL (ref 74–99)
METER GLUCOSE: 170 MG/DL (ref 74–99)
METER GLUCOSE: 172 MG/DL (ref 74–99)
METER GLUCOSE: 173 MG/DL (ref 74–99)
METER GLUCOSE: 177 MG/DL (ref 74–99)
METER GLUCOSE: 178 MG/DL (ref 74–99)
METER GLUCOSE: 181 MG/DL (ref 74–99)
METER GLUCOSE: 183 MG/DL (ref 74–99)
METER GLUCOSE: 183 MG/DL (ref 74–99)
METER GLUCOSE: 184 MG/DL (ref 74–99)
METER GLUCOSE: 188 MG/DL (ref 74–99)
METER GLUCOSE: 193 MG/DL (ref 74–99)
METER GLUCOSE: 193 MG/DL (ref 74–99)
METER GLUCOSE: 196 MG/DL (ref 74–99)
METER GLUCOSE: 198 MG/DL (ref 74–99)
METER GLUCOSE: 199 MG/DL (ref 74–99)
METER GLUCOSE: 199 MG/DL (ref 74–99)
METER GLUCOSE: 201 MG/DL (ref 74–99)
METER GLUCOSE: 203 MG/DL (ref 74–99)
METER GLUCOSE: 203 MG/DL (ref 74–99)
METER GLUCOSE: 209 MG/DL (ref 74–99)
METER GLUCOSE: 210 MG/DL (ref 74–99)
METER GLUCOSE: 212 MG/DL (ref 74–99)
METER GLUCOSE: 214 MG/DL (ref 74–99)
METER GLUCOSE: 215 MG/DL (ref 74–99)
METER GLUCOSE: 216 MG/DL (ref 74–99)
METER GLUCOSE: 217 MG/DL (ref 74–99)
METER GLUCOSE: 217 MG/DL (ref 74–99)
METER GLUCOSE: 223 MG/DL (ref 74–99)
METER GLUCOSE: 229 MG/DL (ref 74–99)
METER GLUCOSE: 232 MG/DL (ref 74–99)
METER GLUCOSE: 232 MG/DL (ref 74–99)
METER GLUCOSE: 238 MG/DL (ref 74–99)
METER GLUCOSE: 239 MG/DL (ref 74–99)
METER GLUCOSE: 252 MG/DL (ref 74–99)
METER GLUCOSE: 265 MG/DL (ref 74–99)
METER GLUCOSE: 268 MG/DL (ref 74–99)
METER GLUCOSE: 269 MG/DL (ref 74–99)
METER GLUCOSE: 285 MG/DL (ref 74–99)
METER GLUCOSE: 290 MG/DL (ref 74–99)
METER GLUCOSE: 331 MG/DL (ref 74–99)
METER GLUCOSE: 351 MG/DL (ref 74–99)
METER GLUCOSE: 63 MG/DL (ref 74–99)
METER GLUCOSE: 69 MG/DL (ref 74–99)
METER GLUCOSE: 73 MG/DL (ref 74–99)
METER GLUCOSE: 74 MG/DL (ref 74–99)
METER GLUCOSE: 76 MG/DL (ref 74–99)
METER GLUCOSE: 77 MG/DL (ref 74–99)
METER GLUCOSE: 78 MG/DL (ref 74–99)
METER GLUCOSE: 78 MG/DL (ref 74–99)
METER GLUCOSE: 80 MG/DL (ref 74–99)
METER GLUCOSE: 81 MG/DL (ref 74–99)
METER GLUCOSE: 82 MG/DL (ref 74–99)
METER GLUCOSE: 84 MG/DL (ref 74–99)
METER GLUCOSE: 85 MG/DL (ref 74–99)
METER GLUCOSE: 86 MG/DL (ref 74–99)
METER GLUCOSE: 93 MG/DL (ref 74–99)
METER GLUCOSE: 93 MG/DL (ref 74–99)
METER GLUCOSE: 96 MG/DL (ref 74–99)
METER GLUCOSE: 98 MG/DL (ref 74–99)
METHADONE SCREEN, URINE: NOT DETECTED
METHAMPHETAMINE, URINE: <200 NG/ML
METHB: 0.1 % (ref 0–1.5)
METHB: 0.2 % (ref 0–1.5)
METHB: 0.2 % (ref 0–1.5)
METHB: 0.3 % (ref 0–1.5)
METHB: 0.7 % (ref 0–1.5)
METHB: 0.8 % (ref 0–1.5)
METHB: 0.9 % (ref 0–1.5)
METHB: 1 % (ref 0–1.5)
METHYLENEDIOXYAMPHETAMINE, UR: <200 NG/ML
METHYLENEDIOXYETHYLAMPHETAMINE, UR: <200 NG/ML
METHYLENEDIOXYMETHAMPHETAMINE, UR: <200 NG/ML
METHYLMALONIC ACID: 0.95 UMOL/L (ref 0–0.4)
MICROALBUMIN UR-MCNC: 73.4 MG/L
MICROALBUMIN/CREAT UR-RTO: 183.5 (ref 0–30)
MODE: ABNORMAL
MODE: AC
MONOCYTE, FLUID: 3 %
MONOCYTE, FLUID: 75 %
MONOCYTES ABSOLUTE: 0 E9/L (ref 0.1–0.95)
MONOCYTES ABSOLUTE: 0.02 E9/L (ref 0.1–0.95)
MONOCYTES ABSOLUTE: 0.04 E9/L (ref 0.1–0.95)
MONOCYTES ABSOLUTE: 0.04 E9/L (ref 0.1–0.95)
MONOCYTES ABSOLUTE: 0.05 E9/L (ref 0.1–0.95)
MONOCYTES ABSOLUTE: 0.06 E9/L (ref 0.1–0.95)
MONOCYTES ABSOLUTE: 0.06 E9/L (ref 0.1–0.95)
MONOCYTES ABSOLUTE: 0.08 E9/L (ref 0.1–0.95)
MONOCYTES ABSOLUTE: 0.08 E9/L (ref 0.1–0.95)
MONOCYTES ABSOLUTE: 0.1 E9/L (ref 0.1–0.95)
MONOCYTES ABSOLUTE: 0.13 E9/L (ref 0.1–0.95)
MONOCYTES ABSOLUTE: 0.13 E9/L (ref 0.1–0.95)
MONOCYTES ABSOLUTE: 0.16 E9/L (ref 0.1–0.95)
MONOCYTES ABSOLUTE: 0.17 E9/L (ref 0.1–0.95)
MONOCYTES ABSOLUTE: 0.18 E9/L (ref 0.1–0.95)
MONOCYTES ABSOLUTE: 0.24 E9/L (ref 0.1–0.95)
MONOCYTES ABSOLUTE: 0.24 E9/L (ref 0.1–0.95)
MONOCYTES ABSOLUTE: 0.26 E9/L (ref 0.1–0.95)
MONOCYTES ABSOLUTE: 0.28 E9/L (ref 0.1–0.95)
MONOCYTES ABSOLUTE: 0.31 E9/L (ref 0.1–0.95)
MONOCYTES ABSOLUTE: 0.33 E9/L (ref 0.1–0.95)
MONOCYTES ABSOLUTE: 0.39 E9/L (ref 0.1–0.95)
MONOCYTES ABSOLUTE: 0.42 E9/L (ref 0.1–0.95)
MONOCYTES ABSOLUTE: 0.47 E9/L (ref 0.1–0.95)
MONOCYTES ABSOLUTE: 0.57 E9/L (ref 0.1–0.95)
MONOCYTES RELATIVE PERCENT: 0.9 % (ref 2–12)
MONOCYTES RELATIVE PERCENT: 1.4 % (ref 2–12)
MONOCYTES RELATIVE PERCENT: 1.7 % (ref 2–12)
MONOCYTES RELATIVE PERCENT: 1.8 % (ref 2–12)
MONOCYTES RELATIVE PERCENT: 1.8 % (ref 2–12)
MONOCYTES RELATIVE PERCENT: 10.4 % (ref 2–12)
MONOCYTES RELATIVE PERCENT: 10.8 % (ref 2–12)
MONOCYTES RELATIVE PERCENT: 11.1 % (ref 2–12)
MONOCYTES RELATIVE PERCENT: 2.6 % (ref 2–12)
MONOCYTES RELATIVE PERCENT: 2.8 % (ref 2–12)
MONOCYTES RELATIVE PERCENT: 3.1 % (ref 2–12)
MONOCYTES RELATIVE PERCENT: 3.5 % (ref 2–12)
MONOCYTES RELATIVE PERCENT: 3.5 % (ref 2–12)
MONOCYTES RELATIVE PERCENT: 3.6 % (ref 2–12)
MONOCYTES RELATIVE PERCENT: 3.7 % (ref 2–12)
MONOCYTES RELATIVE PERCENT: 5.7 % (ref 2–12)
MONOCYTES RELATIVE PERCENT: 5.7 % (ref 2–12)
MONOCYTES RELATIVE PERCENT: 6.1 % (ref 2–12)
MONOCYTES RELATIVE PERCENT: 6.1 % (ref 2–12)
MONOCYTES RELATIVE PERCENT: 6.7 % (ref 2–12)
MONOCYTES RELATIVE PERCENT: 6.8 % (ref 2–12)
MONOCYTES RELATIVE PERCENT: 7.9 % (ref 2–12)
MONOCYTES RELATIVE PERCENT: 8.5 % (ref 2–12)
MORPHINE URINE: 155 NG/ML
MRSA CULTURE ONLY: NORMAL
MRSA CULTURE ONLY: NORMAL
MYELOCYTE PERCENT: 0.9 % (ref 0–0)
MYELOCYTE PERCENT: 1.7 % (ref 0–0)
MYELOCYTE PERCENT: 1.8 % (ref 0–0)
MYELOCYTE PERCENT: 1.8 % (ref 0–0)
MYELOCYTE PERCENT: 3.5 % (ref 0–0)
NEUTROPHIL, FLUID: 25 %
NEUTROPHIL, FLUID: 97 %
NEUTROPHILS ABSOLUTE: 1.4 E9/L (ref 1.8–7.3)
NEUTROPHILS ABSOLUTE: 1.87 E9/L (ref 1.8–7.3)
NEUTROPHILS ABSOLUTE: 10.42 E9/L (ref 1.8–7.3)
NEUTROPHILS ABSOLUTE: 2.11 E9/L (ref 1.8–7.3)
NEUTROPHILS ABSOLUTE: 2.25 E9/L (ref 1.8–7.3)
NEUTROPHILS ABSOLUTE: 2.29 E9/L (ref 1.8–7.3)
NEUTROPHILS ABSOLUTE: 2.35 E9/L (ref 1.8–7.3)
NEUTROPHILS ABSOLUTE: 2.46 E9/L (ref 1.8–7.3)
NEUTROPHILS ABSOLUTE: 2.65 E9/L (ref 1.8–7.3)
NEUTROPHILS ABSOLUTE: 2.67 E9/L (ref 1.8–7.3)
NEUTROPHILS ABSOLUTE: 2.81 E9/L (ref 1.8–7.3)
NEUTROPHILS ABSOLUTE: 2.91 E9/L (ref 1.8–7.3)
NEUTROPHILS ABSOLUTE: 3.01 E9/L (ref 1.8–7.3)
NEUTROPHILS ABSOLUTE: 3.07 E9/L (ref 1.8–7.3)
NEUTROPHILS ABSOLUTE: 3.72 E9/L (ref 1.8–7.3)
NEUTROPHILS ABSOLUTE: 3.83 E9/L (ref 1.8–7.3)
NEUTROPHILS ABSOLUTE: 4.48 E9/L (ref 1.8–7.3)
NEUTROPHILS ABSOLUTE: 4.55 E9/L (ref 1.8–7.3)
NEUTROPHILS ABSOLUTE: 4.56 E9/L (ref 1.8–7.3)
NEUTROPHILS ABSOLUTE: 4.66 E9/L (ref 1.8–7.3)
NEUTROPHILS ABSOLUTE: 4.74 E9/L (ref 1.8–7.3)
NEUTROPHILS ABSOLUTE: 4.75 E9/L (ref 1.8–7.3)
NEUTROPHILS ABSOLUTE: 5.31 E9/L (ref 1.8–7.3)
NEUTROPHILS ABSOLUTE: 5.33 E9/L (ref 1.8–7.3)
NEUTROPHILS ABSOLUTE: 6.05 E9/L (ref 1.8–7.3)
NEUTROPHILS ABSOLUTE: 6.25 E9/L (ref 1.8–7.3)
NEUTROPHILS ABSOLUTE: 7.57 E9/L (ref 1.8–7.3)
NEUTROPHILS RELATIVE PERCENT: 55.2 % (ref 43–80)
NEUTROPHILS RELATIVE PERCENT: 55.7 % (ref 43–80)
NEUTROPHILS RELATIVE PERCENT: 68.3 % (ref 43–80)
NEUTROPHILS RELATIVE PERCENT: 74.1 % (ref 43–80)
NEUTROPHILS RELATIVE PERCENT: 77.7 % (ref 43–80)
NEUTROPHILS RELATIVE PERCENT: 78.2 % (ref 43–80)
NEUTROPHILS RELATIVE PERCENT: 78.4 % (ref 43–80)
NEUTROPHILS RELATIVE PERCENT: 78.4 % (ref 43–80)
NEUTROPHILS RELATIVE PERCENT: 82.7 % (ref 43–80)
NEUTROPHILS RELATIVE PERCENT: 85.1 % (ref 43–80)
NEUTROPHILS RELATIVE PERCENT: 85.8 % (ref 43–80)
NEUTROPHILS RELATIVE PERCENT: 86.2 % (ref 43–80)
NEUTROPHILS RELATIVE PERCENT: 87.8 % (ref 43–80)
NEUTROPHILS RELATIVE PERCENT: 88.3 % (ref 43–80)
NEUTROPHILS RELATIVE PERCENT: 88.6 % (ref 43–80)
NEUTROPHILS RELATIVE PERCENT: 88.7 % (ref 43–80)
NEUTROPHILS RELATIVE PERCENT: 89.5 % (ref 43–80)
NEUTROPHILS RELATIVE PERCENT: 90.4 % (ref 43–80)
NEUTROPHILS RELATIVE PERCENT: 92.6 % (ref 43–80)
NEUTROPHILS RELATIVE PERCENT: 93.7 % (ref 43–80)
NEUTROPHILS RELATIVE PERCENT: 93.9 % (ref 43–80)
NEUTROPHILS RELATIVE PERCENT: 93.9 % (ref 43–80)
NEUTROPHILS RELATIVE PERCENT: 94.8 % (ref 43–80)
NEUTROPHILS RELATIVE PERCENT: 95.6 % (ref 43–80)
NEUTROPHILS RELATIVE PERCENT: 95.7 % (ref 43–80)
NEUTROPHILS RELATIVE PERCENT: 95.7 % (ref 43–80)
NEUTROPHILS RELATIVE PERCENT: 97.4 % (ref 43–80)
NITRITE, URINE: NEGATIVE
NITRITE, URINE: NEGATIVE
NITRITE, URINE: POSITIVE
NORHYDROCODONE, URINE: <20 NG/ML
NOROXYCODONE, URINE: <20 NG/ML
NOROXYMORPHONE, URINE: <20 NG/ML
NUCLEATED CELLS FLUID: 115 /UL
NUCLEATED CELLS FLUID: 178 /UL
NUCLEATED RED BLOOD CELLS: 0.9 /100 WBC
NUCLEATED RED BLOOD CELLS: 1.8 /100 WBC
O2 CONTENT: 10.9 ML/DL
O2 CONTENT: 10.9 ML/DL
O2 CONTENT: 11.1 ML/DL
O2 CONTENT: 11.2 ML/DL
O2 CONTENT: 11.3 ML/DL
O2 CONTENT: 11.5 ML/DL
O2 CONTENT: 11.8 ML/DL
O2 CONTENT: 12.3 ML/DL
O2 CONTENT: 12.6 ML/DL
O2 CONTENT: 13.5 ML/DL
O2 CONTENT: 13.7 ML/DL
O2 CONTENT: 14.4 ML/DL
O2 CONTENT: 14.9 ML/DL
O2 CONTENT: 15.1 ML/DL
O2 CONTENT: 15.3 ML/DL
O2 CONTENT: 15.6 ML/DL
O2 CONTENT: 15.7 ML/DL
O2 CONTENT: 15.8 ML/DL
O2 CONTENT: 9.4 ML/DL
O2 SATURATION: 84.9 % (ref 92–98.5)
O2 SATURATION: 88.1 % (ref 92–98.5)
O2 SATURATION: 90.8 % (ref 92–98.5)
O2 SATURATION: 91.6 % (ref 92–98.5)
O2 SATURATION: 91.7 % (ref 92–98.5)
O2 SATURATION: 92.1 % (ref 92–98.5)
O2 SATURATION: 92.6 % (ref 92–98.5)
O2 SATURATION: 93.3 % (ref 92–98.5)
O2 SATURATION: 93.4 % (ref 92–98.5)
O2 SATURATION: 94.1 % (ref 92–98.5)
O2 SATURATION: 94.1 % (ref 92–98.5)
O2 SATURATION: 94.6 % (ref 92–98.5)
O2 SATURATION: 95.1 % (ref 92–98.5)
O2 SATURATION: 95.3 % (ref 92–98.5)
O2 SATURATION: 95.5 % (ref 92–98.5)
O2 SATURATION: 95.6 % (ref 92–98.5)
O2 SATURATION: 96.7 % (ref 92–98.5)
O2 SATURATION: 98 % (ref 92–98.5)
O2 SATURATION: 98 % (ref 92–98.5)
O2HB: 84 % (ref 94–97)
O2HB: 86.9 % (ref 94–97)
O2HB: 89 % (ref 94–97)
O2HB: 90.4 % (ref 94–97)
O2HB: 90.6 % (ref 94–97)
O2HB: 91 % (ref 94–97)
O2HB: 91.1 % (ref 94–97)
O2HB: 91.4 % (ref 94–97)
O2HB: 91.9 % (ref 94–97)
O2HB: 92.2 % (ref 94–97)
O2HB: 92.7 % (ref 94–97)
O2HB: 93.1 % (ref 94–97)
O2HB: 93.3 % (ref 94–97)
O2HB: 93.6 % (ref 94–97)
O2HB: 93.8 % (ref 94–97)
O2HB: 94.4 % (ref 94–97)
O2HB: 95.3 % (ref 94–97)
O2HB: 96 % (ref 94–97)
O2HB: 96.4 % (ref 94–97)
OPERATOR ID: 1023
OPERATOR ID: 1394
OPERATOR ID: 1868
OPERATOR ID: 1874
OPERATOR ID: 1926
OPERATOR ID: 1926
OPERATOR ID: 2464
OPERATOR ID: 2577
OPERATOR ID: 2962
OPERATOR ID: 3114
OPERATOR ID: 7490
OPERATOR ID: ABNORMAL
OPIATE SCREEN URINE: POSITIVE
ORGANISM: ABNORMAL
OSMOLALITY URINE: 481 MOSM/KG (ref 300–900)
OSMOLALITY: 311 MOSM/KG (ref 285–310)
OVALOCYTES: ABNORMAL
OXYCODONE, URINE CONFIRMATION: <20 NG/ML
OXYMORPHONE, URINE: <20 NG/ML
PATHOLOGIST REVIEW: NORMAL
PATIENT TEMP: 37 C
PCO2: 35.5 MMHG (ref 35–45)
PCO2: 36.3 MMHG (ref 35–45)
PCO2: 39.1 MMHG (ref 35–45)
PCO2: 40.4 MMHG (ref 35–45)
PCO2: 42.5 MMHG (ref 35–45)
PCO2: 43.5 MMHG (ref 35–45)
PCO2: 44.2 MMHG (ref 35–45)
PCO2: 44.6 MMHG (ref 35–45)
PCO2: 45.4 MMHG (ref 35–45)
PCO2: 45.6 MMHG (ref 35–45)
PCO2: 48.3 MMHG (ref 35–45)
PCO2: 48.5 MMHG (ref 35–45)
PCO2: 49.6 MMHG (ref 35–45)
PCO2: 49.8 MMHG (ref 35–45)
PCO2: 52.2 MMHG (ref 35–45)
PCO2: 58.2 MMHG (ref 35–45)
PCO2: 60.4 MMHG (ref 35–45)
PCO2: 62.1 MMHG (ref 35–45)
PCO2: 71.7 MMHG (ref 35–45)
PDW BLD-RTO: 14.7 FL (ref 11.5–15)
PDW BLD-RTO: 14.8 FL (ref 11.5–15)
PDW BLD-RTO: 14.9 FL (ref 11.5–15)
PDW BLD-RTO: 15 FL (ref 11.5–15)
PDW BLD-RTO: 15 FL (ref 11.5–15)
PDW BLD-RTO: 15.1 FL (ref 11.5–15)
PDW BLD-RTO: 15.5 FL (ref 11.5–15)
PDW BLD-RTO: 16.1 FL (ref 11.5–15)
PDW BLD-RTO: 16.2 FL (ref 11.5–15)
PDW BLD-RTO: 16.2 FL (ref 11.5–15)
PDW BLD-RTO: 16.3 FL (ref 11.5–15)
PDW BLD-RTO: 16.4 FL (ref 11.5–15)
PDW BLD-RTO: 16.7 FL (ref 11.5–15)
PDW BLD-RTO: 16.7 FL (ref 11.5–15)
PDW BLD-RTO: 16.8 FL (ref 11.5–15)
PDW BLD-RTO: 16.8 FL (ref 11.5–15)
PDW BLD-RTO: 17 FL (ref 11.5–15)
PDW BLD-RTO: 17.2 FL (ref 11.5–15)
PDW BLD-RTO: 17.5 FL (ref 11.5–15)
PDW BLD-RTO: 17.6 FL (ref 11.5–15)
PDW BLD-RTO: 17.7 FL (ref 11.5–15)
PDW BLD-RTO: 17.7 FL (ref 11.5–15)
PDW BLD-RTO: 17.9 FL (ref 11.5–15)
PDW BLD-RTO: 18 FL (ref 11.5–15)
PDW BLD-RTO: 18 FL (ref 11.5–15)
PDW BLD-RTO: 18.1 FL (ref 11.5–15)
PDW BLD-RTO: 18.2 FL (ref 11.5–15)
PDW BLD-RTO: 18.5 FL (ref 11.5–15)
PDW BLD-RTO: 18.9 FL (ref 11.5–15)
PDW BLD-RTO: 18.9 FL (ref 11.5–15)
PDW BLD-RTO: 19.1 FL (ref 11.5–15)
PEEP/CPAP: 13 CMH2O
PEEP/CPAP: 5 CMH2O
PEEP/CPAP: 6 CMH2O
PEEP/CPAP: 8 CMH2O
PERFORMED ON: ABNORMAL
PFO2: 1.04 MMHG/%
PFO2: 1.09 MMHG/%
PFO2: 1.22 MMHG/%
PFO2: 1.22 MMHG/%
PFO2: 1.31 MMHG/%
PFO2: 1.39 MMHG/%
PFO2: 1.42 MMHG/%
PFO2: 1.46 MMHG/%
PFO2: 1.58 MMHG/%
PFO2: 1.61 MMHG/%
PFO2: 1.62 MMHG/%
PFO2: 1.68 MMHG/%
PFO2: 1.75 MMHG/%
PFO2: 2.07 MMHG/%
PFO2: 2.38 MMHG/%
PH BLOOD GAS: 7.18 (ref 7.35–7.45)
PH BLOOD GAS: 7.2 (ref 7.35–7.45)
PH BLOOD GAS: 7.21 (ref 7.35–7.45)
PH BLOOD GAS: 7.22 (ref 7.35–7.45)
PH BLOOD GAS: 7.26 (ref 7.35–7.45)
PH BLOOD GAS: 7.29 (ref 7.35–7.45)
PH BLOOD GAS: 7.31 (ref 7.35–7.45)
PH BLOOD GAS: 7.34 (ref 7.35–7.45)
PH BLOOD GAS: 7.36 (ref 7.35–7.45)
PH BLOOD GAS: 7.37 (ref 7.35–7.45)
PH BLOOD GAS: 7.38 (ref 7.35–7.45)
PH BLOOD GAS: 7.4 (ref 7.35–7.45)
PH BLOOD GAS: 7.41 (ref 7.35–7.45)
PH BLOOD GAS: 7.42 (ref 7.35–7.45)
PH BLOOD GAS: 7.43 (ref 7.35–7.45)
PH BLOOD GAS: 7.47 (ref 7.35–7.45)
PH BLOOD GAS: 7.51 (ref 7.35–7.45)
PH UA: 5 (ref 5–9)
PH UA: 5.5 (ref 5–9)
PH UA: 5.5 (ref 5–9)
PH, BODY FLUID: 7.57
PHENCYCLIDINE SCREEN URINE: NOT DETECTED
PHOSPHORUS: 2.6 MG/DL (ref 2.5–4.5)
PHOSPHORUS: 2.6 MG/DL (ref 2.5–4.5)
PHOSPHORUS: 2.7 MG/DL (ref 2.5–4.5)
PHOSPHORUS: 2.8 MG/DL (ref 2.5–4.5)
PHOSPHORUS: 2.9 MG/DL (ref 2.5–4.5)
PHOSPHORUS: 3.4 MG/DL (ref 2.5–4.5)
PHOSPHORUS: 4.1 MG/DL (ref 2.5–4.5)
PHOSPHORUS: 4.8 MG/DL (ref 2.5–4.5)
PHOSPHORUS: 4.9 MG/DL (ref 2.5–4.5)
PIP: 12 CMH2O
PIP: 14 CMH2O
PIP: 15 CMH2O
PLATELET # BLD: 106 E9/L (ref 130–450)
PLATELET # BLD: 107 E9/L (ref 130–450)
PLATELET # BLD: 130 E9/L (ref 130–450)
PLATELET # BLD: 131 E9/L (ref 130–450)
PLATELET # BLD: 131 E9/L (ref 130–450)
PLATELET # BLD: 136 E9/L (ref 130–450)
PLATELET # BLD: 140 E9/L (ref 130–450)
PLATELET # BLD: 141 E9/L (ref 130–450)
PLATELET # BLD: 142 E9/L (ref 130–450)
PLATELET # BLD: 144 E9/L (ref 130–450)
PLATELET # BLD: 148 E9/L (ref 130–450)
PLATELET # BLD: 149 E9/L (ref 130–450)
PLATELET # BLD: 152 E9/L (ref 130–450)
PLATELET # BLD: 157 E9/L (ref 130–450)
PLATELET # BLD: 159 E9/L (ref 130–450)
PLATELET # BLD: 159 E9/L (ref 130–450)
PLATELET # BLD: 166 E9/L (ref 130–450)
PLATELET # BLD: 172 E9/L (ref 130–450)
PLATELET # BLD: 173 E9/L (ref 130–450)
PLATELET # BLD: 173 E9/L (ref 130–450)
PLATELET # BLD: 176 E9/L (ref 130–450)
PLATELET # BLD: 177 E9/L (ref 130–450)
PLATELET # BLD: 180 E9/L (ref 130–450)
PLATELET # BLD: 185 E9/L (ref 130–450)
PLATELET # BLD: 188 E9/L (ref 130–450)
PLATELET # BLD: 189 E9/L (ref 130–450)
PLATELET # BLD: 190 E9/L (ref 130–450)
PLATELET # BLD: 208 E9/L (ref 130–450)
PLATELET # BLD: 398 E9/L (ref 130–450)
PLATELET # BLD: 80 E9/L (ref 130–450)
PLATELET # BLD: 84 E9/L (ref 130–450)
PLATELET # BLD: 85 E9/L (ref 130–450)
PLATELET # BLD: 88 E9/L (ref 130–450)
PLATELET # BLD: 89 E9/L (ref 130–450)
PLATELET CONFIRMATION: NORMAL
PMV BLD AUTO: 10 FL (ref 7–12)
PMV BLD AUTO: 10.1 FL (ref 7–12)
PMV BLD AUTO: 10.2 FL (ref 7–12)
PMV BLD AUTO: 10.3 FL (ref 7–12)
PMV BLD AUTO: 10.3 FL (ref 7–12)
PMV BLD AUTO: 10.4 FL (ref 7–12)
PMV BLD AUTO: 10.6 FL (ref 7–12)
PMV BLD AUTO: 9.2 FL (ref 7–12)
PMV BLD AUTO: 9.5 FL (ref 7–12)
PMV BLD AUTO: 9.5 FL (ref 7–12)
PMV BLD AUTO: 9.6 FL (ref 7–12)
PMV BLD AUTO: 9.6 FL (ref 7–12)
PMV BLD AUTO: 9.7 FL (ref 7–12)
PMV BLD AUTO: 9.8 FL (ref 7–12)
PMV BLD AUTO: 9.9 FL (ref 7–12)
PMV BLD AUTO: 9.9 FL (ref 7–12)
PO2: 100.1 MMHG (ref 60–100)
PO2: 104.2 MMHG (ref 60–100)
PO2: 129.4 MMHG (ref 60–100)
PO2: 142.5 MMHG (ref 60–100)
PO2: 54.7 MMHG (ref 60–100)
PO2: 57 MMHG (ref 60–100)
PO2: 65.5 MMHG (ref 60–100)
PO2: 69.6 MMHG (ref 60–100)
PO2: 70.2 MMHG (ref 60–100)
PO2: 71.4 MMHG (ref 60–100)
PO2: 72.9 MMHG (ref 60–100)
PO2: 73.3 MMHG (ref 60–100)
PO2: 73.3 MMHG (ref 60–100)
PO2: 75.3 MMHG (ref 60–100)
PO2: 78.9 MMHG (ref 60–100)
PO2: 80.6 MMHG (ref 60–100)
PO2: 82.8 MMHG (ref 60–100)
PO2: 83.2 MMHG (ref 60–100)
PO2: 84.1 MMHG (ref 60–100)
POC CHLORIDE: 107 MMOL/L (ref 100–108)
POC CREATININE: 1.8 MG/DL (ref 0.5–1)
POC POTASSIUM: 5 MMOL/L (ref 3.5–5)
POC SODIUM: 143 MMOL/L (ref 132–146)
POIKILOCYTES: ABNORMAL
POLYCHROMASIA: ABNORMAL
POTASSIUM REFLEX MAGNESIUM: 3.4 MMOL/L (ref 3.5–5)
POTASSIUM REFLEX MAGNESIUM: 3.7 MMOL/L (ref 3.5–5)
POTASSIUM REFLEX MAGNESIUM: 3.9 MMOL/L (ref 3.5–5)
POTASSIUM REFLEX MAGNESIUM: 4 MMOL/L (ref 3.5–5)
POTASSIUM REFLEX MAGNESIUM: 4.1 MMOL/L (ref 3.5–5)
POTASSIUM REFLEX MAGNESIUM: 4.2 MMOL/L (ref 3.5–5)
POTASSIUM REFLEX MAGNESIUM: 4.2 MMOL/L (ref 3.5–5)
POTASSIUM REFLEX MAGNESIUM: 4.3 MMOL/L (ref 3.5–5)
POTASSIUM REFLEX MAGNESIUM: 4.4 MMOL/L (ref 3.5–5)
POTASSIUM REFLEX MAGNESIUM: 4.4 MMOL/L (ref 3.5–5)
POTASSIUM REFLEX MAGNESIUM: 4.5 MMOL/L (ref 3.5–5)
POTASSIUM REFLEX MAGNESIUM: 4.8 MMOL/L (ref 3.5–5)
POTASSIUM REFLEX MAGNESIUM: 4.9 MMOL/L (ref 3.5–5)
POTASSIUM REFLEX MAGNESIUM: 5.1 MMOL/L (ref 3.5–5)
POTASSIUM REFLEX MAGNESIUM: 5.3 MMOL/L (ref 3.5–5)
POTASSIUM REFLEX MAGNESIUM: 5.4 MMOL/L (ref 3.5–5)
POTASSIUM REFLEX MAGNESIUM: 6 MMOL/L (ref 3.5–5)
POTASSIUM SERPL-SCNC: 3.9 MMOL/L (ref 3.5–5)
POTASSIUM SERPL-SCNC: 4 MMOL/L (ref 3.5–5)
POTASSIUM SERPL-SCNC: 4.1 MMOL/L (ref 3.5–5)
POTASSIUM SERPL-SCNC: 4.3 MMOL/L (ref 3.5–5)
POTASSIUM SERPL-SCNC: 4.4 MMOL/L (ref 3.5–5)
POTASSIUM SERPL-SCNC: 4.5 MMOL/L (ref 3.5–5)
POTASSIUM SERPL-SCNC: 4.5 MMOL/L (ref 3.5–5)
POTASSIUM SERPL-SCNC: 4.6 MMOL/L (ref 3.5–5)
POTASSIUM SERPL-SCNC: 4.7 MMOL/L (ref 3.5–5)
POTASSIUM SERPL-SCNC: 4.7 MMOL/L (ref 3.5–5)
POTASSIUM SERPL-SCNC: 5.4 MMOL/L (ref 3.5–5)
POTASSIUM SERPL-SCNC: 5.6 MMOL/L (ref 3.5–5)
POTASSIUM SERPL-SCNC: 5.8 MMOL/L (ref 3.5–5)
POTASSIUM SERPL-SCNC: 5.8 MMOL/L (ref 3.5–5)
POTASSIUM, UR: 18.4 MMOL/L
POTASSIUM, UR: 21.8 MMOL/L
POTASSIUM, UR: 37.6 MMOL/L
PREALBUMIN: 15 MG/DL (ref 20–40)
PRO-BNP: ABNORMAL PG/ML (ref 0–450)
PROCALCITONIN: 0.04 NG/ML (ref 0–0.08)
PROCALCITONIN: 0.19 NG/ML (ref 0–0.08)
PROCALCITONIN: 0.25 NG/ML (ref 0–0.08)
PROMYELOCYTES PERCENT: 0.9 % (ref 0–0)
PROPOXYPHENE SCREEN: NOT DETECTED
PROTEIN FLUID: 1.6 G/DL
PROTEIN PROTEIN: 16 MG/DL (ref 0–12)
PROTEIN PROTEIN: 17 MG/DL (ref 0–12)
PROTEIN UA: 100 MG/DL
PROTEIN UA: 100 MG/DL
PROTEIN UA: NEGATIVE MG/DL
PROTEIN/CREAT RATIO: 0.4
PROTEIN/CREAT RATIO: 0.4
PROTEIN/CREAT RATIO: 0.4 (ref 0–0.2)
PROTEIN/CREAT RATIO: 0.4 (ref 0–0.2)
PROTHROMBIN TIME: 11.4 SEC (ref 9.3–12.4)
PROTHROMBIN TIME: 12 SEC (ref 9.3–12.4)
PROTHROMBIN TIME: 12.1 SEC (ref 9.3–12.4)
RBC # BLD: 2.27 E12/L (ref 3.5–5.5)
RBC # BLD: 2.43 E12/L (ref 3.5–5.5)
RBC # BLD: 2.49 E12/L (ref 3.5–5.5)
RBC # BLD: 2.63 E12/L (ref 3.5–5.5)
RBC # BLD: 2.63 E12/L (ref 3.5–5.5)
RBC # BLD: 2.67 E12/L (ref 3.5–5.5)
RBC # BLD: 2.7 E12/L (ref 3.5–5.5)
RBC # BLD: 2.73 E12/L (ref 3.5–5.5)
RBC # BLD: 2.73 E12/L (ref 3.5–5.5)
RBC # BLD: 2.75 E12/L (ref 3.5–5.5)
RBC # BLD: 2.77 E12/L (ref 3.5–5.5)
RBC # BLD: 2.82 E12/L (ref 3.5–5.5)
RBC # BLD: 2.85 E12/L (ref 3.5–5.5)
RBC # BLD: 2.88 E12/L (ref 3.5–5.5)
RBC # BLD: 2.93 E12/L (ref 3.5–5.5)
RBC # BLD: 2.94 E12/L (ref 3.5–5.5)
RBC # BLD: 3.11 E12/L (ref 3.5–5.5)
RBC # BLD: 3.15 E12/L (ref 3.5–5.5)
RBC # BLD: 3.28 E12/L (ref 3.5–5.5)
RBC # BLD: 3.31 E12/L (ref 3.5–5.5)
RBC # BLD: 3.31 E12/L (ref 3.5–5.5)
RBC # BLD: 3.34 E12/L (ref 3.5–5.5)
RBC # BLD: 3.42 E12/L (ref 3.5–5.5)
RBC # BLD: 3.55 E12/L (ref 3.5–5.5)
RBC # BLD: 3.63 E12/L (ref 3.5–5.5)
RBC # BLD: 3.68 E12/L (ref 3.5–5.5)
RBC # BLD: 3.69 E12/L (ref 3.5–5.5)
RBC # BLD: 3.7 E12/L (ref 3.5–5.5)
RBC # BLD: 3.71 E12/L (ref 3.5–5.5)
RBC # BLD: 3.74 E12/L (ref 3.5–5.5)
RBC # BLD: 4.05 E12/L (ref 3.5–5.5)
RBC # BLD: 4.08 E12/L (ref 3.5–5.5)
RBC # BLD: 4.11 E12/L (ref 3.5–5.5)
RBC # BLD: 4.16 E12/L (ref 3.5–5.5)
RBC # BLD: 4.27 E12/L (ref 3.5–5.5)
RBC # BLD: 4.37 E12/L (ref 3.5–5.5)
RBC FLUID: <2000 /UL
RBC FLUID: <2000 /UL
RBC UA: ABNORMAL /HPF (ref 0–2)
REASON FOR REJECTION: NORMAL
REJECTED TEST: NORMAL
RENIN ACTIVITY: 10.1 NG/ML/HR
REPORT: NORMAL
REPORT: NORMAL
RETIC HGB EQUIVALENT: 31.7 PG (ref 28.2–36.6)
RETICULOCYTE ABSOLUTE COUNT: 0.14 E12/L
RETICULOCYTE COUNT PCT: 5.2 % (ref 0.4–1.9)
RHEUMATOID FACTOR: 10 IU/ML (ref 0–13)
RI(T): 1.56
RI(T): 146 %
RI(T): 2.1
RI(T): 2.48
RI(T): 2.71
RI(T): 2.76
RI(T): 2.85
RI(T): 2.85
RI(T): 3.17
RI(T): 3.58
RI(T): 3.85
RI(T): 3.91
RI(T): 4.39
RI(T): 5.02
RR MECHANICAL: 14 B/MIN
RR MECHANICAL: 16 B/MIN
RR MECHANICAL: 18 B/MIN
SCHISTOCYTES: ABNORMAL
SMEAR, RESPIRATORY: ABNORMAL
SMEAR, RESPIRATORY: NORMAL
SODIUM BLD-SCNC: 132 MMOL/L (ref 132–146)
SODIUM BLD-SCNC: 134 MMOL/L (ref 132–146)
SODIUM BLD-SCNC: 135 MMOL/L (ref 132–146)
SODIUM BLD-SCNC: 136 MMOL/L (ref 132–146)
SODIUM BLD-SCNC: 137 MMOL/L (ref 132–146)
SODIUM BLD-SCNC: 138 MMOL/L (ref 132–146)
SODIUM BLD-SCNC: 139 MMOL/L (ref 132–146)
SODIUM BLD-SCNC: 140 MMOL/L (ref 132–146)
SODIUM BLD-SCNC: 140 MMOL/L (ref 132–146)
SODIUM BLD-SCNC: 141 MMOL/L (ref 132–146)
SODIUM BLD-SCNC: 142 MMOL/L (ref 132–146)
SODIUM BLD-SCNC: 142 MMOL/L (ref 132–146)
SODIUM BLD-SCNC: 143 MMOL/L (ref 132–146)
SODIUM BLD-SCNC: 145 MMOL/L (ref 132–146)
SODIUM BLD-SCNC: 145 MMOL/L (ref 132–146)
SODIUM URINE: 74 MMOL/L
SODIUM URINE: 84 MMOL/L
SODIUM URINE: <20 MMOL/L
SOURCE, BLOOD GAS: ABNORMAL
SPECIFIC GRAVITY UA: 1.02 (ref 1–1.03)
SPECIFIC GRAVITY UA: 1.02 (ref 1–1.03)
SPECIFIC GRAVITY UA: >=1.03 (ref 1–1.03)
STREP PNEUMONIAE ANTIGEN, URINE: NORMAL
TARGET CELLS: ABNORMAL
TARGET CELLS: ABNORMAL
TEAR DROP CELLS: ABNORMAL
THB: 10.5 G/DL (ref 11.5–16.5)
THB: 10.9 G/DL (ref 11.5–16.5)
THB: 11 G/DL (ref 11.5–16.5)
THB: 11.5 G/DL (ref 11.5–16.5)
THB: 11.8 G/DL (ref 11.5–16.5)
THB: 12.1 G/DL (ref 11.5–16.5)
THB: 12.2 G/DL (ref 11.5–16.5)
THB: 12.4 G/DL (ref 11.5–16.5)
THB: 7.9 G/DL (ref 11.5–16.5)
THB: 8.2 G/DL (ref 11.5–16.5)
THB: 8.5 G/DL (ref 11.5–16.5)
THB: 8.6 G/DL (ref 11.5–16.5)
THB: 8.6 G/DL (ref 11.5–16.5)
THB: 8.7 G/DL (ref 11.5–16.5)
THB: 8.9 G/DL (ref 11.5–16.5)
THB: 9.1 G/DL (ref 11.5–16.5)
THB: 9.1 G/DL (ref 11.5–16.5)
THB: 9.2 G/DL (ref 11.5–16.5)
THB: 9.9 G/DL (ref 11.5–16.5)
THIS TEST SENT TO: NORMAL
THIS TEST SENT TO: NORMAL
TIME ANALYZED: 1025
TIME ANALYZED: 1318
TIME ANALYZED: 14
TIME ANALYZED: 1551
TIME ANALYZED: 1934
TIME ANALYZED: 2035
TIME ANALYZED: 2202
TIME ANALYZED: 241
TIME ANALYZED: 406
TIME ANALYZED: 457
TIME ANALYZED: 507
TIME ANALYZED: 519
TIME ANALYZED: 520
TIME ANALYZED: 552
TIME ANALYZED: 606
TIME ANALYZED: 623
TIME ANALYZED: 625
TIME ANALYZED: 854
TIME ANALYZED: 920
TOTAL CK: 107 U/L (ref 20–180)
TOTAL IRON BINDING CAPACITY: 229 MCG/DL (ref 250–450)
TOTAL PROTEIN: 4.6 G/DL (ref 6.4–8.3)
TOTAL PROTEIN: 4.9 G/DL (ref 6.4–8.3)
TOTAL PROTEIN: 5 G/DL (ref 6.4–8.3)
TOTAL PROTEIN: 5.1 G/DL (ref 6.4–8.3)
TOTAL PROTEIN: 5.2 G/DL (ref 6.4–8.3)
TOTAL PROTEIN: 5.2 G/DL (ref 6.4–8.3)
TOTAL PROTEIN: 5.3 G/DL (ref 6.4–8.3)
TOTAL PROTEIN: 5.5 G/DL (ref 6.4–8.3)
TOTAL PROTEIN: 5.8 G/DL (ref 6.4–8.3)
TOTAL PROTEIN: 5.9 G/DL (ref 6.4–8.3)
TOTAL PROTEIN: 6.1 G/DL (ref 6.4–8.3)
TOTAL PROTEIN: 6.2 G/DL (ref 6.4–8.3)
TOTAL PROTEIN: 6.2 G/DL (ref 6.4–8.3)
TOTAL PROTEIN: 6.3 G/DL (ref 6.4–8.3)
TOTAL PROTEIN: 6.5 G/DL (ref 6.4–8.3)
TOTAL PROTEIN: 6.6 G/DL (ref 6.4–8.3)
TOTAL PROTEIN: 6.7 G/DL (ref 6.4–8.3)
TOTAL PROTEIN: 6.7 G/DL (ref 6.4–8.3)
TOTAL PROTEIN: 6.8 G/DL (ref 6.4–8.3)
TROPONIN: 0.01 NG/ML (ref 0–0.03)
TROPONIN: 0.01 NG/ML (ref 0–0.03)
TROPONIN: 0.02 NG/ML (ref 0–0.03)
TROPONIN: 0.04 NG/ML (ref 0–0.03)
TROPONIN: 0.06 NG/ML (ref 0–0.03)
TROPONIN: 0.06 NG/ML (ref 0–0.03)
TROPONIN: <0.01 NG/ML (ref 0–0.03)
TROPONIN: <0.01 NG/ML (ref 0–0.03)
TSH SERPL DL<=0.05 MIU/L-ACNC: 0.57 UIU/ML (ref 0.27–4.2)
TSH SERPL DL<=0.05 MIU/L-ACNC: 2.07 UIU/ML (ref 0.27–4.2)
UREA NITROGEN, UR: 636 MG/DL (ref 800–1666)
URINE CULTURE, ROUTINE: NORMAL
URINE CULTURE, ROUTINE: NORMAL
UROBILINOGEN, URINE: 0.2 E.U./DL
VITAMIN B-12: 234 PG/ML (ref 211–946)
VT MECHANICAL: 350 ML
VT MECHANICAL: 450 ML
WBC # BLD: 11.2 E9/L (ref 4.5–11.5)
WBC # BLD: 2.2 E9/L (ref 4.5–11.5)
WBC # BLD: 2.3 E9/L (ref 4.5–11.5)
WBC # BLD: 2.4 E9/L (ref 4.5–11.5)
WBC # BLD: 2.5 E9/L (ref 4.5–11.5)
WBC # BLD: 2.7 E9/L (ref 4.5–11.5)
WBC # BLD: 2.9 E9/L (ref 4.5–11.5)
WBC # BLD: 3.2 E9/L (ref 4.5–11.5)
WBC # BLD: 3.3 E9/L (ref 4.5–11.5)
WBC # BLD: 3.6 E9/L (ref 4.5–11.5)
WBC # BLD: 3.6 E9/L (ref 4.5–11.5)
WBC # BLD: 3.8 E9/L (ref 4.5–11.5)
WBC # BLD: 4.7 E9/L (ref 4.5–11.5)
WBC # BLD: 4.8 E9/L (ref 4.5–11.5)
WBC # BLD: 4.8 E9/L (ref 4.5–11.5)
WBC # BLD: 4.9 E9/L (ref 4.5–11.5)
WBC # BLD: 5 E9/L (ref 4.5–11.5)
WBC # BLD: 5.1 E9/L (ref 4.5–11.5)
WBC # BLD: 5.3 E9/L (ref 4.5–11.5)
WBC # BLD: 5.5 E9/L (ref 4.5–11.5)
WBC # BLD: 5.7 E9/L (ref 4.5–11.5)
WBC # BLD: 5.8 E9/L (ref 4.5–11.5)
WBC # BLD: 5.9 E9/L (ref 4.5–11.5)
WBC # BLD: 6.3 E9/L (ref 4.5–11.5)
WBC # BLD: 6.3 E9/L (ref 4.5–11.5)
WBC # BLD: 6.5 E9/L (ref 4.5–11.5)
WBC # BLD: 6.7 E9/L (ref 4.5–11.5)
WBC # BLD: 6.8 E9/L (ref 4.5–11.5)
WBC # BLD: 7 E9/L (ref 4.5–11.5)
WBC # BLD: 7.1 E9/L (ref 4.5–11.5)
WBC # BLD: 7.1 E9/L (ref 4.5–11.5)
WBC # BLD: 7.2 E9/L (ref 4.5–11.5)
WBC # BLD: 7.4 E9/L (ref 4.5–11.5)
WBC # BLD: 8.5 E9/L (ref 4.5–11.5)
WBC UA: ABNORMAL /HPF (ref 0–5)
WOUND/ABSCESS: ABNORMAL
WOUND/ABSCESS: ABNORMAL

## 2019-01-01 PROCEDURE — 85027 COMPLETE CBC AUTOMATED: CPT

## 2019-01-01 PROCEDURE — 2140000000 HC CCU INTERMEDIATE R&B

## 2019-01-01 PROCEDURE — 6370000000 HC RX 637 (ALT 250 FOR IP): Performed by: INTERNAL MEDICINE

## 2019-01-01 PROCEDURE — 2580000003 HC RX 258: Performed by: INTERNAL MEDICINE

## 2019-01-01 PROCEDURE — 85025 COMPLETE CBC W/AUTO DIFF WBC: CPT

## 2019-01-01 PROCEDURE — 2580000003 HC RX 258: Performed by: NURSE PRACTITIONER

## 2019-01-01 PROCEDURE — 86003 ALLG SPEC IGE CRUDE XTRC EA: CPT

## 2019-01-01 PROCEDURE — 86431 RHEUMATOID FACTOR QUANT: CPT

## 2019-01-01 PROCEDURE — 97161 PT EVAL LOW COMPLEX 20 MIN: CPT

## 2019-01-01 PROCEDURE — C9113 INJ PANTOPRAZOLE SODIUM, VIA: HCPCS | Performed by: INTERNAL MEDICINE

## 2019-01-01 PROCEDURE — 99233 SBSQ HOSP IP/OBS HIGH 50: CPT | Performed by: INTERNAL MEDICINE

## 2019-01-01 PROCEDURE — 6360000002 HC RX W HCPCS: Performed by: FAMILY MEDICINE

## 2019-01-01 PROCEDURE — P9016 RBC LEUKOCYTES REDUCED: HCPCS

## 2019-01-01 PROCEDURE — 85610 PROTHROMBIN TIME: CPT

## 2019-01-01 PROCEDURE — 97530 THERAPEUTIC ACTIVITIES: CPT

## 2019-01-01 PROCEDURE — 94640 AIRWAY INHALATION TREATMENT: CPT

## 2019-01-01 PROCEDURE — 36430 TRANSFUSION BLD/BLD COMPNT: CPT

## 2019-01-01 PROCEDURE — 93923 UPR/LXTR ART STDY 3+ LVLS: CPT

## 2019-01-01 PROCEDURE — 86901 BLOOD TYPING SEROLOGIC RH(D): CPT

## 2019-01-01 PROCEDURE — 86900 BLOOD TYPING SEROLOGIC ABO: CPT

## 2019-01-01 PROCEDURE — 93926 LOWER EXTREMITY STUDY: CPT

## 2019-01-01 PROCEDURE — 82805 BLOOD GASES W/O2 SATURATION: CPT

## 2019-01-01 PROCEDURE — 36556 INSERT NON-TUNNEL CV CATH: CPT

## 2019-01-01 PROCEDURE — 99284 EMERGENCY DEPT VISIT MOD MDM: CPT

## 2019-01-01 PROCEDURE — 6360000002 HC RX W HCPCS: Performed by: INTERNAL MEDICINE

## 2019-01-01 PROCEDURE — 6370000000 HC RX 637 (ALT 250 FOR IP): Performed by: STUDENT IN AN ORGANIZED HEALTH CARE EDUCATION/TRAINING PROGRAM

## 2019-01-01 PROCEDURE — 83690 ASSAY OF LIPASE: CPT

## 2019-01-01 PROCEDURE — 2500000003 HC RX 250 WO HCPCS

## 2019-01-01 PROCEDURE — 87205 SMEAR GRAM STAIN: CPT

## 2019-01-01 PROCEDURE — 11045 DBRDMT SUBQ TISS EACH ADDL: CPT

## 2019-01-01 PROCEDURE — 82040 ASSAY OF SERUM ALBUMIN: CPT

## 2019-01-01 PROCEDURE — 36415 COLL VENOUS BLD VENIPUNCTURE: CPT

## 2019-01-01 PROCEDURE — 97162 PT EVAL MOD COMPLEX 30 MIN: CPT

## 2019-01-01 PROCEDURE — 85014 HEMATOCRIT: CPT

## 2019-01-01 PROCEDURE — 82962 GLUCOSE BLOOD TEST: CPT

## 2019-01-01 PROCEDURE — 80053 COMPREHEN METABOLIC PANEL: CPT

## 2019-01-01 PROCEDURE — 87106 FUNGI IDENTIFICATION YEAST: CPT

## 2019-01-01 PROCEDURE — 93010 ELECTROCARDIOGRAM REPORT: CPT | Performed by: INTERNAL MEDICINE

## 2019-01-01 PROCEDURE — 2500000003 HC RX 250 WO HCPCS: Performed by: FAMILY MEDICINE

## 2019-01-01 PROCEDURE — 94660 CPAP INITIATION&MGMT: CPT

## 2019-01-01 PROCEDURE — 2000000000 HC ICU R&B

## 2019-01-01 PROCEDURE — 99152 MOD SED SAME PHYS/QHP 5/>YRS: CPT | Performed by: INTERNAL MEDICINE

## 2019-01-01 PROCEDURE — 6370000000 HC RX 637 (ALT 250 FOR IP): Performed by: FAMILY MEDICINE

## 2019-01-01 PROCEDURE — 3430000000 HC RX DIAGNOSTIC RADIOPHARMACEUTICAL: Performed by: RADIOLOGY

## 2019-01-01 PROCEDURE — 6370000000 HC RX 637 (ALT 250 FOR IP): Performed by: NURSE PRACTITIONER

## 2019-01-01 PROCEDURE — 11042 DBRDMT SUBQ TIS 1ST 20SQCM/<: CPT

## 2019-01-01 PROCEDURE — 31500 INSERT EMERGENCY AIRWAY: CPT | Performed by: ANESTHESIOLOGY

## 2019-01-01 PROCEDURE — 75716 ARTERY X-RAYS ARMS/LEGS: CPT | Performed by: SURGERY

## 2019-01-01 PROCEDURE — 71250 CT THORAX DX C-: CPT

## 2019-01-01 PROCEDURE — 90471 IMMUNIZATION ADMIN: CPT | Performed by: NURSE PRACTITIONER

## 2019-01-01 PROCEDURE — 3700000000 HC ANESTHESIA ATTENDED CARE

## 2019-01-01 PROCEDURE — 6360000002 HC RX W HCPCS: Performed by: NURSE ANESTHETIST, CERTIFIED REGISTERED

## 2019-01-01 PROCEDURE — 87015 SPECIMEN INFECT AGNT CONCNTJ: CPT

## 2019-01-01 PROCEDURE — 85048 AUTOMATED LEUKOCYTE COUNT: CPT

## 2019-01-01 PROCEDURE — 36592 COLLECT BLOOD FROM PICC: CPT

## 2019-01-01 PROCEDURE — 83986 ASSAY PH BODY FLUID NOS: CPT

## 2019-01-01 PROCEDURE — 84100 ASSAY OF PHOSPHORUS: CPT

## 2019-01-01 PROCEDURE — 74018 RADEX ABDOMEN 1 VIEW: CPT

## 2019-01-01 PROCEDURE — 6360000002 HC RX W HCPCS: Performed by: NURSE PRACTITIONER

## 2019-01-01 PROCEDURE — 94726 PLETHYSMOGRAPHY LUNG VOLUMES: CPT

## 2019-01-01 PROCEDURE — 2500000003 HC RX 250 WO HCPCS: Performed by: INTERNAL MEDICINE

## 2019-01-01 PROCEDURE — 87186 SC STD MICRODIL/AGAR DIL: CPT

## 2019-01-01 PROCEDURE — 3609011500 HC BRONCHOSCOPY DIAGNOSTIC OR CELL WASH ONLY: Performed by: INTERNAL MEDICINE

## 2019-01-01 PROCEDURE — 6360000002 HC RX W HCPCS: Performed by: EMERGENCY MEDICINE

## 2019-01-01 PROCEDURE — 97597 DBRDMT OPN WND 1ST 20 CM/<: CPT | Performed by: SURGERY

## 2019-01-01 PROCEDURE — 83605 ASSAY OF LACTIC ACID: CPT

## 2019-01-01 PROCEDURE — 84134 ASSAY OF PREALBUMIN: CPT

## 2019-01-01 PROCEDURE — 87077 CULTURE AEROBIC IDENTIFY: CPT

## 2019-01-01 PROCEDURE — 87206 SMEAR FLUORESCENT/ACID STAI: CPT

## 2019-01-01 PROCEDURE — 87102 FUNGUS ISOLATION CULTURE: CPT

## 2019-01-01 PROCEDURE — 84484 ASSAY OF TROPONIN QUANT: CPT

## 2019-01-01 PROCEDURE — 94726 PLETHYSMOGRAPHY LUNG VOLUMES: CPT | Performed by: INTERNAL MEDICINE

## 2019-01-01 PROCEDURE — 71045 X-RAY EXAM CHEST 1 VIEW: CPT

## 2019-01-01 PROCEDURE — 2700000000 HC OXYGEN THERAPY PER DAY

## 2019-01-01 PROCEDURE — 11042 DBRDMT SUBQ TIS 1ST 20SQCM/<: CPT | Performed by: SURGERY

## 2019-01-01 PROCEDURE — 83880 ASSAY OF NATRIURETIC PEPTIDE: CPT

## 2019-01-01 PROCEDURE — 86923 COMPATIBILITY TEST ELECTRIC: CPT

## 2019-01-01 PROCEDURE — 99213 OFFICE O/P EST LOW 20 MIN: CPT | Performed by: INTERNAL MEDICINE

## 2019-01-01 PROCEDURE — 83921 ORGANIC ACID SINGLE QUANT: CPT

## 2019-01-01 PROCEDURE — 02HV33Z INSERTION OF INFUSION DEVICE INTO SUPERIOR VENA CAVA, PERCUTANEOUS APPROACH: ICD-10-PCS | Performed by: HOSPITALIST

## 2019-01-01 PROCEDURE — 81270 JAK2 GENE: CPT

## 2019-01-01 PROCEDURE — 82435 ASSAY OF BLOOD CHLORIDE: CPT

## 2019-01-01 PROCEDURE — 87088 URINE BACTERIA CULTURE: CPT

## 2019-01-01 PROCEDURE — 97597 DBRDMT OPN WND 1ST 20 CM/<: CPT

## 2019-01-01 PROCEDURE — 51702 INSERT TEMP BLADDER CATH: CPT

## 2019-01-01 PROCEDURE — 11045 DBRDMT SUBQ TISS EACH ADDL: CPT | Performed by: SURGERY

## 2019-01-01 PROCEDURE — 94060 EVALUATION OF WHEEZING: CPT | Performed by: INTERNAL MEDICINE

## 2019-01-01 PROCEDURE — C1751 CATH, INF, PER/CENT/MIDLINE: HCPCS

## 2019-01-01 PROCEDURE — 84133 ASSAY OF URINE POTASSIUM: CPT

## 2019-01-01 PROCEDURE — 1200000000 HC SEMI PRIVATE

## 2019-01-01 PROCEDURE — 6360000002 HC RX W HCPCS

## 2019-01-01 PROCEDURE — 99285 EMERGENCY DEPT VISIT HI MDM: CPT

## 2019-01-01 PROCEDURE — 87450 HC DIRECT STREP B ANTIGEN: CPT

## 2019-01-01 PROCEDURE — 0BH17EZ INSERTION OF ENDOTRACHEAL AIRWAY INTO TRACHEA, VIA NATURAL OR ARTIFICIAL OPENING: ICD-10-PCS | Performed by: HOSPITALIST

## 2019-01-01 PROCEDURE — 0B9F8ZX DRAINAGE OF RIGHT LOWER LUNG LOBE, VIA NATURAL OR ARTIFICIAL OPENING ENDOSCOPIC, DIAGNOSTIC: ICD-10-PCS | Performed by: INTERNAL MEDICINE

## 2019-01-01 PROCEDURE — 0D9670Z DRAINAGE OF STOMACH WITH DRAINAGE DEVICE, VIA NATURAL OR ARTIFICIAL OPENING: ICD-10-PCS | Performed by: HOSPITALIST

## 2019-01-01 PROCEDURE — 87070 CULTURE OTHR SPECIMN AEROBIC: CPT

## 2019-01-01 PROCEDURE — 84145 PROCALCITONIN (PCT): CPT

## 2019-01-01 PROCEDURE — 6360000002 HC RX W HCPCS: Performed by: SURGERY

## 2019-01-01 PROCEDURE — 84443 ASSAY THYROID STIM HORMONE: CPT

## 2019-01-01 PROCEDURE — 37221 PR REVSC OPN/PRQ ILIAC ART W/STNT PLMT & ANGIOPLSTY: CPT | Performed by: SURGERY

## 2019-01-01 PROCEDURE — 97598 DBRDMT OPN WND ADDL 20CM/<: CPT | Performed by: SURGERY

## 2019-01-01 PROCEDURE — 83735 ASSAY OF MAGNESIUM: CPT

## 2019-01-01 PROCEDURE — 86038 ANTINUCLEAR ANTIBODIES: CPT

## 2019-01-01 PROCEDURE — 86703 HIV-1/HIV-2 1 RESULT ANTBDY: CPT

## 2019-01-01 PROCEDURE — 78707 K FLOW/FUNCT IMAGE W/O DRUG: CPT

## 2019-01-01 PROCEDURE — 99232 SBSQ HOSP IP/OBS MODERATE 35: CPT | Performed by: NURSE PRACTITIONER

## 2019-01-01 PROCEDURE — 83930 ASSAY OF BLOOD OSMOLALITY: CPT

## 2019-01-01 PROCEDURE — G0378 HOSPITAL OBSERVATION PER HR: HCPCS

## 2019-01-01 PROCEDURE — 94002 VENT MGMT INPAT INIT DAY: CPT

## 2019-01-01 PROCEDURE — 85730 THROMBOPLASTIN TIME PARTIAL: CPT

## 2019-01-01 PROCEDURE — 97598 DBRDMT OPN WND ADDL 20CM/<: CPT

## 2019-01-01 PROCEDURE — 94669 MECHANICAL CHEST WALL OSCILL: CPT

## 2019-01-01 PROCEDURE — 2580000003 HC RX 258: Performed by: EMERGENCY MEDICINE

## 2019-01-01 PROCEDURE — 82436 ASSAY OF URINE CHLORIDE: CPT

## 2019-01-01 PROCEDURE — 84540 ASSAY OF URINE/UREA-N: CPT

## 2019-01-01 PROCEDURE — 82044 UR ALBUMIN SEMIQUANTITATIVE: CPT

## 2019-01-01 PROCEDURE — 85018 HEMOGLOBIN: CPT

## 2019-01-01 PROCEDURE — 94060 EVALUATION OF WHEEZING: CPT

## 2019-01-01 PROCEDURE — 83615 LACTATE (LD) (LDH) ENZYME: CPT

## 2019-01-01 PROCEDURE — 2580000003 HC RX 258: Performed by: SURGERY

## 2019-01-01 PROCEDURE — 37224 HC FEM POP TERRITORY PLASTY: CPT | Performed by: SURGERY

## 2019-01-01 PROCEDURE — 37221 HC ILIAC TERRITORY PLASTY STENT: CPT | Performed by: SURGERY

## 2019-01-01 PROCEDURE — G0480 DRUG TEST DEF 1-7 CLASSES: HCPCS

## 2019-01-01 PROCEDURE — 31500 INSERT EMERGENCY AIRWAY: CPT

## 2019-01-01 PROCEDURE — 88305 TISSUE EXAM BY PATHOLOGIST: CPT

## 2019-01-01 PROCEDURE — 0BJ08ZZ INSPECTION OF TRACHEOBRONCHIAL TREE, VIA NATURAL OR ARTIFICIAL OPENING ENDOSCOPIC: ICD-10-PCS | Performed by: INTERNAL MEDICINE

## 2019-01-01 PROCEDURE — 6370000000 HC RX 637 (ALT 250 FOR IP): Performed by: EMERGENCY MEDICINE

## 2019-01-01 PROCEDURE — 94003 VENT MGMT INPAT SUBQ DAY: CPT

## 2019-01-01 PROCEDURE — 87486 CHLMYD PNEUM DNA AMP PROBE: CPT

## 2019-01-01 PROCEDURE — 99223 1ST HOSP IP/OBS HIGH 75: CPT | Performed by: INTERNAL MEDICINE

## 2019-01-01 PROCEDURE — 97535 SELF CARE MNGMENT TRAINING: CPT

## 2019-01-01 PROCEDURE — 84300 ASSAY OF URINE SODIUM: CPT

## 2019-01-01 PROCEDURE — 2580000003 HC RX 258: Performed by: RADIOLOGY

## 2019-01-01 PROCEDURE — 80048 BASIC METABOLIC PNL TOTAL CA: CPT

## 2019-01-01 PROCEDURE — 2580000003 HC RX 258

## 2019-01-01 PROCEDURE — 99238 HOSP IP/OBS DSCHRG MGMT 30/<: CPT | Performed by: SURGERY

## 2019-01-01 PROCEDURE — 94668 MNPJ CHEST WALL SBSQ: CPT

## 2019-01-01 PROCEDURE — 70450 CT HEAD/BRAIN W/O DYE: CPT

## 2019-01-01 PROCEDURE — 83935 ASSAY OF URINE OSMOLALITY: CPT

## 2019-01-01 PROCEDURE — 36600 WITHDRAWAL OF ARTERIAL BLOOD: CPT

## 2019-01-01 PROCEDURE — 94664 DEMO&/EVAL PT USE INHALER: CPT

## 2019-01-01 PROCEDURE — C2623 CATH, TRANSLUMIN, DRUG-COAT: HCPCS

## 2019-01-01 PROCEDURE — 89051 BODY FLUID CELL COUNT: CPT

## 2019-01-01 PROCEDURE — 2580000003 HC RX 258: Performed by: FAMILY MEDICINE

## 2019-01-01 PROCEDURE — 82088 ASSAY OF ALDOSTERONE: CPT

## 2019-01-01 PROCEDURE — 2709999900 HC NON-CHARGEABLE SUPPLY: Performed by: SURGERY

## 2019-01-01 PROCEDURE — 84132 ASSAY OF SERUM POTASSIUM: CPT

## 2019-01-01 PROCEDURE — 74177 CT ABD & PELVIS W/CONTRAST: CPT

## 2019-01-01 PROCEDURE — 6360000002 HC RX W HCPCS: Performed by: STUDENT IN AN ORGANIZED HEALTH CARE EDUCATION/TRAINING PROGRAM

## 2019-01-01 PROCEDURE — 87798 DETECT AGENT NOS DNA AMP: CPT

## 2019-01-01 PROCEDURE — 97110 THERAPEUTIC EXERCISES: CPT

## 2019-01-01 PROCEDURE — 90715 TDAP VACCINE 7 YRS/> IM: CPT | Performed by: NURSE PRACTITIONER

## 2019-01-01 PROCEDURE — 94667 MNPJ CHEST WALL 1ST: CPT

## 2019-01-01 PROCEDURE — 87116 MYCOBACTERIA CULTURE: CPT

## 2019-01-01 PROCEDURE — 93005 ELECTROCARDIOGRAM TRACING: CPT | Performed by: INTERNAL MEDICINE

## 2019-01-01 PROCEDURE — 87581 M.PNEUMON DNA AMP PROBE: CPT

## 2019-01-01 PROCEDURE — C1725 CATH, TRANSLUMIN NON-LASER: HCPCS

## 2019-01-01 PROCEDURE — 96368 THER/DIAG CONCURRENT INF: CPT

## 2019-01-01 PROCEDURE — 86850 RBC ANTIBODY SCREEN: CPT

## 2019-01-01 PROCEDURE — 84156 ASSAY OF PROTEIN URINE: CPT

## 2019-01-01 PROCEDURE — 84295 ASSAY OF SERUM SODIUM: CPT

## 2019-01-01 PROCEDURE — 82785 ASSAY OF IGE: CPT

## 2019-01-01 PROCEDURE — 81001 URINALYSIS AUTO W/SCOPE: CPT

## 2019-01-01 PROCEDURE — 3700000001 HC ADD 15 MINUTES (ANESTHESIA)

## 2019-01-01 PROCEDURE — 87075 CULTR BACTERIA EXCEPT BLOOD: CPT

## 2019-01-01 PROCEDURE — 0W993ZZ DRAINAGE OF RIGHT PLEURAL CAVITY, PERCUTANEOUS APPROACH: ICD-10-PCS | Performed by: INTERNAL MEDICINE

## 2019-01-01 PROCEDURE — 82947 ASSAY GLUCOSE BLOOD QUANT: CPT

## 2019-01-01 PROCEDURE — 80307 DRUG TEST PRSMV CHEM ANLYZR: CPT

## 2019-01-01 PROCEDURE — C8901 MRA W/O CONT, ABD: HCPCS

## 2019-01-01 PROCEDURE — 88275 CYTOGENETICS 100-300: CPT

## 2019-01-01 PROCEDURE — 88184 FLOWCYTOMETRY/ TC 1 MARKER: CPT

## 2019-01-01 PROCEDURE — 87149 DNA/RNA DIRECT PROBE: CPT

## 2019-01-01 PROCEDURE — 94729 DIFFUSING CAPACITY: CPT

## 2019-01-01 PROCEDURE — 93005 ELECTROCARDIOGRAM TRACING: CPT | Performed by: EMERGENCY MEDICINE

## 2019-01-01 PROCEDURE — 2500000003 HC RX 250 WO HCPCS: Performed by: EMERGENCY MEDICINE

## 2019-01-01 PROCEDURE — 32555 ASPIRATE PLEURA W/ IMAGING: CPT

## 2019-01-01 PROCEDURE — 84244 ASSAY OF RENIN: CPT

## 2019-01-01 PROCEDURE — 88185 FLOWCYTOMETRY/TC ADD-ON: CPT

## 2019-01-01 PROCEDURE — 87633 RESP VIRUS 12-25 TARGETS: CPT

## 2019-01-01 PROCEDURE — 97165 OT EVAL LOW COMPLEX 30 MIN: CPT

## 2019-01-01 PROCEDURE — 2709999900 HC NON-CHARGEABLE SUPPLY: Performed by: INTERNAL MEDICINE

## 2019-01-01 PROCEDURE — 82607 VITAMIN B-12: CPT

## 2019-01-01 PROCEDURE — 99232 SBSQ HOSP IP/OBS MODERATE 35: CPT | Performed by: INTERNAL MEDICINE

## 2019-01-01 PROCEDURE — 93306 TTE W/DOPPLER COMPLETE: CPT

## 2019-01-01 PROCEDURE — 82746 ASSAY OF FOLIC ACID SERUM: CPT

## 2019-01-01 PROCEDURE — 99204 OFFICE O/P NEW MOD 45 MIN: CPT | Performed by: SURGERY

## 2019-01-01 PROCEDURE — 6370000000 HC RX 637 (ALT 250 FOR IP): Performed by: SURGERY

## 2019-01-01 PROCEDURE — 2580000003 HC RX 258: Performed by: STUDENT IN AN ORGANIZED HEALTH CARE EDUCATION/TRAINING PROGRAM

## 2019-01-01 PROCEDURE — 82728 ASSAY OF FERRITIN: CPT

## 2019-01-01 PROCEDURE — 6360000004 HC RX CONTRAST MEDICATION: Performed by: RADIOLOGY

## 2019-01-01 PROCEDURE — 82533 TOTAL CORTISOL: CPT

## 2019-01-01 PROCEDURE — 86880 COOMBS TEST DIRECT: CPT

## 2019-01-01 PROCEDURE — 82570 ASSAY OF URINE CREATININE: CPT

## 2019-01-01 PROCEDURE — 88112 CYTOPATH CELL ENHANCE TECH: CPT

## 2019-01-01 PROCEDURE — 97167 OT EVAL HIGH COMPLEX 60 MIN: CPT

## 2019-01-01 PROCEDURE — C1769 GUIDE WIRE: HCPCS

## 2019-01-01 PROCEDURE — 82550 ASSAY OF CK (CPK): CPT

## 2019-01-01 PROCEDURE — 96366 THER/PROPH/DIAG IV INF ADDON: CPT

## 2019-01-01 PROCEDURE — 84157 ASSAY OF PROTEIN OTHER: CPT

## 2019-01-01 PROCEDURE — 83540 ASSAY OF IRON: CPT

## 2019-01-01 PROCEDURE — A9562 TC99M MERTIATIDE: HCPCS | Performed by: RADIOLOGY

## 2019-01-01 PROCEDURE — 99213 OFFICE O/P EST LOW 20 MIN: CPT | Performed by: SURGERY

## 2019-01-01 PROCEDURE — 99232 SBSQ HOSP IP/OBS MODERATE 35: CPT | Performed by: SURGERY

## 2019-01-01 PROCEDURE — 97166 OT EVAL MOD COMPLEX 45 MIN: CPT

## 2019-01-01 PROCEDURE — 87040 BLOOD CULTURE FOR BACTERIA: CPT

## 2019-01-01 PROCEDURE — 36569 INSJ PICC 5 YR+ W/O IMAGING: CPT

## 2019-01-01 PROCEDURE — 88237 TISSUE CULTURE BONE MARROW: CPT

## 2019-01-01 PROCEDURE — 93005 ELECTROCARDIOGRAM TRACING: CPT | Performed by: STUDENT IN AN ORGANIZED HEALTH CARE EDUCATION/TRAINING PROGRAM

## 2019-01-01 PROCEDURE — 82565 ASSAY OF CREATININE: CPT

## 2019-01-01 PROCEDURE — 3609017100 HC EGD: Performed by: SURGERY

## 2019-01-01 PROCEDURE — 37224 PR REVSC OPN/PRG FEM/POP W/ANGIOPLASTY UNI: CPT | Performed by: SURGERY

## 2019-01-01 PROCEDURE — 99222 1ST HOSP IP/OBS MODERATE 55: CPT | Performed by: SURGERY

## 2019-01-01 PROCEDURE — 93005 ELECTROCARDIOGRAM TRACING: CPT | Performed by: FAMILY MEDICINE

## 2019-01-01 PROCEDURE — 94729 DIFFUSING CAPACITY: CPT | Performed by: INTERNAL MEDICINE

## 2019-01-01 PROCEDURE — 96365 THER/PROPH/DIAG IV INF INIT: CPT

## 2019-01-01 PROCEDURE — 88271 CYTOGENETICS DNA PROBE: CPT

## 2019-01-01 PROCEDURE — 99221 1ST HOSP IP/OBS SF/LOW 40: CPT | Performed by: NURSE PRACTITIONER

## 2019-01-01 PROCEDURE — C1874 STENT, COATED/COV W/DEL SYS: HCPCS

## 2019-01-01 PROCEDURE — 5A1955Z RESPIRATORY VENTILATION, GREATER THAN 96 CONSECUTIVE HOURS: ICD-10-PCS | Performed by: HOSPITALIST

## 2019-01-01 PROCEDURE — C1887 CATHETER, GUIDING: HCPCS

## 2019-01-01 PROCEDURE — 83550 IRON BINDING TEST: CPT

## 2019-01-01 PROCEDURE — 83036 HEMOGLOBIN GLYCOSYLATED A1C: CPT

## 2019-01-01 PROCEDURE — 99213 OFFICE O/P EST LOW 20 MIN: CPT

## 2019-01-01 PROCEDURE — 87081 CULTURE SCREEN ONLY: CPT

## 2019-01-01 PROCEDURE — 99214 OFFICE O/P EST MOD 30 MIN: CPT

## 2019-01-01 PROCEDURE — 99283 EMERGENCY DEPT VISIT LOW MDM: CPT

## 2019-01-01 PROCEDURE — 97112 NEUROMUSCULAR REEDUCATION: CPT

## 2019-01-01 PROCEDURE — 85045 AUTOMATED RETICULOCYTE COUNT: CPT

## 2019-01-01 PROCEDURE — 76937 US GUIDE VASCULAR ACCESS: CPT

## 2019-01-01 PROCEDURE — 73590 X-RAY EXAM OF LOWER LEG: CPT

## 2019-01-01 PROCEDURE — 0DJ08ZZ INSPECTION OF UPPER INTESTINAL TRACT, VIA NATURAL OR ARTIFICIAL OPENING ENDOSCOPIC: ICD-10-PCS | Performed by: SURGERY

## 2019-01-01 PROCEDURE — 80074 ACUTE HEPATITIS PANEL: CPT

## 2019-01-01 PROCEDURE — 83010 ASSAY OF HAPTOGLOBIN QUANT: CPT

## 2019-01-01 PROCEDURE — 2709999900 HC NON-CHARGEABLE SUPPLY

## 2019-01-01 PROCEDURE — 96374 THER/PROPH/DIAG INJ IV PUSH: CPT

## 2019-01-01 PROCEDURE — 99152 MOD SED SAME PHYS/QHP 5/>YRS: CPT | Performed by: SURGERY

## 2019-01-01 PROCEDURE — C1760 CLOSURE DEV, VASC: HCPCS

## 2019-01-01 PROCEDURE — C1894 INTRO/SHEATH, NON-LASER: HCPCS

## 2019-01-01 RX ORDER — ALBUTEROL SULFATE 90 UG/1
2 AEROSOL, METERED RESPIRATORY (INHALATION) EVERY 6 HOURS PRN
Status: DISCONTINUED | OUTPATIENT
Start: 2019-01-01 | End: 2019-01-01 | Stop reason: HOSPADM

## 2019-01-01 RX ORDER — SODIUM CHLORIDE 0.9 % (FLUSH) 0.9 %
10 SYRINGE (ML) INJECTION
Status: COMPLETED | OUTPATIENT
Start: 2019-01-01 | End: 2019-01-01

## 2019-01-01 RX ORDER — NADOLOL 20 MG/1
20 TABLET ORAL DAILY
Status: DISCONTINUED | OUTPATIENT
Start: 2019-01-01 | End: 2019-01-01

## 2019-01-01 RX ORDER — PROPOFOL 10 MG/ML
10 INJECTION, EMULSION INTRAVENOUS ONCE
Status: DISCONTINUED | OUTPATIENT
Start: 2019-01-01 | End: 2019-01-01

## 2019-01-01 RX ORDER — OXYCODONE HYDROCHLORIDE 20 MG/1
20 TABLET ORAL 4 TIMES DAILY PRN
Refills: 0 | Status: ON HOLD | COMMUNITY
Start: 2019-01-01 | End: 2019-01-01 | Stop reason: HOSPADM

## 2019-01-01 RX ORDER — 0.9 % SODIUM CHLORIDE 0.9 %
1000 INTRAVENOUS SOLUTION INTRAVENOUS ONCE
Status: COMPLETED | OUTPATIENT
Start: 2019-01-01 | End: 2019-01-01

## 2019-01-01 RX ORDER — CEFAZOLIN SODIUM 2 G/50ML
2 SOLUTION INTRAVENOUS
Status: DISCONTINUED | OUTPATIENT
Start: 2019-01-01 | End: 2019-01-01 | Stop reason: SDUPTHER

## 2019-01-01 RX ORDER — CLOPIDOGREL BISULFATE 75 MG/1
75 TABLET ORAL DAILY
Status: DISCONTINUED | OUTPATIENT
Start: 2019-01-01 | End: 2019-01-01 | Stop reason: HOSPADM

## 2019-01-01 RX ORDER — CLONAZEPAM 0.5 MG/1
0.5 TABLET ORAL 2 TIMES DAILY
Status: DISCONTINUED | OUTPATIENT
Start: 2019-01-01 | End: 2019-01-01 | Stop reason: HOSPADM

## 2019-01-01 RX ORDER — MORPHINE SULFATE 2 MG/ML
INJECTION, SOLUTION INTRAMUSCULAR; INTRAVENOUS
Status: DISPENSED
Start: 2019-01-01 | End: 2019-01-01

## 2019-01-01 RX ORDER — HYDRALAZINE HYDROCHLORIDE 20 MG/ML
10 INJECTION INTRAMUSCULAR; INTRAVENOUS EVERY 6 HOURS PRN
Status: DISCONTINUED | OUTPATIENT
Start: 2019-01-01 | End: 2019-01-01 | Stop reason: HOSPADM

## 2019-01-01 RX ORDER — DOXYCYCLINE HYCLATE 100 MG
100 TABLET ORAL 2 TIMES DAILY
Qty: 20 TABLET | Refills: 0 | Status: SHIPPED | OUTPATIENT
Start: 2019-01-01 | End: 2019-01-01

## 2019-01-01 RX ORDER — LIDOCAINE HYDROCHLORIDE 20 MG/ML
JELLY TOPICAL PRN
Status: DISCONTINUED | OUTPATIENT
Start: 2019-01-01 | End: 2019-01-01 | Stop reason: HOSPADM

## 2019-01-01 RX ORDER — 0.9 % SODIUM CHLORIDE 0.9 %
250 INTRAVENOUS SOLUTION INTRAVENOUS ONCE
Status: COMPLETED | OUTPATIENT
Start: 2019-01-01 | End: 2019-01-01

## 2019-01-01 RX ORDER — ACETAMINOPHEN 325 MG/1
650 TABLET ORAL EVERY 4 HOURS PRN
Status: DISCONTINUED | OUTPATIENT
Start: 2019-01-01 | End: 2019-01-01 | Stop reason: HOSPADM

## 2019-01-01 RX ORDER — NADOLOL 20 MG/1
20 TABLET ORAL DAILY
Status: DISCONTINUED | OUTPATIENT
Start: 2019-01-01 | End: 2019-01-01 | Stop reason: HOSPADM

## 2019-01-01 RX ORDER — CITALOPRAM 20 MG/1
20 TABLET ORAL NIGHTLY
Status: DISCONTINUED | OUTPATIENT
Start: 2019-01-01 | End: 2019-01-01 | Stop reason: HOSPADM

## 2019-01-01 RX ORDER — AZITHROMYCIN 250 MG/1
250 TABLET, FILM COATED ORAL DAILY
Qty: 7 TABLET | Refills: 0 | Status: SHIPPED | OUTPATIENT
Start: 2019-01-01 | End: 2019-01-01

## 2019-01-01 RX ORDER — METHYLPREDNISOLONE SODIUM SUCCINATE 40 MG/ML
40 INJECTION, POWDER, LYOPHILIZED, FOR SOLUTION INTRAMUSCULAR; INTRAVENOUS EVERY 8 HOURS
Status: DISCONTINUED | OUTPATIENT
Start: 2019-01-01 | End: 2019-01-01

## 2019-01-01 RX ORDER — MIDAZOLAM HYDROCHLORIDE 1 MG/ML
4 INJECTION INTRAMUSCULAR; INTRAVENOUS ONCE
Status: COMPLETED | OUTPATIENT
Start: 2019-01-01 | End: 2019-01-01

## 2019-01-01 RX ORDER — LORAZEPAM 2 MG/ML
0.5 INJECTION INTRAMUSCULAR ONCE
Status: COMPLETED | OUTPATIENT
Start: 2019-01-01 | End: 2019-01-01

## 2019-01-01 RX ORDER — 0.9 % SODIUM CHLORIDE 0.9 %
10 VIAL (ML) INJECTION DAILY
Status: DISCONTINUED | OUTPATIENT
Start: 2019-01-01 | End: 2019-01-01 | Stop reason: SDUPTHER

## 2019-01-01 RX ORDER — SODIUM CHLORIDE 0.9 % (FLUSH) 0.9 %
10 SYRINGE (ML) INJECTION EVERY 12 HOURS SCHEDULED
Status: DISCONTINUED | OUTPATIENT
Start: 2019-01-01 | End: 2019-01-01 | Stop reason: HOSPADM

## 2019-01-01 RX ORDER — DILTIAZEM HYDROCHLORIDE 180 MG/1
180 CAPSULE, COATED, EXTENDED RELEASE ORAL DAILY
Status: DISCONTINUED | OUTPATIENT
Start: 2019-01-01 | End: 2019-01-01

## 2019-01-01 RX ORDER — LIDOCAINE HYDROCHLORIDE 20 MG/ML
JELLY TOPICAL ONCE
Status: DISCONTINUED | OUTPATIENT
Start: 2019-01-01 | End: 2019-01-01 | Stop reason: HOSPADM

## 2019-01-01 RX ORDER — LORAZEPAM 2 MG/ML
1 INJECTION INTRAMUSCULAR EVERY 4 HOURS PRN
Status: DISCONTINUED | OUTPATIENT
Start: 2019-01-01 | End: 2019-01-01 | Stop reason: HOSPADM

## 2019-01-01 RX ORDER — FUROSEMIDE 20 MG/1
20 TABLET ORAL DAILY
Status: DISCONTINUED | OUTPATIENT
Start: 2019-01-01 | End: 2019-01-01 | Stop reason: HOSPADM

## 2019-01-01 RX ORDER — POLYETHYLENE GLYCOL 3350 17 G/17G
17 POWDER, FOR SOLUTION ORAL DAILY
Status: DISCONTINUED | OUTPATIENT
Start: 2019-01-01 | End: 2019-01-01 | Stop reason: HOSPADM

## 2019-01-01 RX ORDER — NICOTINE POLACRILEX 4 MG
15 LOZENGE BUCCAL PRN
Status: DISCONTINUED | OUTPATIENT
Start: 2019-01-01 | End: 2019-01-01 | Stop reason: HOSPADM

## 2019-01-01 RX ORDER — DEXTROSE MONOHYDRATE 50 MG/ML
100 INJECTION, SOLUTION INTRAVENOUS PRN
Status: DISCONTINUED | OUTPATIENT
Start: 2019-01-01 | End: 2019-01-01 | Stop reason: HOSPADM

## 2019-01-01 RX ORDER — AMIODARONE HYDROCHLORIDE 200 MG/1
200 TABLET ORAL 2 TIMES DAILY
Status: DISCONTINUED | OUTPATIENT
Start: 2019-01-01 | End: 2019-01-01 | Stop reason: HOSPADM

## 2019-01-01 RX ORDER — SULFAMETHOXAZOLE AND TRIMETHOPRIM 800; 160 MG/1; MG/1
2 TABLET ORAL EVERY 12 HOURS SCHEDULED
Status: DISCONTINUED | OUTPATIENT
Start: 2019-01-01 | End: 2019-01-01 | Stop reason: HOSPADM

## 2019-01-01 RX ORDER — HYDROCODONE BITARTRATE AND ACETAMINOPHEN 5; 325 MG/1; MG/1
1 TABLET ORAL EVERY 8 HOURS PRN
Qty: 3 TABLET | Refills: 0 | Status: SHIPPED | OUTPATIENT
Start: 2019-01-01 | End: 2019-01-01

## 2019-01-01 RX ORDER — HYDRALAZINE HYDROCHLORIDE 50 MG/1
50 TABLET, FILM COATED ORAL EVERY 8 HOURS SCHEDULED
Status: DISCONTINUED | OUTPATIENT
Start: 2019-01-01 | End: 2019-01-01 | Stop reason: HOSPADM

## 2019-01-01 RX ORDER — AMIODARONE HYDROCHLORIDE 200 MG/1
200 TABLET ORAL DAILY
Qty: 30 TABLET | Refills: 3 | Status: SHIPPED | OUTPATIENT
Start: 2019-01-01

## 2019-01-01 RX ORDER — GLYCOPYRROLATE 0.2 MG/ML
INJECTION INTRAMUSCULAR; INTRAVENOUS
Status: COMPLETED
Start: 2019-01-01 | End: 2019-01-01

## 2019-01-01 RX ORDER — SODIUM CHLORIDE 9 MG/ML
INJECTION, SOLUTION INTRAVENOUS CONTINUOUS
Status: ACTIVE | OUTPATIENT
Start: 2019-01-01 | End: 2019-01-01

## 2019-01-01 RX ORDER — SODIUM CHLORIDE 0.9 % (FLUSH) 0.9 %
10 SYRINGE (ML) INJECTION EVERY 12 HOURS SCHEDULED
Status: DISCONTINUED | OUTPATIENT
Start: 2019-01-01 | End: 2019-01-01

## 2019-01-01 RX ORDER — PROPOFOL 10 MG/ML
10 INJECTION, EMULSION INTRAVENOUS
Status: DISCONTINUED | OUTPATIENT
Start: 2019-01-01 | End: 2019-01-01

## 2019-01-01 RX ORDER — SODIUM CHLORIDE 0.9 % (FLUSH) 0.9 %
10 SYRINGE (ML) INJECTION PRN
Status: DISCONTINUED | OUTPATIENT
Start: 2019-01-01 | End: 2019-01-01 | Stop reason: SDUPTHER

## 2019-01-01 RX ORDER — ATORVASTATIN CALCIUM 40 MG/1
80 TABLET, FILM COATED ORAL NIGHTLY
Status: DISCONTINUED | OUTPATIENT
Start: 2019-01-01 | End: 2019-01-01 | Stop reason: HOSPADM

## 2019-01-01 RX ORDER — MAGNESIUM SULFATE IN WATER 40 MG/ML
2 INJECTION, SOLUTION INTRAVENOUS ONCE
Status: COMPLETED | OUTPATIENT
Start: 2019-01-01 | End: 2019-01-01

## 2019-01-01 RX ORDER — LABETALOL HYDROCHLORIDE 5 MG/ML
10 INJECTION, SOLUTION INTRAVENOUS EVERY 30 MIN PRN
Status: DISCONTINUED | OUTPATIENT
Start: 2019-01-01 | End: 2019-01-01 | Stop reason: HOSPADM

## 2019-01-01 RX ORDER — AMIODARONE HYDROCHLORIDE 200 MG/1
200 TABLET ORAL DAILY
Status: DISCONTINUED | OUTPATIENT
Start: 2019-01-01 | End: 2019-01-01 | Stop reason: HOSPADM

## 2019-01-01 RX ORDER — DIPHENHYDRAMINE HCL 25 MG
25 TABLET ORAL NIGHTLY PRN
Status: DISCONTINUED | OUTPATIENT
Start: 2019-01-01 | End: 2019-01-01 | Stop reason: HOSPADM

## 2019-01-01 RX ORDER — SODIUM CHLORIDE 0.9 % (FLUSH) 0.9 %
10 SYRINGE (ML) INJECTION PRN
Status: DISCONTINUED | OUTPATIENT
Start: 2019-01-01 | End: 2019-01-01 | Stop reason: HOSPADM

## 2019-01-01 RX ORDER — SODIUM POLYSTYRENE SULFONATE 4.1 MEQ/G
15 POWDER, FOR SUSPENSION ORAL; RECTAL ONCE
Status: COMPLETED | OUTPATIENT
Start: 2019-01-01 | End: 2019-01-01

## 2019-01-01 RX ORDER — PANTOPRAZOLE SODIUM 40 MG/10ML
40 INJECTION, POWDER, LYOPHILIZED, FOR SOLUTION INTRAVENOUS 2 TIMES DAILY
Status: DISCONTINUED | OUTPATIENT
Start: 2019-01-01 | End: 2019-01-01 | Stop reason: HOSPADM

## 2019-01-01 RX ORDER — MIDAZOLAM HYDROCHLORIDE 1 MG/ML
INJECTION INTRAMUSCULAR; INTRAVENOUS
Status: COMPLETED
Start: 2019-01-01 | End: 2019-01-01

## 2019-01-01 RX ORDER — DILTIAZEM HYDROCHLORIDE 180 MG/1
180 CAPSULE, COATED, EXTENDED RELEASE ORAL DAILY
Status: DISCONTINUED | OUTPATIENT
Start: 2019-01-01 | End: 2019-01-01 | Stop reason: SDUPTHER

## 2019-01-01 RX ORDER — DOXYCYCLINE HYCLATE 100 MG/1
100 CAPSULE ORAL ONCE
Status: COMPLETED | OUTPATIENT
Start: 2019-01-01 | End: 2019-01-01

## 2019-01-01 RX ORDER — DEXTROSE MONOHYDRATE 25 G/50ML
12.5 INJECTION, SOLUTION INTRAVENOUS PRN
Status: DISCONTINUED | OUTPATIENT
Start: 2019-01-01 | End: 2019-01-01 | Stop reason: HOSPADM

## 2019-01-01 RX ORDER — DILTIAZEM HYDROCHLORIDE 180 MG/1
180 CAPSULE, COATED, EXTENDED RELEASE ORAL DAILY
Status: DISCONTINUED | OUTPATIENT
Start: 2019-01-01 | End: 2019-01-01 | Stop reason: HOSPADM

## 2019-01-01 RX ORDER — MIDAZOLAM HYDROCHLORIDE 1 MG/ML
INJECTION INTRAMUSCULAR; INTRAVENOUS
Status: DISPENSED
Start: 2019-01-01 | End: 2019-01-01

## 2019-01-01 RX ORDER — ONDANSETRON 2 MG/ML
4 INJECTION INTRAMUSCULAR; INTRAVENOUS ONCE
Status: DISCONTINUED | OUTPATIENT
Start: 2019-01-01 | End: 2019-01-01 | Stop reason: HOSPADM

## 2019-01-01 RX ORDER — FORMOTEROL FUMARATE 20 UG/2ML
20 SOLUTION RESPIRATORY (INHALATION) 2 TIMES DAILY
Status: DISCONTINUED | OUTPATIENT
Start: 2019-01-01 | End: 2019-01-01 | Stop reason: HOSPADM

## 2019-01-01 RX ORDER — HYDRALAZINE HYDROCHLORIDE 20 MG/ML
10 INJECTION INTRAMUSCULAR; INTRAVENOUS EVERY 6 HOURS PRN
Status: DISCONTINUED | OUTPATIENT
Start: 2019-01-01 | End: 2019-01-01

## 2019-01-01 RX ORDER — ONDANSETRON 2 MG/ML
4 INJECTION INTRAMUSCULAR; INTRAVENOUS EVERY 6 HOURS PRN
Status: DISCONTINUED | OUTPATIENT
Start: 2019-01-01 | End: 2019-01-01

## 2019-01-01 RX ORDER — SCOLOPAMINE TRANSDERMAL SYSTEM 1 MG/1
1 PATCH, EXTENDED RELEASE TRANSDERMAL
Status: DISCONTINUED | OUTPATIENT
Start: 2019-01-01 | End: 2019-01-01 | Stop reason: HOSPADM

## 2019-01-01 RX ORDER — METOPROLOL TARTRATE 5 MG/5ML
5 INJECTION INTRAVENOUS ONCE
Status: COMPLETED | OUTPATIENT
Start: 2019-01-01 | End: 2019-01-01

## 2019-01-01 RX ORDER — LEVOTHYROXINE SODIUM 137 UG/1
137 TABLET ORAL DAILY
Status: DISCONTINUED | OUTPATIENT
Start: 2019-01-01 | End: 2019-01-01 | Stop reason: HOSPADM

## 2019-01-01 RX ORDER — ACETAMINOPHEN 325 MG/1
650 TABLET ORAL EVERY 6 HOURS PRN
Status: DISCONTINUED | OUTPATIENT
Start: 2019-01-01 | End: 2019-01-01 | Stop reason: HOSPADM

## 2019-01-01 RX ORDER — OYSTER SHELL CALCIUM WITH VITAMIN D 500; 200 MG/1; [IU]/1
1 TABLET, FILM COATED ORAL 2 TIMES DAILY
Status: DISCONTINUED | OUTPATIENT
Start: 2019-01-01 | End: 2019-01-01 | Stop reason: HOSPADM

## 2019-01-01 RX ORDER — PETROLATUM 42 G/100G
OINTMENT TOPICAL 2 TIMES DAILY PRN
Status: DISCONTINUED | OUTPATIENT
Start: 2019-01-01 | End: 2019-01-01 | Stop reason: HOSPADM

## 2019-01-01 RX ORDER — LANOLIN ALCOHOL/MO/W.PET/CERES
3 CREAM (GRAM) TOPICAL NIGHTLY PRN
Status: DISCONTINUED | OUTPATIENT
Start: 2019-01-01 | End: 2019-01-01

## 2019-01-01 RX ORDER — PANTOPRAZOLE SODIUM 40 MG/1
40 TABLET, DELAYED RELEASE ORAL DAILY
Status: DISCONTINUED | OUTPATIENT
Start: 2019-01-01 | End: 2019-01-01 | Stop reason: HOSPADM

## 2019-01-01 RX ORDER — SODIUM CHLORIDE 9 MG/ML
INJECTION, SOLUTION INTRAVENOUS CONTINUOUS
Status: DISCONTINUED | OUTPATIENT
Start: 2019-01-01 | End: 2019-01-01

## 2019-01-01 RX ORDER — FUROSEMIDE 20 MG/1
40 TABLET ORAL DAILY
Qty: 60 TABLET | Refills: 1 | DISCHARGE
Start: 2019-01-01

## 2019-01-01 RX ORDER — PREDNISONE 10 MG/1
TABLET ORAL
Qty: 30 TABLET | Refills: 0 | Status: SHIPPED | OUTPATIENT
Start: 2019-01-01 | End: 2019-01-01

## 2019-01-01 RX ORDER — 0.9 % SODIUM CHLORIDE 0.9 %
1000 INTRAVENOUS SOLUTION INTRAVENOUS ONCE
Status: DISCONTINUED | OUTPATIENT
Start: 2019-01-01 | End: 2019-01-01 | Stop reason: HOSPADM

## 2019-01-01 RX ORDER — ONDANSETRON 2 MG/ML
4 INJECTION INTRAMUSCULAR; INTRAVENOUS EVERY 6 HOURS PRN
Status: DISCONTINUED | OUTPATIENT
Start: 2019-01-01 | End: 2019-01-01 | Stop reason: HOSPADM

## 2019-01-01 RX ORDER — IPRATROPIUM BROMIDE AND ALBUTEROL SULFATE 2.5; .5 MG/3ML; MG/3ML
1 SOLUTION RESPIRATORY (INHALATION)
Status: DISCONTINUED | OUTPATIENT
Start: 2019-01-01 | End: 2019-01-01

## 2019-01-01 RX ORDER — AMOXICILLIN AND CLAVULANATE POTASSIUM 875; 125 MG/1; MG/1
1 TABLET, FILM COATED ORAL 2 TIMES DAILY
Qty: 8 TABLET | Refills: 0 | Status: SHIPPED | OUTPATIENT
Start: 2019-01-01 | End: 2019-01-01 | Stop reason: HOSPADM

## 2019-01-01 RX ORDER — SULFAMETHOXAZOLE AND TRIMETHOPRIM 800; 160 MG/1; MG/1
1 TABLET ORAL 2 TIMES DAILY
Status: COMPLETED | OUTPATIENT
Start: 2019-01-01 | End: 2019-01-01

## 2019-01-01 RX ORDER — CEFAZOLIN SODIUM 2 G/50ML
2 SOLUTION INTRAVENOUS
Status: COMPLETED | OUTPATIENT
Start: 2019-01-01 | End: 2019-01-01

## 2019-01-01 RX ORDER — MAGNESIUM SULFATE 1 G/100ML
1 INJECTION INTRAVENOUS ONCE
Status: COMPLETED | OUTPATIENT
Start: 2019-01-01 | End: 2019-01-01

## 2019-01-01 RX ORDER — LANOLIN ALCOHOL/MO/W.PET/CERES
3 CREAM (GRAM) TOPICAL NIGHTLY PRN
Status: DISCONTINUED | OUTPATIENT
Start: 2019-01-01 | End: 2019-01-01 | Stop reason: HOSPADM

## 2019-01-01 RX ORDER — LORAZEPAM 2 MG/ML
INJECTION INTRAMUSCULAR
Status: COMPLETED
Start: 2019-01-01 | End: 2019-01-01

## 2019-01-01 RX ORDER — POLYETHYLENE GLYCOL 3350 17 G/17G
17 POWDER, FOR SOLUTION ORAL DAILY PRN
COMMUNITY

## 2019-01-01 RX ORDER — HYDROCODONE BITARTRATE AND ACETAMINOPHEN 5; 325 MG/1; MG/1
1 TABLET ORAL EVERY 6 HOURS PRN
Status: DISCONTINUED | OUTPATIENT
Start: 2019-01-01 | End: 2019-01-01

## 2019-01-01 RX ORDER — METHYLPREDNISOLONE SODIUM SUCCINATE 40 MG/ML
40 INJECTION, POWDER, LYOPHILIZED, FOR SOLUTION INTRAMUSCULAR; INTRAVENOUS ONCE
Status: COMPLETED | OUTPATIENT
Start: 2019-01-01 | End: 2019-01-01

## 2019-01-01 RX ORDER — POTASSIUM CHLORIDE 29.8 MG/ML
20 INJECTION INTRAVENOUS ONCE
Status: COMPLETED | OUTPATIENT
Start: 2019-01-01 | End: 2019-01-01

## 2019-01-01 RX ORDER — PROPOFOL 10 MG/ML
INJECTION, EMULSION INTRAVENOUS CONTINUOUS PRN
Status: DISCONTINUED | OUTPATIENT
Start: 2019-01-01 | End: 2019-01-01 | Stop reason: SDUPTHER

## 2019-01-01 RX ORDER — FENTANYL CITRATE 50 UG/ML
25 INJECTION, SOLUTION INTRAMUSCULAR; INTRAVENOUS ONCE
Status: COMPLETED | OUTPATIENT
Start: 2019-01-01 | End: 2019-01-01

## 2019-01-01 RX ORDER — 0.9 % SODIUM CHLORIDE 0.9 %
10 VIAL (ML) INJECTION DAILY
Status: DISCONTINUED | OUTPATIENT
Start: 2019-01-01 | End: 2019-01-01

## 2019-01-01 RX ORDER — SODIUM CHLORIDE 9 MG/ML
INJECTION, SOLUTION INTRAVENOUS CONTINUOUS
Status: DISCONTINUED | OUTPATIENT
Start: 2019-01-01 | End: 2019-01-01 | Stop reason: SDUPTHER

## 2019-01-01 RX ORDER — MORPHINE SULFATE 4 MG/ML
INJECTION, SOLUTION INTRAMUSCULAR; INTRAVENOUS
Status: DISPENSED
Start: 2019-01-01 | End: 2019-01-01

## 2019-01-01 RX ORDER — AMOXICILLIN AND CLAVULANATE POTASSIUM 875; 125 MG/1; MG/1
1 TABLET, FILM COATED ORAL EVERY 12 HOURS SCHEDULED
Status: DISCONTINUED | OUTPATIENT
Start: 2019-01-01 | End: 2019-01-01 | Stop reason: HOSPADM

## 2019-01-01 RX ORDER — NADOLOL 20 MG/1
20 TABLET ORAL 2 TIMES DAILY
COMMUNITY

## 2019-01-01 RX ORDER — IBUPROFEN 200 MG
325 TABLET ORAL DAILY
Refills: 0 | Status: ON HOLD | COMMUNITY
Start: 2019-01-01 | End: 2019-01-01 | Stop reason: HOSPADM

## 2019-01-01 RX ORDER — SODIUM CHLORIDE, SODIUM LACTATE, POTASSIUM CHLORIDE, CALCIUM CHLORIDE 600; 310; 30; 20 MG/100ML; MG/100ML; MG/100ML; MG/100ML
INJECTION, SOLUTION INTRAVENOUS CONTINUOUS
Status: DISCONTINUED | OUTPATIENT
Start: 2019-01-01 | End: 2019-01-01

## 2019-01-01 RX ORDER — LORAZEPAM 2 MG/ML
2 INJECTION INTRAMUSCULAR ONCE
Status: COMPLETED | OUTPATIENT
Start: 2019-01-01 | End: 2019-01-01

## 2019-01-01 RX ORDER — HYDROCODONE BITARTRATE AND ACETAMINOPHEN 5; 325 MG/1; MG/1
1 TABLET ORAL EVERY 6 HOURS PRN
Status: DISCONTINUED | OUTPATIENT
Start: 2019-01-01 | End: 2019-01-01 | Stop reason: HOSPADM

## 2019-01-01 RX ORDER — AMIODARONE HYDROCHLORIDE 200 MG/1
200 TABLET ORAL 2 TIMES DAILY
Qty: 60 TABLET | Refills: 1 | Status: ON HOLD | OUTPATIENT
Start: 2019-01-01 | End: 2019-01-01 | Stop reason: SDUPTHER

## 2019-01-01 RX ORDER — OXYCODONE HYDROCHLORIDE AND ACETAMINOPHEN 5; 325 MG/1; MG/1
1 TABLET ORAL EVERY 4 HOURS PRN
Status: DISCONTINUED | OUTPATIENT
Start: 2019-01-01 | End: 2019-01-01 | Stop reason: HOSPADM

## 2019-01-01 RX ORDER — LANOLIN ALCOHOL/MO/W.PET/CERES
3 CREAM (GRAM) TOPICAL NIGHTLY PRN
Status: DISCONTINUED | OUTPATIENT
Start: 2019-01-01 | End: 2019-01-01 | Stop reason: SDUPTHER

## 2019-01-01 RX ORDER — AMIODARONE HYDROCHLORIDE 200 MG/1
200 TABLET ORAL DAILY
Status: DISCONTINUED | OUTPATIENT
Start: 2019-01-01 | End: 2019-01-01

## 2019-01-01 RX ORDER — HYDRALAZINE HYDROCHLORIDE 20 MG/ML
10 INJECTION INTRAMUSCULAR; INTRAVENOUS EVERY 30 MIN PRN
Status: DISCONTINUED | OUTPATIENT
Start: 2019-01-01 | End: 2019-01-01 | Stop reason: HOSPADM

## 2019-01-01 RX ORDER — SODIUM CHLORIDE 0.9 % (FLUSH) 0.9 %
SYRINGE (ML) INJECTION
Status: COMPLETED
Start: 2019-01-01 | End: 2019-01-01

## 2019-01-01 RX ORDER — CETIRIZINE HYDROCHLORIDE 10 MG/1
10 TABLET ORAL DAILY
Status: DISCONTINUED | OUTPATIENT
Start: 2019-01-01 | End: 2019-01-01 | Stop reason: HOSPADM

## 2019-01-01 RX ORDER — HYDRALAZINE HYDROCHLORIDE 25 MG/1
50 TABLET, FILM COATED ORAL EVERY 8 HOURS SCHEDULED
Status: DISCONTINUED | OUTPATIENT
Start: 2019-01-01 | End: 2019-01-01 | Stop reason: HOSPADM

## 2019-01-01 RX ORDER — DOCUSATE SODIUM 100 MG/1
100 CAPSULE, LIQUID FILLED ORAL 2 TIMES DAILY
Status: DISCONTINUED | OUTPATIENT
Start: 2019-01-01 | End: 2019-01-01 | Stop reason: HOSPADM

## 2019-01-01 RX ORDER — 0.9 % SODIUM CHLORIDE 0.9 %
250 INTRAVENOUS SOLUTION INTRAVENOUS ONCE
Status: DISCONTINUED | OUTPATIENT
Start: 2019-01-01 | End: 2019-01-01

## 2019-01-01 RX ORDER — LIDOCAINE HYDROCHLORIDE 10 MG/ML
5 INJECTION, SOLUTION EPIDURAL; INFILTRATION; INTRACAUDAL; PERINEURAL ONCE
Status: COMPLETED | OUTPATIENT
Start: 2019-01-01 | End: 2019-01-01

## 2019-01-01 RX ORDER — ETOMIDATE 2 MG/ML
20 INJECTION INTRAVENOUS ONCE
Status: COMPLETED | OUTPATIENT
Start: 2019-01-01 | End: 2019-01-01

## 2019-01-01 RX ORDER — METHYLPREDNISOLONE SODIUM SUCCINATE 40 MG/ML
40 INJECTION, POWDER, LYOPHILIZED, FOR SOLUTION INTRAMUSCULAR; INTRAVENOUS 3 TIMES DAILY
Status: DISCONTINUED | OUTPATIENT
Start: 2019-01-01 | End: 2019-01-01

## 2019-01-01 RX ORDER — ROCURONIUM BROMIDE 10 MG/ML
INJECTION, SOLUTION INTRAVENOUS
Status: DISPENSED
Start: 2019-01-01 | End: 2019-01-01

## 2019-01-01 RX ORDER — FENOFIBRATE 54 MG/1
54 TABLET ORAL DAILY
Status: DISCONTINUED | OUTPATIENT
Start: 2019-01-01 | End: 2019-01-01 | Stop reason: HOSPADM

## 2019-01-01 RX ORDER — MORPHINE SULFATE 4 MG/ML
4 INJECTION, SOLUTION INTRAMUSCULAR; INTRAVENOUS ONCE
Status: DISCONTINUED | OUTPATIENT
Start: 2019-01-01 | End: 2019-01-01 | Stop reason: HOSPADM

## 2019-01-01 RX ORDER — HYDRALAZINE HYDROCHLORIDE 50 MG/1
50 TABLET, FILM COATED ORAL EVERY 8 HOURS SCHEDULED
Qty: 90 TABLET | Refills: 1 | Status: SHIPPED | OUTPATIENT
Start: 2019-01-01

## 2019-01-01 RX ORDER — MORPHINE SULFATE 4 MG/ML
4 INJECTION, SOLUTION INTRAMUSCULAR; INTRAVENOUS
Status: DISCONTINUED | OUTPATIENT
Start: 2019-01-01 | End: 2019-01-01

## 2019-01-01 RX ORDER — MORPHINE SULFATE 2 MG/ML
2 INJECTION, SOLUTION INTRAMUSCULAR; INTRAVENOUS
Status: DISCONTINUED | OUTPATIENT
Start: 2019-01-01 | End: 2019-01-01 | Stop reason: HOSPADM

## 2019-01-01 RX ORDER — MORPHINE SULFATE 2 MG/ML
2 INJECTION, SOLUTION INTRAMUSCULAR; INTRAVENOUS
Status: DISCONTINUED | OUTPATIENT
Start: 2019-01-01 | End: 2019-01-01

## 2019-01-01 RX ORDER — FLUTICASONE FUROATE, UMECLIDINIUM BROMIDE AND VILANTEROL TRIFENATATE 100; 62.5; 25 UG/1; UG/1; UG/1
POWDER RESPIRATORY (INHALATION)
Qty: 1 EACH | Refills: 5 | Status: SHIPPED | OUTPATIENT
Start: 2019-01-01

## 2019-01-01 RX ORDER — FENOFIBRATE 160 MG/1
160 TABLET ORAL DAILY
Status: DISCONTINUED | OUTPATIENT
Start: 2019-01-01 | End: 2019-01-01 | Stop reason: HOSPADM

## 2019-01-01 RX ORDER — GLYCOPYRROLATE 0.2 MG/ML
0.2 INJECTION INTRAMUSCULAR; INTRAVENOUS EVERY 4 HOURS PRN
Status: DISCONTINUED | OUTPATIENT
Start: 2019-01-01 | End: 2019-01-01 | Stop reason: HOSPADM

## 2019-01-01 RX ORDER — HYDRALAZINE HYDROCHLORIDE 50 MG/1
50 TABLET, FILM COATED ORAL EVERY 8 HOURS SCHEDULED
Qty: 90 TABLET | Refills: 1 | Status: SHIPPED | OUTPATIENT
Start: 2019-01-01 | End: 2019-01-01

## 2019-01-01 RX ORDER — AMIODARONE HYDROCHLORIDE 200 MG/1
200 TABLET ORAL 2 TIMES DAILY
Qty: 60 TABLET | Refills: 1 | Status: SHIPPED | OUTPATIENT
Start: 2019-01-01 | End: 2019-01-01

## 2019-01-01 RX ORDER — PANTOPRAZOLE SODIUM 40 MG/10ML
40 INJECTION, POWDER, LYOPHILIZED, FOR SOLUTION INTRAVENOUS DAILY
Status: DISCONTINUED | OUTPATIENT
Start: 2019-01-01 | End: 2019-01-01 | Stop reason: HOSPADM

## 2019-01-01 RX ORDER — FENTANYL CITRATE 50 UG/ML
50 INJECTION, SOLUTION INTRAMUSCULAR; INTRAVENOUS ONCE
Status: COMPLETED | OUTPATIENT
Start: 2019-01-01 | End: 2019-01-01

## 2019-01-01 RX ORDER — ACETAMINOPHEN 650 MG/1
650 SUPPOSITORY RECTAL ONCE
Status: COMPLETED | OUTPATIENT
Start: 2019-01-01 | End: 2019-01-01

## 2019-01-01 RX ORDER — METHYLPREDNISOLONE SODIUM SUCCINATE 40 MG/ML
40 INJECTION, POWDER, LYOPHILIZED, FOR SOLUTION INTRAMUSCULAR; INTRAVENOUS EVERY 12 HOURS
Status: DISCONTINUED | OUTPATIENT
Start: 2019-01-01 | End: 2019-01-01

## 2019-01-01 RX ORDER — METHYLPREDNISOLONE SODIUM SUCCINATE 40 MG/ML
40 INJECTION, POWDER, LYOPHILIZED, FOR SOLUTION INTRAMUSCULAR; INTRAVENOUS DAILY
Status: DISCONTINUED | OUTPATIENT
Start: 2019-01-01 | End: 2019-01-01

## 2019-01-01 RX ORDER — FENTANYL CITRATE 50 UG/ML
INJECTION, SOLUTION INTRAMUSCULAR; INTRAVENOUS PRN
Status: DISCONTINUED | OUTPATIENT
Start: 2019-01-01 | End: 2019-01-01 | Stop reason: SDUPTHER

## 2019-01-01 RX ORDER — OXYCODONE HYDROCHLORIDE 10 MG/1
20 TABLET ORAL EVERY 6 HOURS PRN
Status: DISCONTINUED | OUTPATIENT
Start: 2019-01-01 | End: 2019-01-01 | Stop reason: HOSPADM

## 2019-01-01 RX ORDER — HEPARIN SODIUM (PORCINE) LOCK FLUSH IV SOLN 100 UNIT/ML 100 UNIT/ML
3 SOLUTION INTRAVENOUS EVERY 12 HOURS SCHEDULED
Status: DISCONTINUED | OUTPATIENT
Start: 2019-01-01 | End: 2019-01-01

## 2019-01-01 RX ORDER — SODIUM CHLORIDE 0.9 % (FLUSH) 0.9 %
10 SYRINGE (ML) INJECTION PRN
Status: DISCONTINUED | OUTPATIENT
Start: 2019-01-01 | End: 2019-01-01

## 2019-01-01 RX ORDER — IPRATROPIUM BROMIDE AND ALBUTEROL SULFATE 2.5; .5 MG/3ML; MG/3ML
1 SOLUTION RESPIRATORY (INHALATION) 4 TIMES DAILY
Status: DISCONTINUED | OUTPATIENT
Start: 2019-01-01 | End: 2019-01-01 | Stop reason: HOSPADM

## 2019-01-01 RX ORDER — FENTANYL CITRATE 50 UG/ML
INJECTION, SOLUTION INTRAMUSCULAR; INTRAVENOUS
Status: COMPLETED
Start: 2019-01-01 | End: 2019-01-01

## 2019-01-01 RX ORDER — HEPARIN SODIUM (PORCINE) LOCK FLUSH IV SOLN 100 UNIT/ML 100 UNIT/ML
3 SOLUTION INTRAVENOUS PRN
Status: DISCONTINUED | OUTPATIENT
Start: 2019-01-01 | End: 2019-01-01 | Stop reason: HOSPADM

## 2019-01-01 RX ORDER — FUROSEMIDE 10 MG/ML
20 INJECTION INTRAMUSCULAR; INTRAVENOUS ONCE
Status: DISCONTINUED | OUTPATIENT
Start: 2019-01-01 | End: 2019-01-01

## 2019-01-01 RX ORDER — LANOLIN ALCOHOL/MO/W.PET/CERES
3 CREAM (GRAM) TOPICAL NIGHTLY PRN
Status: CANCELLED | OUTPATIENT
Start: 2019-01-01

## 2019-01-01 RX ORDER — HEPARIN SODIUM 10000 [USP'U]/ML
5000 INJECTION, SOLUTION INTRAVENOUS; SUBCUTANEOUS EVERY 8 HOURS
Status: DISCONTINUED | OUTPATIENT
Start: 2019-01-01 | End: 2019-01-01

## 2019-01-01 RX ORDER — SODIUM CHLORIDE 0.9 % (FLUSH) 0.9 %
10 SYRINGE (ML) INJECTION EVERY 12 HOURS SCHEDULED
Status: DISCONTINUED | OUTPATIENT
Start: 2019-01-01 | End: 2019-01-01 | Stop reason: SDUPTHER

## 2019-01-01 RX ORDER — MORPHINE SULFATE 2 MG/ML
INJECTION, SOLUTION INTRAMUSCULAR; INTRAVENOUS
Status: COMPLETED
Start: 2019-01-01 | End: 2019-01-01

## 2019-01-01 RX ORDER — SULFAMETHOXAZOLE AND TRIMETHOPRIM 800; 160 MG/1; MG/1
1 TABLET ORAL 2 TIMES DAILY
Qty: 10 TABLET | Refills: 0 | Status: SHIPPED | OUTPATIENT
Start: 2019-01-01 | End: 2019-01-01 | Stop reason: SDUPTHER

## 2019-01-01 RX ORDER — FUROSEMIDE 10 MG/ML
20 INJECTION INTRAMUSCULAR; INTRAVENOUS ONCE
Status: COMPLETED | OUTPATIENT
Start: 2019-01-01 | End: 2019-01-01

## 2019-01-01 RX ORDER — IPRATROPIUM BROMIDE AND ALBUTEROL SULFATE 2.5; .5 MG/3ML; MG/3ML
1 SOLUTION RESPIRATORY (INHALATION)
Status: DISCONTINUED | OUTPATIENT
Start: 2019-01-01 | End: 2019-01-01 | Stop reason: SDUPTHER

## 2019-01-01 RX ORDER — 0.9 % SODIUM CHLORIDE 0.9 %
10 VIAL (ML) INJECTION 2 TIMES DAILY
Status: DISCONTINUED | OUTPATIENT
Start: 2019-01-01 | End: 2019-01-01 | Stop reason: HOSPADM

## 2019-01-01 RX ORDER — DIPHENHYDRAMINE HCL 25 MG
25 TABLET ORAL NIGHTLY PRN
Status: DISCONTINUED | OUTPATIENT
Start: 2019-01-01 | End: 2019-01-01

## 2019-01-01 RX ORDER — OXYCODONE HYDROCHLORIDE 10 MG/1
20 TABLET ORAL EVERY 6 HOURS PRN
Status: DISCONTINUED | OUTPATIENT
Start: 2019-01-01 | End: 2019-01-01

## 2019-01-01 RX ORDER — MORPHINE SULFATE 2 MG/ML
2 INJECTION, SOLUTION INTRAMUSCULAR; INTRAVENOUS ONCE
Status: COMPLETED | OUTPATIENT
Start: 2019-01-01 | End: 2019-01-01

## 2019-01-01 RX ORDER — OXYCODONE HYDROCHLORIDE 10 MG/1
20 TABLET ORAL EVERY 6 HOURS PRN
Status: DISCONTINUED | OUTPATIENT
Start: 2019-01-01 | End: 2019-01-01 | Stop reason: SDUPTHER

## 2019-01-01 RX ORDER — FUROSEMIDE 20 MG/1
20 TABLET ORAL DAILY
Status: DISCONTINUED | OUTPATIENT
Start: 2019-01-01 | End: 2019-01-01

## 2019-01-01 RX ORDER — PANTOPRAZOLE SODIUM 40 MG/10ML
40 INJECTION, POWDER, LYOPHILIZED, FOR SOLUTION INTRAVENOUS DAILY
Status: DISCONTINUED | OUTPATIENT
Start: 2019-01-01 | End: 2019-01-01 | Stop reason: ALTCHOICE

## 2019-01-01 RX ORDER — ROCURONIUM BROMIDE 10 MG/ML
100 INJECTION, SOLUTION INTRAVENOUS ONCE
Status: COMPLETED | OUTPATIENT
Start: 2019-01-01 | End: 2019-01-01

## 2019-01-01 RX ORDER — IPRATROPIUM BROMIDE AND ALBUTEROL SULFATE 2.5; .5 MG/3ML; MG/3ML
1 SOLUTION RESPIRATORY (INHALATION)
Status: DISCONTINUED | OUTPATIENT
Start: 2019-01-01 | End: 2019-01-01 | Stop reason: HOSPADM

## 2019-01-01 RX ORDER — SODIUM CHLORIDE, SODIUM LACTATE, POTASSIUM CHLORIDE, CALCIUM CHLORIDE 600; 310; 30; 20 MG/100ML; MG/100ML; MG/100ML; MG/100ML
INJECTION, SOLUTION INTRAVENOUS CONTINUOUS
Status: ACTIVE | OUTPATIENT
Start: 2019-01-01 | End: 2019-01-01

## 2019-01-01 RX ORDER — HEPARIN SODIUM 10000 [USP'U]/ML
INJECTION, SOLUTION INTRAVENOUS; SUBCUTANEOUS PRN
Status: DISCONTINUED | OUTPATIENT
Start: 2019-01-01 | End: 2019-01-01 | Stop reason: SDUPTHER

## 2019-01-01 RX ORDER — LANOLIN ALCOHOL/MO/W.PET/CERES
3 CREAM (GRAM) TOPICAL NIGHTLY PRN
COMMUNITY

## 2019-01-01 RX ORDER — SULFAMETHOXAZOLE AND TRIMETHOPRIM 800; 160 MG/1; MG/1
1 TABLET ORAL 2 TIMES DAILY
Qty: 6 TABLET | Refills: 0 | Status: ON HOLD | OUTPATIENT
Start: 2019-01-01 | End: 2019-01-01 | Stop reason: HOSPADM

## 2019-01-01 RX ORDER — HEPARIN SODIUM 10000 [USP'U]/ML
5000 INJECTION, SOLUTION INTRAVENOUS; SUBCUTANEOUS EVERY 8 HOURS SCHEDULED
Status: DISCONTINUED | OUTPATIENT
Start: 2019-01-01 | End: 2019-01-01

## 2019-01-01 RX ORDER — PROPOFOL 10 MG/ML
INJECTION, EMULSION INTRAVENOUS
Status: COMPLETED
Start: 2019-01-01 | End: 2019-01-01

## 2019-01-01 RX ORDER — FUROSEMIDE 10 MG/ML
40 INJECTION INTRAMUSCULAR; INTRAVENOUS ONCE
Status: COMPLETED | OUTPATIENT
Start: 2019-01-01 | End: 2019-01-01

## 2019-01-01 RX ORDER — 0.9 % SODIUM CHLORIDE 0.9 %
500 INTRAVENOUS SOLUTION INTRAVENOUS ONCE
Status: COMPLETED | OUTPATIENT
Start: 2019-01-01 | End: 2019-01-01

## 2019-01-01 RX ORDER — IPRATROPIUM BROMIDE AND ALBUTEROL SULFATE 2.5; .5 MG/3ML; MG/3ML
1 SOLUTION RESPIRATORY (INHALATION)
Status: COMPLETED | OUTPATIENT
Start: 2019-01-01 | End: 2019-01-01

## 2019-01-01 RX ORDER — PANTOPRAZOLE SODIUM 40 MG/10ML
40 INJECTION, POWDER, LYOPHILIZED, FOR SOLUTION INTRAVENOUS DAILY
Status: DISCONTINUED | OUTPATIENT
Start: 2019-01-01 | End: 2019-01-01

## 2019-01-01 RX ORDER — AMIODARONE HYDROCHLORIDE 200 MG/1
200 TABLET ORAL DAILY
Status: ON HOLD | COMMUNITY
End: 2019-01-01 | Stop reason: HOSPADM

## 2019-01-01 RX ORDER — BISACODYL 10 MG
10 SUPPOSITORY, RECTAL RECTAL DAILY
Status: DISCONTINUED | OUTPATIENT
Start: 2019-01-01 | End: 2019-01-01 | Stop reason: HOSPADM

## 2019-01-01 RX ORDER — OXYCODONE HYDROCHLORIDE 20 MG/1
20 TABLET ORAL EVERY 6 HOURS PRN
COMMUNITY

## 2019-01-01 RX ORDER — CYANOCOBALAMIN 1000 UG/ML
1000 INJECTION INTRAMUSCULAR; SUBCUTANEOUS ONCE
Status: COMPLETED | OUTPATIENT
Start: 2019-01-01 | End: 2019-01-01

## 2019-01-01 RX ORDER — BUDESONIDE 0.25 MG/2ML
250 INHALANT ORAL 2 TIMES DAILY
Status: DISCONTINUED | OUTPATIENT
Start: 2019-01-01 | End: 2019-01-01 | Stop reason: HOSPADM

## 2019-01-01 RX ORDER — NADOLOL 20 MG/1
20 TABLET ORAL 2 TIMES DAILY
Qty: 60 TABLET | Refills: 1 | Status: SHIPPED | OUTPATIENT
Start: 2019-01-01 | End: 2019-01-01

## 2019-01-01 RX ORDER — ACETAMINOPHEN 325 MG/1
650 TABLET ORAL ONCE
Status: COMPLETED | OUTPATIENT
Start: 2019-01-01 | End: 2019-01-01

## 2019-01-01 RX ORDER — NADOLOL 20 MG/1
20 TABLET ORAL 2 TIMES DAILY
Qty: 60 TABLET | Refills: 1 | Status: ON HOLD | OUTPATIENT
Start: 2019-01-01 | End: 2019-01-01 | Stop reason: SDUPTHER

## 2019-01-01 RX ORDER — CLONAZEPAM 1 MG/1
1 TABLET ORAL 2 TIMES DAILY PRN
COMMUNITY

## 2019-01-01 RX ORDER — ALBUTEROL SULFATE 2.5 MG/3ML
2.5 SOLUTION RESPIRATORY (INHALATION) ONCE
Status: COMPLETED | OUTPATIENT
Start: 2019-01-01 | End: 2019-01-01

## 2019-01-01 RX ORDER — NADOLOL 20 MG/1
20 TABLET ORAL 2 TIMES DAILY
Status: DISCONTINUED | OUTPATIENT
Start: 2019-01-01 | End: 2019-01-01 | Stop reason: HOSPADM

## 2019-01-01 RX ORDER — LORAZEPAM 2 MG/ML
1 INJECTION INTRAMUSCULAR
Status: DISCONTINUED | OUTPATIENT
Start: 2019-01-01 | End: 2019-01-01 | Stop reason: HOSPADM

## 2019-01-01 RX ORDER — HYDROCODONE BITARTRATE AND ACETAMINOPHEN 5; 325 MG/1; MG/1
1 TABLET ORAL ONCE
Status: COMPLETED | OUTPATIENT
Start: 2019-01-01 | End: 2019-01-01

## 2019-01-01 RX ORDER — FLUTICASONE PROPIONATE 110 UG/1
1 AEROSOL, METERED RESPIRATORY (INHALATION) 2 TIMES DAILY
Status: DISCONTINUED | OUTPATIENT
Start: 2019-01-01 | End: 2019-01-01 | Stop reason: HOSPADM

## 2019-01-01 RX ORDER — HYDRALAZINE HYDROCHLORIDE 25 MG/1
25 TABLET, FILM COATED ORAL EVERY 8 HOURS SCHEDULED
Status: DISCONTINUED | OUTPATIENT
Start: 2019-01-01 | End: 2019-01-01

## 2019-01-01 RX ADMIN — MORPHINE SULFATE 2 MG: 2 INJECTION, SOLUTION INTRAMUSCULAR; INTRAVENOUS at 10:57

## 2019-01-01 RX ADMIN — IPRATROPIUM BROMIDE AND ALBUTEROL SULFATE 1 AMPULE: .5; 3 SOLUTION RESPIRATORY (INHALATION) at 08:21

## 2019-01-01 RX ADMIN — METHYLPREDNISOLONE SODIUM SUCCINATE 40 MG: 40 INJECTION, POWDER, FOR SOLUTION INTRAMUSCULAR; INTRAVENOUS at 13:41

## 2019-01-01 RX ADMIN — INSULIN LISPRO 2 UNITS: 100 INJECTION, SOLUTION INTRAVENOUS; SUBCUTANEOUS at 16:37

## 2019-01-01 RX ADMIN — LEVOTHYROXINE SODIUM 137 MCG: 137 TABLET ORAL at 06:17

## 2019-01-01 RX ADMIN — HYDRALAZINE HYDROCHLORIDE 50 MG: 50 TABLET, FILM COATED ORAL at 17:05

## 2019-01-01 RX ADMIN — LEVOTHYROXINE SODIUM 137 MCG: 137 TABLET ORAL at 06:03

## 2019-01-01 RX ADMIN — FORMOTEROL FUMARATE DIHYDRATE 20 MCG: 20 SOLUTION RESPIRATORY (INHALATION) at 19:50

## 2019-01-01 RX ADMIN — LEVOTHYROXINE SODIUM 137 MCG: 137 TABLET ORAL at 06:30

## 2019-01-01 RX ADMIN — FUROSEMIDE 20 MG: 20 TABLET ORAL at 09:38

## 2019-01-01 RX ADMIN — CLOPIDOGREL 75 MG: 75 TABLET, FILM COATED ORAL at 09:37

## 2019-01-01 RX ADMIN — LEVOTHYROXINE SODIUM 137 MCG: 137 TABLET ORAL at 06:14

## 2019-01-01 RX ADMIN — Medication 10 ML: at 20:40

## 2019-01-01 RX ADMIN — VANCOMYCIN HYDROCHLORIDE 1250 MG: 10 INJECTION, POWDER, LYOPHILIZED, FOR SOLUTION INTRAVENOUS at 18:39

## 2019-01-01 RX ADMIN — Medication 10 ML: at 01:36

## 2019-01-01 RX ADMIN — Medication 10 ML: at 12:27

## 2019-01-01 RX ADMIN — INSULIN LISPRO 1 UNITS: 100 INJECTION, SOLUTION INTRAVENOUS; SUBCUTANEOUS at 20:52

## 2019-01-01 RX ADMIN — DILTIAZEM HYDROCHLORIDE 180 MG: 180 CAPSULE, EXTENDED RELEASE ORAL at 08:09

## 2019-01-01 RX ADMIN — Medication 10 ML: at 20:16

## 2019-01-01 RX ADMIN — Medication 10 ML: at 09:39

## 2019-01-01 RX ADMIN — SODIUM CHLORIDE: 9 INJECTION, SOLUTION INTRAVENOUS at 06:00

## 2019-01-01 RX ADMIN — CLONAZEPAM 0.5 MG: 0.5 TABLET ORAL at 09:37

## 2019-01-01 RX ADMIN — DILTIAZEM HYDROCHLORIDE 180 MG: 180 CAPSULE, EXTENDED RELEASE ORAL at 09:01

## 2019-01-01 RX ADMIN — CITALOPRAM 20 MG: 20 TABLET, FILM COATED ORAL at 22:27

## 2019-01-01 RX ADMIN — MOMETASONE FUROATE AND FORMOTEROL FUMARATE DIHYDRATE 2 PUFF: 100; 5 AEROSOL RESPIRATORY (INHALATION) at 21:30

## 2019-01-01 RX ADMIN — SODIUM CHLORIDE, POTASSIUM CHLORIDE, SODIUM LACTATE AND CALCIUM CHLORIDE: 600; 310; 30; 20 INJECTION, SOLUTION INTRAVENOUS at 06:21

## 2019-01-01 RX ADMIN — AMOXICILLIN AND CLAVULANATE POTASSIUM 1 TABLET: 875; 125 TABLET, FILM COATED ORAL at 08:33

## 2019-01-01 RX ADMIN — FUROSEMIDE 40 MG: 10 INJECTION, SOLUTION INTRAMUSCULAR; INTRAVENOUS at 22:59

## 2019-01-01 RX ADMIN — CLONAZEPAM 0.5 MG: 0.5 TABLET ORAL at 21:30

## 2019-01-01 RX ADMIN — HYDRALAZINE HYDROCHLORIDE 50 MG: 50 TABLET, FILM COATED ORAL at 16:43

## 2019-01-01 RX ADMIN — PIPERACILLIN AND TAZOBACTAM 4.5 G: 4; .5 INJECTION, POWDER, LYOPHILIZED, FOR SOLUTION INTRAVENOUS; PARENTERAL at 09:18

## 2019-01-01 RX ADMIN — FORMOTEROL FUMARATE DIHYDRATE 20 MCG: 20 SOLUTION RESPIRATORY (INHALATION) at 08:10

## 2019-01-01 RX ADMIN — Medication 10 ML: at 10:15

## 2019-01-01 RX ADMIN — FORMOTEROL FUMARATE DIHYDRATE 20 MCG: 20 SOLUTION RESPIRATORY (INHALATION) at 20:05

## 2019-01-01 RX ADMIN — INSULIN LISPRO 2 UNITS: 100 INJECTION, SOLUTION INTRAVENOUS; SUBCUTANEOUS at 21:59

## 2019-01-01 RX ADMIN — PROPOFOL 35 MCG/KG/MIN: 10 INJECTION, EMULSION INTRAVENOUS at 17:57

## 2019-01-01 RX ADMIN — SODIUM CHLORIDE 250 ML: 9 INJECTION, SOLUTION INTRAVENOUS at 15:32

## 2019-01-01 RX ADMIN — PROPOFOL 30 MCG/KG/MIN: 10 INJECTION, EMULSION INTRAVENOUS at 07:17

## 2019-01-01 RX ADMIN — CLOPIDOGREL 75 MG: 75 TABLET, FILM COATED ORAL at 09:58

## 2019-01-01 RX ADMIN — Medication 10 ML: at 08:38

## 2019-01-01 RX ADMIN — MORPHINE SULFATE 2 MG: 2 INJECTION, SOLUTION INTRAMUSCULAR; INTRAVENOUS at 06:20

## 2019-01-01 RX ADMIN — PIPERACILLIN SODIUM AND TAZOBACTAM SODIUM 3.38 G: 3; .375 INJECTION, POWDER, LYOPHILIZED, FOR SOLUTION INTRAVENOUS at 06:30

## 2019-01-01 RX ADMIN — CLOPIDOGREL 75 MG: 75 TABLET, FILM COATED ORAL at 08:49

## 2019-01-01 RX ADMIN — IPRATROPIUM BROMIDE AND ALBUTEROL SULFATE 1 AMPULE: .5; 3 SOLUTION RESPIRATORY (INHALATION) at 05:23

## 2019-01-01 RX ADMIN — IPRATROPIUM BROMIDE AND ALBUTEROL SULFATE 1 AMPULE: .5; 3 SOLUTION RESPIRATORY (INHALATION) at 16:07

## 2019-01-01 RX ADMIN — HYDROCODONE BITARTRATE AND ACETAMINOPHEN 1 TABLET: 5; 325 TABLET ORAL at 06:35

## 2019-01-01 RX ADMIN — PROPOFOL 25 MCG/KG/MIN: 10 INJECTION, EMULSION INTRAVENOUS at 17:58

## 2019-01-01 RX ADMIN — METHYLPREDNISOLONE SODIUM SUCCINATE 40 MG: 40 INJECTION, POWDER, FOR SOLUTION INTRAMUSCULAR; INTRAVENOUS at 17:49

## 2019-01-01 RX ADMIN — METHYLPREDNISOLONE SODIUM SUCCINATE 40 MG: 40 INJECTION, POWDER, FOR SOLUTION INTRAMUSCULAR; INTRAVENOUS at 09:00

## 2019-01-01 RX ADMIN — FENTANYL CITRATE 25 MCG: 50 INJECTION INTRAMUSCULAR; INTRAVENOUS at 13:51

## 2019-01-01 RX ADMIN — IPRATROPIUM BROMIDE AND ALBUTEROL SULFATE 1 AMPULE: .5; 3 SOLUTION RESPIRATORY (INHALATION) at 16:46

## 2019-01-01 RX ADMIN — IPRATROPIUM BROMIDE AND ALBUTEROL SULFATE 1 AMPULE: .5; 3 SOLUTION RESPIRATORY (INHALATION) at 12:51

## 2019-01-01 RX ADMIN — PANTOPRAZOLE SODIUM 40 MG: 40 INJECTION, POWDER, FOR SOLUTION INTRAVENOUS at 20:55

## 2019-01-01 RX ADMIN — Medication 10 ML: at 21:34

## 2019-01-01 RX ADMIN — PROPOFOL 30 MCG/KG/MIN: 10 INJECTION, EMULSION INTRAVENOUS at 00:22

## 2019-01-01 RX ADMIN — NADOLOL 20 MG: 20 TABLET ORAL at 21:21

## 2019-01-01 RX ADMIN — INSULIN LISPRO 3 UNITS: 100 INJECTION, SOLUTION INTRAVENOUS; SUBCUTANEOUS at 13:18

## 2019-01-01 RX ADMIN — AMOXICILLIN AND CLAVULANATE POTASSIUM 1 TABLET: 875; 125 TABLET, FILM COATED ORAL at 21:24

## 2019-01-01 RX ADMIN — FENTANYL CITRATE 25 MCG: 50 INJECTION, SOLUTION INTRAMUSCULAR; INTRAVENOUS at 12:56

## 2019-01-01 RX ADMIN — ACETAMINOPHEN 650 MG: 325 TABLET, FILM COATED ORAL at 12:22

## 2019-01-01 RX ADMIN — HEPARIN SODIUM 5000 UNITS: 10000 INJECTION INTRAVENOUS; SUBCUTANEOUS at 00:15

## 2019-01-01 RX ADMIN — MIDAZOLAM 4 MG: 1 INJECTION INTRAMUSCULAR; INTRAVENOUS at 19:46

## 2019-01-01 RX ADMIN — FORMOTEROL FUMARATE DIHYDRATE 20 MCG: 20 SOLUTION RESPIRATORY (INHALATION) at 08:21

## 2019-01-01 RX ADMIN — CLOPIDOGREL 75 MG: 75 TABLET, FILM COATED ORAL at 09:04

## 2019-01-01 RX ADMIN — CLONAZEPAM 0.5 MG: 0.5 TABLET ORAL at 21:10

## 2019-01-01 RX ADMIN — NADOLOL 20 MG: 20 TABLET ORAL at 09:53

## 2019-01-01 RX ADMIN — MIDAZOLAM 4 MG: 1 INJECTION INTRAMUSCULAR; INTRAVENOUS at 16:15

## 2019-01-01 RX ADMIN — FORMOTEROL FUMARATE DIHYDRATE 20 MCG: 20 SOLUTION RESPIRATORY (INHALATION) at 20:00

## 2019-01-01 RX ADMIN — Medication 10 ML: at 08:41

## 2019-01-01 RX ADMIN — SULFAMETHOXAZOLE AND TRIMETHOPRIM 365 MG: 80; 16 INJECTION, SOLUTION, CONCENTRATE INTRAVENOUS at 10:14

## 2019-01-01 RX ADMIN — PANTOPRAZOLE SODIUM 40 MG: 40 INJECTION, POWDER, FOR SOLUTION INTRAVENOUS at 10:26

## 2019-01-01 RX ADMIN — DOCUSATE SODIUM 100 MG: 100 CAPSULE, LIQUID FILLED ORAL at 21:02

## 2019-01-01 RX ADMIN — CLONAZEPAM 0.5 MG: 0.5 TABLET ORAL at 09:34

## 2019-01-01 RX ADMIN — HEPARIN SODIUM 5000 UNITS: 10000 INJECTION INTRAVENOUS; SUBCUTANEOUS at 17:06

## 2019-01-01 RX ADMIN — IPRATROPIUM BROMIDE AND ALBUTEROL SULFATE 1 AMPULE: .5; 3 SOLUTION RESPIRATORY (INHALATION) at 20:34

## 2019-01-01 RX ADMIN — INSULIN LISPRO 2 UNITS: 100 INJECTION, SOLUTION INTRAVENOUS; SUBCUTANEOUS at 21:22

## 2019-01-01 RX ADMIN — Medication 10 ML: at 16:30

## 2019-01-01 RX ADMIN — Medication 10 ML: at 21:04

## 2019-01-01 RX ADMIN — BUDESONIDE 250 MCG: 0.25 SUSPENSION RESPIRATORY (INHALATION) at 20:34

## 2019-01-01 RX ADMIN — FORMOTEROL FUMARATE DIHYDRATE 20 MCG: 20 SOLUTION RESPIRATORY (INHALATION) at 20:06

## 2019-01-01 RX ADMIN — IPRATROPIUM BROMIDE AND ALBUTEROL SULFATE 1 AMPULE: .5; 3 SOLUTION RESPIRATORY (INHALATION) at 17:53

## 2019-01-01 RX ADMIN — IPRATROPIUM BROMIDE AND ALBUTEROL SULFATE 1 AMPULE: .5; 3 SOLUTION RESPIRATORY (INHALATION) at 12:19

## 2019-01-01 RX ADMIN — ROFLUMILAST 500 MCG: 500 TABLET ORAL at 08:45

## 2019-01-01 RX ADMIN — Medication 10 ML: at 21:19

## 2019-01-01 RX ADMIN — AMIODARONE HYDROCHLORIDE 200 MG: 200 TABLET ORAL at 15:27

## 2019-01-01 RX ADMIN — HYDRALAZINE HYDROCHLORIDE 50 MG: 50 TABLET, FILM COATED ORAL at 05:00

## 2019-01-01 RX ADMIN — Medication 10 ML: at 09:37

## 2019-01-01 RX ADMIN — NADOLOL 20 MG: 20 TABLET ORAL at 15:28

## 2019-01-01 RX ADMIN — APIXABAN 5 MG: 5 TABLET, FILM COATED ORAL at 16:46

## 2019-01-01 RX ADMIN — LEVOTHYROXINE SODIUM 137 MCG: 137 TABLET ORAL at 20:25

## 2019-01-01 RX ADMIN — BUDESONIDE 250 MCG: 0.25 SUSPENSION RESPIRATORY (INHALATION) at 21:09

## 2019-01-01 RX ADMIN — Medication 10 ML: at 21:23

## 2019-01-01 RX ADMIN — IPRATROPIUM BROMIDE AND ALBUTEROL SULFATE 1 AMPULE: .5; 3 SOLUTION RESPIRATORY (INHALATION) at 16:25

## 2019-01-01 RX ADMIN — IPRATROPIUM BROMIDE AND ALBUTEROL SULFATE 1 AMPULE: .5; 3 SOLUTION RESPIRATORY (INHALATION) at 12:35

## 2019-01-01 RX ADMIN — CITALOPRAM 20 MG: 20 TABLET, FILM COATED ORAL at 21:30

## 2019-01-01 RX ADMIN — AMIODARONE HYDROCHLORIDE 200 MG: 200 TABLET ORAL at 09:03

## 2019-01-01 RX ADMIN — IPRATROPIUM BROMIDE AND ALBUTEROL SULFATE 1 AMPULE: .5; 3 SOLUTION RESPIRATORY (INHALATION) at 12:45

## 2019-01-01 RX ADMIN — MOMETASONE FUROATE AND FORMOTEROL FUMARATE DIHYDRATE 2 PUFF: 100; 5 AEROSOL RESPIRATORY (INHALATION) at 09:40

## 2019-01-01 RX ADMIN — SULFAMETHOXAZOLE AND TRIMETHOPRIM 365 MG: 80; 16 INJECTION, SOLUTION, CONCENTRATE INTRAVENOUS at 21:31

## 2019-01-01 RX ADMIN — Medication 10 ML: at 13:13

## 2019-01-01 RX ADMIN — OXYCODONE HYDROCHLORIDE 20 MG: 10 TABLET ORAL at 09:58

## 2019-01-01 RX ADMIN — CETIRIZINE HYDROCHLORIDE 10 MG: 10 TABLET, FILM COATED ORAL at 09:37

## 2019-01-01 RX ADMIN — SULFAMETHOXAZOLE AND TRIMETHOPRIM 365 MG: 80; 16 INJECTION, SOLUTION, CONCENTRATE INTRAVENOUS at 20:33

## 2019-01-01 RX ADMIN — CALCIUM CARBONATE-VITAMIN D TAB 500 MG-200 UNIT 1 TABLET: 500-200 TAB at 08:45

## 2019-01-01 RX ADMIN — ATORVASTATIN CALCIUM 80 MG: 40 TABLET, FILM COATED ORAL at 20:55

## 2019-01-01 RX ADMIN — FORMOTEROL FUMARATE DIHYDRATE 20 MCG: 20 SOLUTION RESPIRATORY (INHALATION) at 20:34

## 2019-01-01 RX ADMIN — SULFAMETHOXAZOLE AND TRIMETHOPRIM 365 MG: 80; 16 INJECTION, SOLUTION, CONCENTRATE INTRAVENOUS at 09:43

## 2019-01-01 RX ADMIN — GLYCOPYRROLATE AND FORMOTEROL FUMARATE 2 PUFF: 9; 4.8 AEROSOL, METERED RESPIRATORY (INHALATION) at 08:33

## 2019-01-01 RX ADMIN — Medication 10 ML: at 21:02

## 2019-01-01 RX ADMIN — AMOXICILLIN AND CLAVULANATE POTASSIUM 1 TABLET: 875; 125 TABLET, FILM COATED ORAL at 09:03

## 2019-01-01 RX ADMIN — SULFAMETHOXAZOLE AND TRIMETHOPRIM 365 MG: 80; 16 INJECTION, SOLUTION, CONCENTRATE INTRAVENOUS at 08:24

## 2019-01-01 RX ADMIN — HYDROCODONE BITARTRATE AND ACETAMINOPHEN 1 TABLET: 5; 325 TABLET ORAL at 22:24

## 2019-01-01 RX ADMIN — MOMETASONE FUROATE AND FORMOTEROL FUMARATE DIHYDRATE 2 PUFF: 100; 5 AEROSOL RESPIRATORY (INHALATION) at 22:35

## 2019-01-01 RX ADMIN — Medication 10 ML: at 18:22

## 2019-01-01 RX ADMIN — AMIODARONE HYDROCHLORIDE 200 MG: 200 TABLET ORAL at 09:40

## 2019-01-01 RX ADMIN — NADOLOL 20 MG: 20 TABLET ORAL at 09:01

## 2019-01-01 RX ADMIN — CLOPIDOGREL 75 MG: 75 TABLET, FILM COATED ORAL at 09:52

## 2019-01-01 RX ADMIN — DEXMEDETOMIDINE HYDROCHLORIDE 0.2 MCG/KG/HR: 100 INJECTION, SOLUTION INTRAVENOUS at 01:08

## 2019-01-01 RX ADMIN — PANTOPRAZOLE SODIUM 40 MG: 40 TABLET, DELAYED RELEASE ORAL at 08:33

## 2019-01-01 RX ADMIN — INSULIN LISPRO 2 UNITS: 100 INJECTION, SOLUTION INTRAVENOUS; SUBCUTANEOUS at 16:58

## 2019-01-01 RX ADMIN — Medication 10 ML: at 10:37

## 2019-01-01 RX ADMIN — PROPOFOL 10 MCG/KG/MIN: 10 INJECTION, EMULSION INTRAVENOUS at 21:21

## 2019-01-01 RX ADMIN — Medication 10 ML: at 10:14

## 2019-01-01 RX ADMIN — FORMOTEROL FUMARATE DIHYDRATE 20 MCG: 20 SOLUTION RESPIRATORY (INHALATION) at 21:15

## 2019-01-01 RX ADMIN — FORMOTEROL FUMARATE DIHYDRATE 20 MCG: 20 SOLUTION RESPIRATORY (INHALATION) at 06:18

## 2019-01-01 RX ADMIN — FORMOTEROL FUMARATE DIHYDRATE 20 MCG: 20 SOLUTION RESPIRATORY (INHALATION) at 09:03

## 2019-01-01 RX ADMIN — FORMOTEROL FUMARATE DIHYDRATE 20 MCG: 20 SOLUTION RESPIRATORY (INHALATION) at 19:59

## 2019-01-01 RX ADMIN — BUDESONIDE 250 MCG: 0.25 SUSPENSION RESPIRATORY (INHALATION) at 08:35

## 2019-01-01 RX ADMIN — CLOPIDOGREL 75 MG: 75 TABLET, FILM COATED ORAL at 08:10

## 2019-01-01 RX ADMIN — FORMOTEROL FUMARATE DIHYDRATE 20 MCG: 20 SOLUTION RESPIRATORY (INHALATION) at 08:55

## 2019-01-01 RX ADMIN — INSULIN LISPRO 3 UNITS: 100 INJECTION, SOLUTION INTRAVENOUS; SUBCUTANEOUS at 02:16

## 2019-01-01 RX ADMIN — CITALOPRAM 20 MG: 20 TABLET, FILM COATED ORAL at 21:02

## 2019-01-01 RX ADMIN — INSULIN LISPRO 2 UNITS: 100 INJECTION, SOLUTION INTRAVENOUS; SUBCUTANEOUS at 17:00

## 2019-01-01 RX ADMIN — SODIUM CHLORIDE, POTASSIUM CHLORIDE, SODIUM LACTATE AND CALCIUM CHLORIDE: 600; 310; 30; 20 INJECTION, SOLUTION INTRAVENOUS at 07:00

## 2019-01-01 RX ADMIN — BUDESONIDE 250 MCG: 0.25 SUSPENSION RESPIRATORY (INHALATION) at 19:59

## 2019-01-01 RX ADMIN — INSULIN LISPRO 1 UNITS: 100 INJECTION, SOLUTION INTRAVENOUS; SUBCUTANEOUS at 02:48

## 2019-01-01 RX ADMIN — MOMETASONE FUROATE AND FORMOTEROL FUMARATE DIHYDRATE 2 PUFF: 100; 5 AEROSOL RESPIRATORY (INHALATION) at 08:47

## 2019-01-01 RX ADMIN — IPRATROPIUM BROMIDE AND ALBUTEROL SULFATE 1 AMPULE: .5; 3 SOLUTION RESPIRATORY (INHALATION) at 12:39

## 2019-01-01 RX ADMIN — PANTOPRAZOLE SODIUM 40 MG: 40 INJECTION, POWDER, FOR SOLUTION INTRAVENOUS at 08:47

## 2019-01-01 RX ADMIN — CLONAZEPAM 0.5 MG: 0.5 TABLET ORAL at 08:09

## 2019-01-01 RX ADMIN — Medication 10 ML: at 22:59

## 2019-01-01 RX ADMIN — HEPARIN SODIUM 5000 UNITS: 10000 INJECTION INTRAVENOUS; SUBCUTANEOUS at 14:51

## 2019-01-01 RX ADMIN — Medication 10 ML: at 22:12

## 2019-01-01 RX ADMIN — Medication 2 MG: at 19:39

## 2019-01-01 RX ADMIN — ATORVASTATIN CALCIUM 80 MG: 40 TABLET, FILM COATED ORAL at 20:12

## 2019-01-01 RX ADMIN — AMOXICILLIN AND CLAVULANATE POTASSIUM 1 TABLET: 875; 125 TABLET, FILM COATED ORAL at 08:49

## 2019-01-01 RX ADMIN — BISACODYL 10 MG: 10 SUPPOSITORY RECTAL at 08:33

## 2019-01-01 RX ADMIN — Medication 10 ML: at 10:26

## 2019-01-01 RX ADMIN — METHYLPREDNISOLONE SODIUM SUCCINATE 40 MG: 40 INJECTION, POWDER, FOR SOLUTION INTRAMUSCULAR; INTRAVENOUS at 00:15

## 2019-01-01 RX ADMIN — HYDRALAZINE HYDROCHLORIDE 50 MG: 50 TABLET, FILM COATED ORAL at 06:00

## 2019-01-01 RX ADMIN — FORMOTEROL FUMARATE DIHYDRATE 20 MCG: 20 SOLUTION RESPIRATORY (INHALATION) at 09:06

## 2019-01-01 RX ADMIN — Medication 10 ML: at 20:48

## 2019-01-01 RX ADMIN — CALCIUM CARBONATE-VITAMIN D TAB 500 MG-200 UNIT 1 TABLET: 500-200 TAB at 20:26

## 2019-01-01 RX ADMIN — SULFAMETHOXAZOLE AND TRIMETHOPRIM 2 TABLET: 800; 160 TABLET ORAL at 09:03

## 2019-01-01 RX ADMIN — HYDRALAZINE HYDROCHLORIDE 50 MG: 50 TABLET, FILM COATED ORAL at 22:23

## 2019-01-01 RX ADMIN — SULFAMETHOXAZOLE AND TRIMETHOPRIM 365 MG: 80; 16 INJECTION, SOLUTION, CONCENTRATE INTRAVENOUS at 09:00

## 2019-01-01 RX ADMIN — HYDRALAZINE HYDROCHLORIDE 10 MG: 20 INJECTION INTRAMUSCULAR; INTRAVENOUS at 12:17

## 2019-01-01 RX ADMIN — FUROSEMIDE 20 MG: 20 TABLET ORAL at 08:33

## 2019-01-01 RX ADMIN — HYDRALAZINE HYDROCHLORIDE 50 MG: 25 TABLET, FILM COATED ORAL at 05:36

## 2019-01-01 RX ADMIN — SULFAMETHOXAZOLE AND TRIMETHOPRIM 2 TABLET: 800; 160 TABLET ORAL at 08:49

## 2019-01-01 RX ADMIN — APIXABAN 5 MG: 5 TABLET, FILM COATED ORAL at 05:43

## 2019-01-01 RX ADMIN — Medication 10 ML: at 08:50

## 2019-01-01 RX ADMIN — SULFAMETHOXAZOLE AND TRIMETHOPRIM 365 MG: 80; 16 INJECTION, SOLUTION, CONCENTRATE INTRAVENOUS at 22:09

## 2019-01-01 RX ADMIN — ACETAMINOPHEN 650 MG: 325 TABLET, FILM COATED ORAL at 10:32

## 2019-01-01 RX ADMIN — APIXABAN 5 MG: 5 TABLET, FILM COATED ORAL at 06:21

## 2019-01-01 RX ADMIN — HYDRALAZINE HYDROCHLORIDE 50 MG: 25 TABLET, FILM COATED ORAL at 13:28

## 2019-01-01 RX ADMIN — Medication 10 ML: at 08:12

## 2019-01-01 RX ADMIN — METHYLPREDNISOLONE SODIUM SUCCINATE 40 MG: 40 INJECTION, POWDER, FOR SOLUTION INTRAMUSCULAR; INTRAVENOUS at 18:22

## 2019-01-01 RX ADMIN — IPRATROPIUM BROMIDE AND ALBUTEROL SULFATE 1 AMPULE: .5; 3 SOLUTION RESPIRATORY (INHALATION) at 08:40

## 2019-01-01 RX ADMIN — INSULIN LISPRO 9 UNITS: 100 INJECTION, SOLUTION INTRAVENOUS; SUBCUTANEOUS at 19:49

## 2019-01-01 RX ADMIN — INSULIN LISPRO 2 UNITS: 100 INJECTION, SOLUTION INTRAVENOUS; SUBCUTANEOUS at 13:20

## 2019-01-01 RX ADMIN — INSULIN LISPRO 1 UNITS: 100 INJECTION, SOLUTION INTRAVENOUS; SUBCUTANEOUS at 21:20

## 2019-01-01 RX ADMIN — SULFAMETHOXAZOLE AND TRIMETHOPRIM 365 MG: 80; 16 INJECTION, SOLUTION, CONCENTRATE INTRAVENOUS at 21:58

## 2019-01-01 RX ADMIN — HYDRALAZINE HYDROCHLORIDE 50 MG: 25 TABLET, FILM COATED ORAL at 15:27

## 2019-01-01 RX ADMIN — PROPOFOL 35 MCG/KG/MIN: 10 INJECTION, EMULSION INTRAVENOUS at 04:14

## 2019-01-01 RX ADMIN — SULFAMETHOXAZOLE AND TRIMETHOPRIM 1 TABLET: 800; 160 TABLET ORAL at 22:24

## 2019-01-01 RX ADMIN — DIPHENHYDRAMINE HCL 25 MG: 25 TABLET ORAL at 22:02

## 2019-01-01 RX ADMIN — BUDESONIDE 250 MCG: 0.25 SUSPENSION RESPIRATORY (INHALATION) at 08:05

## 2019-01-01 RX ADMIN — FENTANYL CITRATE 25 MCG: 50 INJECTION, SOLUTION INTRAMUSCULAR; INTRAVENOUS at 12:17

## 2019-01-01 RX ADMIN — AMIODARONE HYDROCHLORIDE 200 MG: 200 TABLET ORAL at 09:01

## 2019-01-01 RX ADMIN — CLOPIDOGREL 75 MG: 75 TABLET, FILM COATED ORAL at 08:09

## 2019-01-01 RX ADMIN — PANTOPRAZOLE SODIUM 40 MG: 40 INJECTION, POWDER, FOR SOLUTION INTRAVENOUS at 08:33

## 2019-01-01 RX ADMIN — HYDROCODONE BITARTRATE AND ACETAMINOPHEN 1 TABLET: 5; 325 TABLET ORAL at 14:20

## 2019-01-01 RX ADMIN — CITALOPRAM 20 MG: 20 TABLET, FILM COATED ORAL at 22:24

## 2019-01-01 RX ADMIN — AMIODARONE HYDROCHLORIDE 200 MG: 200 TABLET ORAL at 09:53

## 2019-01-01 RX ADMIN — POLYETHYLENE GLYCOL 3350 17 G: 17 POWDER, FOR SOLUTION ORAL at 08:33

## 2019-01-01 RX ADMIN — SODIUM POLYSTYRENE SULFONATE 15 G: 1 POWDER ORAL; RECTAL at 18:34

## 2019-01-01 RX ADMIN — PROPOFOL 10 MCG/KG/MIN: 10 INJECTION, EMULSION INTRAVENOUS at 10:14

## 2019-01-01 RX ADMIN — Medication 10 ML: at 20:59

## 2019-01-01 RX ADMIN — Medication 10 ML: at 09:38

## 2019-01-01 RX ADMIN — IPRATROPIUM BROMIDE AND ALBUTEROL SULFATE 1 AMPULE: .5; 3 SOLUTION RESPIRATORY (INHALATION) at 19:55

## 2019-01-01 RX ADMIN — HYDRALAZINE HYDROCHLORIDE 50 MG: 50 TABLET, FILM COATED ORAL at 23:51

## 2019-01-01 RX ADMIN — Medication 10 ML: at 09:05

## 2019-01-01 RX ADMIN — IPRATROPIUM BROMIDE AND ALBUTEROL SULFATE 1 AMPULE: .5; 3 SOLUTION RESPIRATORY (INHALATION) at 23:46

## 2019-01-01 RX ADMIN — HEPARIN SODIUM 5000 UNITS: 10000 INJECTION INTRAVENOUS; SUBCUTANEOUS at 21:57

## 2019-01-01 RX ADMIN — IPRATROPIUM BROMIDE AND ALBUTEROL SULFATE 1 AMPULE: .5; 3 SOLUTION RESPIRATORY (INHALATION) at 09:22

## 2019-01-01 RX ADMIN — PANTOPRAZOLE SODIUM 40 MG: 40 INJECTION, POWDER, FOR SOLUTION INTRAVENOUS at 13:12

## 2019-01-01 RX ADMIN — METHYLPREDNISOLONE SODIUM SUCCINATE 40 MG: 40 INJECTION, POWDER, FOR SOLUTION INTRAMUSCULAR; INTRAVENOUS at 06:22

## 2019-01-01 RX ADMIN — FORMOTEROL FUMARATE DIHYDRATE 20 MCG: 20 SOLUTION RESPIRATORY (INHALATION) at 20:18

## 2019-01-01 RX ADMIN — HYDRALAZINE HYDROCHLORIDE 50 MG: 50 TABLET, FILM COATED ORAL at 05:35

## 2019-01-01 RX ADMIN — SODIUM CHLORIDE: 9 INJECTION, SOLUTION INTRAVENOUS at 05:50

## 2019-01-01 RX ADMIN — AMIODARONE HYDROCHLORIDE 200 MG: 200 TABLET ORAL at 13:14

## 2019-01-01 RX ADMIN — GLYCOPYRROLATE AND FORMOTEROL FUMARATE 2 PUFF: 9; 4.8 AEROSOL, METERED RESPIRATORY (INHALATION) at 09:35

## 2019-01-01 RX ADMIN — LEVOTHYROXINE SODIUM 137 MCG: 137 TABLET ORAL at 06:21

## 2019-01-01 RX ADMIN — IPRATROPIUM BROMIDE AND ALBUTEROL SULFATE 1 AMPULE: .5; 3 SOLUTION RESPIRATORY (INHALATION) at 20:06

## 2019-01-01 RX ADMIN — IPRATROPIUM BROMIDE AND ALBUTEROL SULFATE 1 AMPULE: .5; 3 SOLUTION RESPIRATORY (INHALATION) at 08:05

## 2019-01-01 RX ADMIN — INSULIN LISPRO 1 UNITS: 100 INJECTION, SOLUTION INTRAVENOUS; SUBCUTANEOUS at 16:45

## 2019-01-01 RX ADMIN — SULFAMETHOXAZOLE AND TRIMETHOPRIM 365 MG: 80; 16 INJECTION, SOLUTION, CONCENTRATE INTRAVENOUS at 21:10

## 2019-01-01 RX ADMIN — METHYLPREDNISOLONE SODIUM SUCCINATE 40 MG: 40 INJECTION, POWDER, FOR SOLUTION INTRAMUSCULAR; INTRAVENOUS at 06:02

## 2019-01-01 RX ADMIN — Medication 50 MCG/HR: at 20:05

## 2019-01-01 RX ADMIN — AMIODARONE HYDROCHLORIDE 200 MG: 200 TABLET ORAL at 09:49

## 2019-01-01 RX ADMIN — CITALOPRAM 20 MG: 20 TABLET, FILM COATED ORAL at 20:50

## 2019-01-01 RX ADMIN — INSULIN LISPRO 1 UNITS: 100 INJECTION, SOLUTION INTRAVENOUS; SUBCUTANEOUS at 06:15

## 2019-01-01 RX ADMIN — ACETAMINOPHEN 650 MG: 325 TABLET, FILM COATED ORAL at 01:11

## 2019-01-01 RX ADMIN — AMIODARONE HYDROCHLORIDE 200 MG: 200 TABLET ORAL at 08:39

## 2019-01-01 RX ADMIN — METHYLPREDNISOLONE SODIUM SUCCINATE 40 MG: 40 INJECTION, POWDER, FOR SOLUTION INTRAMUSCULAR; INTRAVENOUS at 08:23

## 2019-01-01 RX ADMIN — Medication 9.2 MILLICURIE: at 13:37

## 2019-01-01 RX ADMIN — PROPOFOL 25 MCG/KG/MIN: 10 INJECTION, EMULSION INTRAVENOUS at 13:24

## 2019-01-01 RX ADMIN — BUDESONIDE 250 MCG: 0.25 SUSPENSION RESPIRATORY (INHALATION) at 19:52

## 2019-01-01 RX ADMIN — NADOLOL 20 MG: 20 TABLET ORAL at 20:51

## 2019-01-01 RX ADMIN — CLOPIDOGREL 75 MG: 75 TABLET, FILM COATED ORAL at 09:41

## 2019-01-01 RX ADMIN — FORMOTEROL FUMARATE DIHYDRATE 20 MCG: 20 SOLUTION RESPIRATORY (INHALATION) at 09:31

## 2019-01-01 RX ADMIN — BUDESONIDE 250 MCG: 0.25 SUSPENSION RESPIRATORY (INHALATION) at 08:21

## 2019-01-01 RX ADMIN — Medication 10 ML: at 09:01

## 2019-01-01 RX ADMIN — ATORVASTATIN CALCIUM 80 MG: 40 TABLET, FILM COATED ORAL at 21:02

## 2019-01-01 RX ADMIN — CALCIUM CARBONATE-VITAMIN D TAB 500 MG-200 UNIT 1 TABLET: 500-200 TAB at 09:52

## 2019-01-01 RX ADMIN — DILTIAZEM HYDROCHLORIDE 180 MG: 180 CAPSULE, EXTENDED RELEASE ORAL at 09:52

## 2019-01-01 RX ADMIN — HYDRALAZINE HYDROCHLORIDE 50 MG: 50 TABLET, FILM COATED ORAL at 13:47

## 2019-01-01 RX ADMIN — IPRATROPIUM BROMIDE AND ALBUTEROL SULFATE 1 AMPULE: .5; 3 SOLUTION RESPIRATORY (INHALATION) at 20:05

## 2019-01-01 RX ADMIN — METHYLPREDNISOLONE SODIUM SUCCINATE 40 MG: 40 INJECTION, POWDER, FOR SOLUTION INTRAMUSCULAR; INTRAVENOUS at 20:49

## 2019-01-01 RX ADMIN — HYDRALAZINE HYDROCHLORIDE 50 MG: 50 TABLET, FILM COATED ORAL at 22:58

## 2019-01-01 RX ADMIN — Medication 10 ML: at 08:33

## 2019-01-01 RX ADMIN — HYDRALAZINE HYDROCHLORIDE 50 MG: 25 TABLET, FILM COATED ORAL at 20:50

## 2019-01-01 RX ADMIN — LEVOTHYROXINE SODIUM 137 MCG: 137 TABLET ORAL at 06:28

## 2019-01-01 RX ADMIN — CALCIUM CARBONATE-VITAMIN D TAB 500 MG-200 UNIT 1 TABLET: 500-200 TAB at 22:31

## 2019-01-01 RX ADMIN — METHYLPREDNISOLONE SODIUM SUCCINATE 40 MG: 40 INJECTION, POWDER, FOR SOLUTION INTRAMUSCULAR; INTRAVENOUS at 08:37

## 2019-01-01 RX ADMIN — HEPARIN SODIUM 5000 UNITS: 10000 INJECTION INTRAVENOUS; SUBCUTANEOUS at 21:35

## 2019-01-01 RX ADMIN — IPRATROPIUM BROMIDE AND ALBUTEROL SULFATE 1 AMPULE: .5; 3 SOLUTION RESPIRATORY (INHALATION) at 13:26

## 2019-01-01 RX ADMIN — HYDRALAZINE HYDROCHLORIDE 10 MG: 20 INJECTION INTRAMUSCULAR; INTRAVENOUS at 16:57

## 2019-01-01 RX ADMIN — ATORVASTATIN CALCIUM 80 MG: 40 TABLET, FILM COATED ORAL at 21:24

## 2019-01-01 RX ADMIN — Medication 10 ML: at 21:05

## 2019-01-01 RX ADMIN — FORMOTEROL FUMARATE DIHYDRATE 20 MCG: 20 SOLUTION RESPIRATORY (INHALATION) at 19:52

## 2019-01-01 RX ADMIN — Medication 10 ML: at 20:44

## 2019-01-01 RX ADMIN — IPRATROPIUM BROMIDE AND ALBUTEROL SULFATE 1 AMPULE: .5; 3 SOLUTION RESPIRATORY (INHALATION) at 08:55

## 2019-01-01 RX ADMIN — CLOPIDOGREL 75 MG: 75 TABLET, FILM COATED ORAL at 08:33

## 2019-01-01 RX ADMIN — Medication 10 ML: at 08:23

## 2019-01-01 RX ADMIN — ACETAMINOPHEN 650 MG: 325 TABLET, FILM COATED ORAL at 09:58

## 2019-01-01 RX ADMIN — SODIUM CHLORIDE, POTASSIUM CHLORIDE, SODIUM LACTATE AND CALCIUM CHLORIDE: 600; 310; 30; 20 INJECTION, SOLUTION INTRAVENOUS at 16:29

## 2019-01-01 RX ADMIN — ONDANSETRON 4 MG: 2 INJECTION INTRAMUSCULAR; INTRAVENOUS at 23:56

## 2019-01-01 RX ADMIN — IPRATROPIUM BROMIDE AND ALBUTEROL SULFATE 1 AMPULE: .5; 3 SOLUTION RESPIRATORY (INHALATION) at 12:59

## 2019-01-01 RX ADMIN — AMOXICILLIN AND CLAVULANATE POTASSIUM 1 TABLET: 875; 125 TABLET, FILM COATED ORAL at 21:20

## 2019-01-01 RX ADMIN — IPRATROPIUM BROMIDE AND ALBUTEROL SULFATE 1 AMPULE: .5; 3 SOLUTION RESPIRATORY (INHALATION) at 21:08

## 2019-01-01 RX ADMIN — INSULIN LISPRO 3 UNITS: 100 INJECTION, SOLUTION INTRAVENOUS; SUBCUTANEOUS at 12:10

## 2019-01-01 RX ADMIN — NADOLOL 20 MG: 20 TABLET ORAL at 08:45

## 2019-01-01 RX ADMIN — IPRATROPIUM BROMIDE AND ALBUTEROL SULFATE 1 AMPULE: .5; 3 SOLUTION RESPIRATORY (INHALATION) at 21:15

## 2019-01-01 RX ADMIN — INSULIN LISPRO 3 UNITS: 100 INJECTION, SOLUTION INTRAVENOUS; SUBCUTANEOUS at 16:23

## 2019-01-01 RX ADMIN — ROFLUMILAST 500 MCG: 500 TABLET ORAL at 09:01

## 2019-01-01 RX ADMIN — IPRATROPIUM BROMIDE AND ALBUTEROL SULFATE 1 AMPULE: .5; 3 SOLUTION RESPIRATORY (INHALATION) at 20:28

## 2019-01-01 RX ADMIN — DEXTROSE 50 % IN WATER (D50W) INTRAVENOUS SYRINGE 12.5 G: at 16:05

## 2019-01-01 RX ADMIN — ATORVASTATIN CALCIUM 80 MG: 40 TABLET, FILM COATED ORAL at 20:34

## 2019-01-01 RX ADMIN — IPRATROPIUM BROMIDE AND ALBUTEROL SULFATE 1 AMPULE: .5; 3 SOLUTION RESPIRATORY (INHALATION) at 18:09

## 2019-01-01 RX ADMIN — PANTOPRAZOLE SODIUM 40 MG: 40 INJECTION, POWDER, FOR SOLUTION INTRAVENOUS at 21:18

## 2019-01-01 RX ADMIN — Medication 10 ML: at 20:23

## 2019-01-01 RX ADMIN — Medication 10 ML: at 20:06

## 2019-01-01 RX ADMIN — MAGNESIUM SULFATE 1 G: 1 INJECTION INTRAVENOUS at 14:02

## 2019-01-01 RX ADMIN — FORMOTEROL FUMARATE DIHYDRATE 20 MCG: 20 SOLUTION RESPIRATORY (INHALATION) at 08:40

## 2019-01-01 RX ADMIN — ATORVASTATIN CALCIUM 80 MG: 40 TABLET, FILM COATED ORAL at 20:49

## 2019-01-01 RX ADMIN — METOPROLOL TARTRATE 25 MG: 25 TABLET ORAL at 08:33

## 2019-01-01 RX ADMIN — PROPOFOL 25 MCG/KG/MIN: 10 INJECTION, EMULSION INTRAVENOUS at 21:52

## 2019-01-01 RX ADMIN — TIOTROPIUM BROMIDE 18 MCG: 18 CAPSULE ORAL; RESPIRATORY (INHALATION) at 08:48

## 2019-01-01 RX ADMIN — PROPOFOL 15 MCG/KG/MIN: 10 INJECTION, EMULSION INTRAVENOUS at 07:43

## 2019-01-01 RX ADMIN — Medication 10 ML: at 21:59

## 2019-01-01 RX ADMIN — FORMOTEROL FUMARATE DIHYDRATE 20 MCG: 20 SOLUTION RESPIRATORY (INHALATION) at 08:45

## 2019-01-01 RX ADMIN — Medication 10 ML: at 09:42

## 2019-01-01 RX ADMIN — ACETAMINOPHEN 650 MG: 325 TABLET, FILM COATED ORAL at 20:55

## 2019-01-01 RX ADMIN — LINAGLIPTIN 5 MG: 5 TABLET, FILM COATED ORAL at 08:45

## 2019-01-01 RX ADMIN — PANTOPRAZOLE SODIUM 40 MG: 40 TABLET, DELAYED RELEASE ORAL at 20:26

## 2019-01-01 RX ADMIN — POTASSIUM CHLORIDE 20 MEQ: 29.8 INJECTION, SOLUTION INTRAVENOUS at 10:20

## 2019-01-01 RX ADMIN — Medication 10 ML: at 17:50

## 2019-01-01 RX ADMIN — IPRATROPIUM BROMIDE AND ALBUTEROL SULFATE 1 AMPULE: .5; 3 SOLUTION RESPIRATORY (INHALATION) at 19:48

## 2019-01-01 RX ADMIN — INSULIN LISPRO 5 UNITS: 100 INJECTION, SOLUTION INTRAVENOUS; SUBCUTANEOUS at 09:41

## 2019-01-01 RX ADMIN — IOPAMIDOL 110 ML: 755 INJECTION, SOLUTION INTRAVENOUS at 12:21

## 2019-01-01 RX ADMIN — HEPARIN SODIUM 5000 UNITS: 10000 INJECTION INTRAVENOUS; SUBCUTANEOUS at 14:19

## 2019-01-01 RX ADMIN — Medication 10 ML: at 13:24

## 2019-01-01 RX ADMIN — ATORVASTATIN CALCIUM 80 MG: 40 TABLET, FILM COATED ORAL at 21:30

## 2019-01-01 RX ADMIN — BUDESONIDE 250 MCG: 0.25 SUSPENSION RESPIRATORY (INHALATION) at 08:22

## 2019-01-01 RX ADMIN — Medication 10 ML: at 13:09

## 2019-01-01 RX ADMIN — PANTOPRAZOLE SODIUM 40 MG: 40 INJECTION, POWDER, FOR SOLUTION INTRAVENOUS at 09:05

## 2019-01-01 RX ADMIN — PROPOFOL 35 MCG/KG/MIN: 10 INJECTION, EMULSION INTRAVENOUS at 23:38

## 2019-01-01 RX ADMIN — METHYLPREDNISOLONE SODIUM SUCCINATE 40 MG: 40 INJECTION, POWDER, FOR SOLUTION INTRAMUSCULAR; INTRAVENOUS at 14:08

## 2019-01-01 RX ADMIN — IPRATROPIUM BROMIDE AND ALBUTEROL SULFATE 1 AMPULE: .5; 3 SOLUTION RESPIRATORY (INHALATION) at 12:46

## 2019-01-01 RX ADMIN — SODIUM CHLORIDE 1000 ML: 9 INJECTION, SOLUTION INTRAVENOUS at 06:52

## 2019-01-01 RX ADMIN — METHYLPREDNISOLONE SODIUM SUCCINATE 40 MG: 40 INJECTION, POWDER, FOR SOLUTION INTRAMUSCULAR; INTRAVENOUS at 17:44

## 2019-01-01 RX ADMIN — Medication 10 ML: at 09:06

## 2019-01-01 RX ADMIN — IPRATROPIUM BROMIDE AND ALBUTEROL SULFATE 1 AMPULE: .5; 3 SOLUTION RESPIRATORY (INHALATION) at 07:53

## 2019-01-01 RX ADMIN — LINAGLIPTIN 5 MG: 5 TABLET, FILM COATED ORAL at 08:09

## 2019-01-01 RX ADMIN — APIXABAN 5 MG: 5 TABLET, FILM COATED ORAL at 08:33

## 2019-01-01 RX ADMIN — FORMOTEROL FUMARATE DIHYDRATE 20 MCG: 20 SOLUTION RESPIRATORY (INHALATION) at 20:48

## 2019-01-01 RX ADMIN — NADOLOL 20 MG: 20 TABLET ORAL at 08:09

## 2019-01-01 RX ADMIN — FLUTICASONE PROPIONATE 1 PUFF: 110 AEROSOL, METERED RESPIRATORY (INHALATION) at 21:02

## 2019-01-01 RX ADMIN — Medication 10 ML: at 08:09

## 2019-01-01 RX ADMIN — ENOXAPARIN SODIUM 30 MG: 30 INJECTION SUBCUTANEOUS at 10:55

## 2019-01-01 RX ADMIN — IPRATROPIUM BROMIDE AND ALBUTEROL SULFATE 1 AMPULE: .5; 3 SOLUTION RESPIRATORY (INHALATION) at 04:00

## 2019-01-01 RX ADMIN — PANTOPRAZOLE SODIUM 40 MG: 40 INJECTION, POWDER, FOR SOLUTION INTRAVENOUS at 09:42

## 2019-01-01 RX ADMIN — BUDESONIDE 250 MCG: 0.25 SUSPENSION RESPIRATORY (INHALATION) at 20:00

## 2019-01-01 RX ADMIN — ATORVASTATIN CALCIUM 80 MG: 40 TABLET, FILM COATED ORAL at 21:04

## 2019-01-01 RX ADMIN — HYDROCODONE BITARTRATE AND ACETAMINOPHEN 1 TABLET: 5; 325 TABLET ORAL at 15:44

## 2019-01-01 RX ADMIN — DEXMEDETOMIDINE HYDROCHLORIDE 0.6 MCG/KG/HR: 100 INJECTION, SOLUTION INTRAVENOUS at 14:30

## 2019-01-01 RX ADMIN — METHYLPREDNISOLONE SODIUM SUCCINATE 40 MG: 40 INJECTION, POWDER, FOR SOLUTION INTRAMUSCULAR; INTRAVENOUS at 06:34

## 2019-01-01 RX ADMIN — BUDESONIDE 250 MCG: 0.25 SUSPENSION RESPIRATORY (INHALATION) at 09:03

## 2019-01-01 RX ADMIN — APIXABAN 5 MG: 5 TABLET, FILM COATED ORAL at 16:55

## 2019-01-01 RX ADMIN — INSULIN LISPRO 1 UNITS: 100 INJECTION, SOLUTION INTRAVENOUS; SUBCUTANEOUS at 06:21

## 2019-01-01 RX ADMIN — HYDRALAZINE HYDROCHLORIDE 50 MG: 50 TABLET, FILM COATED ORAL at 22:03

## 2019-01-01 RX ADMIN — METHYLPREDNISOLONE SODIUM SUCCINATE 40 MG: 40 INJECTION, POWDER, FOR SOLUTION INTRAMUSCULAR; INTRAVENOUS at 21:24

## 2019-01-01 RX ADMIN — PROPOFOL 25 MCG/KG/MIN: 10 INJECTION, EMULSION INTRAVENOUS at 10:46

## 2019-01-01 RX ADMIN — APIXABAN 5 MG: 5 TABLET, FILM COATED ORAL at 16:33

## 2019-01-01 RX ADMIN — Medication 10 ML: at 09:17

## 2019-01-01 RX ADMIN — PIPERACILLIN SODIUM AND TAZOBACTAM SODIUM 3.38 G: 3; .375 INJECTION, POWDER, LYOPHILIZED, FOR SOLUTION INTRAVENOUS at 13:42

## 2019-01-01 RX ADMIN — MOMETASONE FUROATE AND FORMOTEROL FUMARATE DIHYDRATE 2 PUFF: 100; 5 AEROSOL RESPIRATORY (INHALATION) at 08:08

## 2019-01-01 RX ADMIN — IPRATROPIUM BROMIDE AND ALBUTEROL SULFATE 1 AMPULE: .5; 3 SOLUTION RESPIRATORY (INHALATION) at 16:39

## 2019-01-01 RX ADMIN — DOCUSATE SODIUM 100 MG: 100 CAPSULE, LIQUID FILLED ORAL at 08:33

## 2019-01-01 RX ADMIN — Medication 10 ML: at 17:44

## 2019-01-01 RX ADMIN — APIXABAN 5 MG: 5 TABLET, FILM COATED ORAL at 18:09

## 2019-01-01 RX ADMIN — FORMOTEROL FUMARATE DIHYDRATE 20 MCG: 20 SOLUTION RESPIRATORY (INHALATION) at 08:05

## 2019-01-01 RX ADMIN — Medication 10 ML: at 09:04

## 2019-01-01 RX ADMIN — IPRATROPIUM BROMIDE AND ALBUTEROL SULFATE 1 AMPULE: .5; 3 SOLUTION RESPIRATORY (INHALATION) at 01:20

## 2019-01-01 RX ADMIN — FENTANYL CITRATE 25 MCG: 50 INJECTION, SOLUTION INTRAMUSCULAR; INTRAVENOUS at 12:36

## 2019-01-01 RX ADMIN — TIOTROPIUM BROMIDE 18 MCG: 18 CAPSULE ORAL; RESPIRATORY (INHALATION) at 08:08

## 2019-01-01 RX ADMIN — HEPARIN SODIUM 5000 UNITS: 10000 INJECTION INTRAVENOUS; SUBCUTANEOUS at 05:38

## 2019-01-01 RX ADMIN — TIOTROPIUM BROMIDE 18 MCG: 18 CAPSULE ORAL; RESPIRATORY (INHALATION) at 09:42

## 2019-01-01 RX ADMIN — SODIUM CHLORIDE, POTASSIUM CHLORIDE, SODIUM LACTATE AND CALCIUM CHLORIDE: 600; 310; 30; 20 INJECTION, SOLUTION INTRAVENOUS at 05:55

## 2019-01-01 RX ADMIN — ETOMIDATE 20 MG: 2 INJECTION INTRAVENOUS at 06:18

## 2019-01-01 RX ADMIN — BUDESONIDE 250 MCG: 0.25 SUSPENSION RESPIRATORY (INHALATION) at 20:16

## 2019-01-01 RX ADMIN — IPRATROPIUM BROMIDE AND ALBUTEROL SULFATE 1 AMPULE: .5; 3 SOLUTION RESPIRATORY (INHALATION) at 23:47

## 2019-01-01 RX ADMIN — BUDESONIDE 250 MCG: 0.25 SUSPENSION RESPIRATORY (INHALATION) at 10:06

## 2019-01-01 RX ADMIN — DILTIAZEM HYDROCHLORIDE 180 MG: 180 CAPSULE, EXTENDED RELEASE ORAL at 08:33

## 2019-01-01 RX ADMIN — IPRATROPIUM BROMIDE AND ALBUTEROL SULFATE 1 AMPULE: .5; 3 SOLUTION RESPIRATORY (INHALATION) at 16:11

## 2019-01-01 RX ADMIN — BUDESONIDE 250 MCG: 0.25 SUSPENSION RESPIRATORY (INHALATION) at 19:54

## 2019-01-01 RX ADMIN — SODIUM CHLORIDE, POTASSIUM CHLORIDE, SODIUM LACTATE AND CALCIUM CHLORIDE: 600; 310; 30; 20 INJECTION, SOLUTION INTRAVENOUS at 20:35

## 2019-01-01 RX ADMIN — INSULIN LISPRO 4 UNITS: 100 INJECTION, SOLUTION INTRAVENOUS; SUBCUTANEOUS at 15:31

## 2019-01-01 RX ADMIN — MOMETASONE FUROATE AND FORMOTEROL FUMARATE DIHYDRATE 2 PUFF: 100; 5 AEROSOL RESPIRATORY (INHALATION) at 22:16

## 2019-01-01 RX ADMIN — PIPERACILLIN SODIUM AND TAZOBACTAM SODIUM 3.38 G: 3; .375 INJECTION, POWDER, LYOPHILIZED, FOR SOLUTION INTRAVENOUS at 21:34

## 2019-01-01 RX ADMIN — PANTOPRAZOLE SODIUM 40 MG: 40 TABLET, DELAYED RELEASE ORAL at 09:53

## 2019-01-01 RX ADMIN — DILTIAZEM HYDROCHLORIDE 180 MG: 180 CAPSULE, COATED, EXTENDED RELEASE ORAL at 09:04

## 2019-01-01 RX ADMIN — CLOPIDOGREL 75 MG: 75 TABLET, FILM COATED ORAL at 13:11

## 2019-01-01 RX ADMIN — BUDESONIDE 250 MCG: 0.25 SUSPENSION RESPIRATORY (INHALATION) at 08:45

## 2019-01-01 RX ADMIN — HYDRALAZINE HYDROCHLORIDE 25 MG: 25 TABLET, FILM COATED ORAL at 06:21

## 2019-01-01 RX ADMIN — METHYLPREDNISOLONE SODIUM SUCCINATE 40 MG: 40 INJECTION, POWDER, FOR SOLUTION INTRAMUSCULAR; INTRAVENOUS at 17:05

## 2019-01-01 RX ADMIN — GLYCOPYRROLATE 0.2 MG: 0.2 INJECTION, SOLUTION INTRAMUSCULAR; INTRAVENOUS at 10:56

## 2019-01-01 RX ADMIN — BUDESONIDE 250 MCG: 0.25 SUSPENSION RESPIRATORY (INHALATION) at 20:06

## 2019-01-01 RX ADMIN — METHYLPREDNISOLONE SODIUM SUCCINATE 40 MG: 40 INJECTION, POWDER, FOR SOLUTION INTRAMUSCULAR; INTRAVENOUS at 16:00

## 2019-01-01 RX ADMIN — Medication 10 ML: at 22:10

## 2019-01-01 RX ADMIN — IPRATROPIUM BROMIDE AND ALBUTEROL SULFATE 1 AMPULE: .5; 3 SOLUTION RESPIRATORY (INHALATION) at 07:36

## 2019-01-01 RX ADMIN — CLOPIDOGREL 75 MG: 75 TABLET, FILM COATED ORAL at 09:34

## 2019-01-01 RX ADMIN — Medication 10 ML: at 21:35

## 2019-01-01 RX ADMIN — DEXTROSE MONOHYDRATE 100 ML/HR: 50 INJECTION, SOLUTION INTRAVENOUS at 00:22

## 2019-01-01 RX ADMIN — IPRATROPIUM BROMIDE AND ALBUTEROL SULFATE 1 AMPULE: .5; 3 SOLUTION RESPIRATORY (INHALATION) at 15:49

## 2019-01-01 RX ADMIN — DILTIAZEM HYDROCHLORIDE 180 MG: 180 CAPSULE, COATED, EXTENDED RELEASE ORAL at 09:41

## 2019-01-01 RX ADMIN — APIXABAN 5 MG: 5 TABLET, FILM COATED ORAL at 04:22

## 2019-01-01 RX ADMIN — IPRATROPIUM BROMIDE AND ALBUTEROL SULFATE 1 AMPULE: .5; 3 SOLUTION RESPIRATORY (INHALATION) at 21:01

## 2019-01-01 RX ADMIN — TIOTROPIUM BROMIDE 18 MCG: 18 CAPSULE ORAL; RESPIRATORY (INHALATION) at 09:49

## 2019-01-01 RX ADMIN — METHYLPREDNISOLONE SODIUM SUCCINATE 40 MG: 40 INJECTION, POWDER, FOR SOLUTION INTRAMUSCULAR; INTRAVENOUS at 05:56

## 2019-01-01 RX ADMIN — METOPROLOL TARTRATE 5 MG: 5 INJECTION INTRAVENOUS at 13:21

## 2019-01-01 RX ADMIN — Medication 10 ML: at 21:43

## 2019-01-01 RX ADMIN — BUDESONIDE 250 MCG: 0.25 SUSPENSION RESPIRATORY (INHALATION) at 20:26

## 2019-01-01 RX ADMIN — SODIUM CHLORIDE 250 ML: 9 INJECTION, SOLUTION INTRAVENOUS at 09:04

## 2019-01-01 RX ADMIN — CLONAZEPAM 0.5 MG: 0.5 TABLET ORAL at 20:58

## 2019-01-01 RX ADMIN — CLOPIDOGREL 75 MG: 75 TABLET, FILM COATED ORAL at 09:01

## 2019-01-01 RX ADMIN — SULFAMETHOXAZOLE AND TRIMETHOPRIM 2 TABLET: 800; 160 TABLET ORAL at 21:24

## 2019-01-01 RX ADMIN — FORMOTEROL FUMARATE DIHYDRATE 20 MCG: 20 SOLUTION RESPIRATORY (INHALATION) at 09:46

## 2019-01-01 RX ADMIN — HEPARIN SODIUM 5000 UNITS: 10000 INJECTION INTRAVENOUS; SUBCUTANEOUS at 05:54

## 2019-01-01 RX ADMIN — HEPARIN SODIUM 5000 UNITS: 10000 INJECTION INTRAVENOUS; SUBCUTANEOUS at 05:21

## 2019-01-01 RX ADMIN — HEPARIN SODIUM 5000 UNITS: 10000 INJECTION INTRAVENOUS; SUBCUTANEOUS at 21:20

## 2019-01-01 RX ADMIN — HYDRALAZINE HYDROCHLORIDE 50 MG: 50 TABLET, FILM COATED ORAL at 21:27

## 2019-01-01 RX ADMIN — INSULIN LISPRO 3 UNITS: 100 INJECTION, SOLUTION INTRAVENOUS; SUBCUTANEOUS at 06:08

## 2019-01-01 RX ADMIN — Medication 10 ML: at 10:38

## 2019-01-01 RX ADMIN — ROFLUMILAST 500 MCG: 500 TABLET ORAL at 08:33

## 2019-01-01 RX ADMIN — INSULIN LISPRO 3 UNITS: 100 INJECTION, SOLUTION INTRAVENOUS; SUBCUTANEOUS at 03:11

## 2019-01-01 RX ADMIN — DILTIAZEM HYDROCHLORIDE 180 MG: 180 CAPSULE, COATED, EXTENDED RELEASE ORAL at 09:37

## 2019-01-01 RX ADMIN — HYDRALAZINE HYDROCHLORIDE 10 MG: 20 INJECTION INTRAMUSCULAR; INTRAVENOUS at 23:37

## 2019-01-01 RX ADMIN — ATORVASTATIN CALCIUM 80 MG: 40 TABLET, FILM COATED ORAL at 22:31

## 2019-01-01 RX ADMIN — INSULIN LISPRO 3 UNITS: 100 INJECTION, SOLUTION INTRAVENOUS; SUBCUTANEOUS at 15:31

## 2019-01-01 RX ADMIN — CALCIUM CARBONATE-VITAMIN D TAB 500 MG-200 UNIT 1 TABLET: 500-200 TAB at 22:58

## 2019-01-01 RX ADMIN — FUROSEMIDE 20 MG: 10 INJECTION, SOLUTION INTRAVENOUS at 10:27

## 2019-01-01 RX ADMIN — CITALOPRAM 20 MG: 20 TABLET, FILM COATED ORAL at 22:11

## 2019-01-01 RX ADMIN — DILTIAZEM HYDROCHLORIDE 180 MG: 180 CAPSULE, COATED, EXTENDED RELEASE ORAL at 08:49

## 2019-01-01 RX ADMIN — IPRATROPIUM BROMIDE AND ALBUTEROL SULFATE 1 AMPULE: .5; 3 SOLUTION RESPIRATORY (INHALATION) at 11:20

## 2019-01-01 RX ADMIN — AMIODARONE HYDROCHLORIDE 200 MG: 200 TABLET ORAL at 20:50

## 2019-01-01 RX ADMIN — PROPOFOL 20 MCG/KG/MIN: 10 INJECTION, EMULSION INTRAVENOUS at 05:42

## 2019-01-01 RX ADMIN — SULFAMETHOXAZOLE AND TRIMETHOPRIM 2 TABLET: 800; 160 TABLET ORAL at 20:50

## 2019-01-01 RX ADMIN — PROPOFOL 30 MCG/KG/MIN: 10 INJECTION, EMULSION INTRAVENOUS at 20:04

## 2019-01-01 RX ADMIN — SODIUM CHLORIDE 1000 ML: 9 INJECTION, SOLUTION INTRAVENOUS at 13:50

## 2019-01-01 RX ADMIN — IPRATROPIUM BROMIDE AND ALBUTEROL SULFATE 1 AMPULE: .5; 3 SOLUTION RESPIRATORY (INHALATION) at 11:37

## 2019-01-01 RX ADMIN — AMIODARONE HYDROCHLORIDE 200 MG: 200 TABLET ORAL at 21:20

## 2019-01-01 RX ADMIN — Medication 10 ML: at 12:21

## 2019-01-01 RX ADMIN — FORMOTEROL FUMARATE DIHYDRATE 20 MCG: 20 SOLUTION RESPIRATORY (INHALATION) at 20:16

## 2019-01-01 RX ADMIN — PANTOPRAZOLE SODIUM 40 MG: 40 TABLET, DELAYED RELEASE ORAL at 08:08

## 2019-01-01 RX ADMIN — IPRATROPIUM BROMIDE AND ALBUTEROL SULFATE 1 AMPULE: .5; 3 SOLUTION RESPIRATORY (INHALATION) at 09:05

## 2019-01-01 RX ADMIN — BUDESONIDE 250 MCG: 0.25 SUSPENSION RESPIRATORY (INHALATION) at 20:18

## 2019-01-01 RX ADMIN — METOPROLOL TARTRATE 25 MG: 25 TABLET ORAL at 09:34

## 2019-01-01 RX ADMIN — IPRATROPIUM BROMIDE AND ALBUTEROL SULFATE 1 AMPULE: .5; 3 SOLUTION RESPIRATORY (INHALATION) at 15:47

## 2019-01-01 RX ADMIN — SULFAMETHOXAZOLE AND TRIMETHOPRIM 1 TABLET: 800; 160 TABLET ORAL at 08:09

## 2019-01-01 RX ADMIN — INSULIN LISPRO 2 UNITS: 100 INJECTION, SOLUTION INTRAVENOUS; SUBCUTANEOUS at 13:02

## 2019-01-01 RX ADMIN — MORPHINE SULFATE 2 MG: 2 INJECTION, SOLUTION INTRAMUSCULAR; INTRAVENOUS at 10:53

## 2019-01-01 RX ADMIN — IPRATROPIUM BROMIDE AND ALBUTEROL SULFATE 1 AMPULE: .5; 3 SOLUTION RESPIRATORY (INHALATION) at 12:20

## 2019-01-01 RX ADMIN — ACETAMINOPHEN 650 MG: 325 TABLET, FILM COATED ORAL at 17:06

## 2019-01-01 RX ADMIN — IPRATROPIUM BROMIDE AND ALBUTEROL SULFATE 1 AMPULE: .5; 3 SOLUTION RESPIRATORY (INHALATION) at 21:11

## 2019-01-01 RX ADMIN — PANTOPRAZOLE SODIUM 40 MG: 40 INJECTION, POWDER, FOR SOLUTION INTRAVENOUS at 09:00

## 2019-01-01 RX ADMIN — BUDESONIDE 250 MCG: 0.25 SUSPENSION RESPIRATORY (INHALATION) at 21:15

## 2019-01-01 RX ADMIN — SODIUM CHLORIDE 250 ML: 9 INJECTION, SOLUTION INTRAVENOUS at 16:47

## 2019-01-01 RX ADMIN — IPRATROPIUM BROMIDE AND ALBUTEROL SULFATE 1 AMPULE: .5; 3 SOLUTION RESPIRATORY (INHALATION) at 16:49

## 2019-01-01 RX ADMIN — HYDRALAZINE HYDROCHLORIDE 25 MG: 25 TABLET, FILM COATED ORAL at 21:55

## 2019-01-01 RX ADMIN — INSULIN LISPRO 2 UNITS: 100 INJECTION, SOLUTION INTRAVENOUS; SUBCUTANEOUS at 11:47

## 2019-01-01 RX ADMIN — MOMETASONE FUROATE AND FORMOTEROL FUMARATE DIHYDRATE 2 PUFF: 100; 5 AEROSOL RESPIRATORY (INHALATION) at 22:28

## 2019-01-01 RX ADMIN — IPRATROPIUM BROMIDE AND ALBUTEROL SULFATE 1 AMPULE: .5; 3 SOLUTION RESPIRATORY (INHALATION) at 08:17

## 2019-01-01 RX ADMIN — IPRATROPIUM BROMIDE AND ALBUTEROL SULFATE 1 AMPULE: .5; 3 SOLUTION RESPIRATORY (INHALATION) at 23:45

## 2019-01-01 RX ADMIN — CLOPIDOGREL 75 MG: 75 TABLET, FILM COATED ORAL at 09:38

## 2019-01-01 RX ADMIN — PROPOFOL 25 MCG/KG/MIN: 10 INJECTION, EMULSION INTRAVENOUS at 12:17

## 2019-01-01 RX ADMIN — CLONAZEPAM 0.5 MG: 0.5 TABLET ORAL at 09:01

## 2019-01-01 RX ADMIN — ACETAMINOPHEN 650 MG: 325 TABLET, FILM COATED ORAL at 09:00

## 2019-01-01 RX ADMIN — DILTIAZEM HYDROCHLORIDE 180 MG: 180 CAPSULE, EXTENDED RELEASE ORAL at 08:45

## 2019-01-01 RX ADMIN — SULFAMETHOXAZOLE AND TRIMETHOPRIM 2 TABLET: 800; 160 TABLET ORAL at 21:20

## 2019-01-01 RX ADMIN — INSULIN LISPRO 1 UNITS: 100 INJECTION, SOLUTION INTRAVENOUS; SUBCUTANEOUS at 06:30

## 2019-01-01 RX ADMIN — FORMOTEROL FUMARATE DIHYDRATE 20 MCG: 20 SOLUTION RESPIRATORY (INHALATION) at 09:22

## 2019-01-01 RX ADMIN — INSULIN LISPRO 1 UNITS: 100 INJECTION, SOLUTION INTRAVENOUS; SUBCUTANEOUS at 21:28

## 2019-01-01 RX ADMIN — METHYLPREDNISOLONE SODIUM SUCCINATE 40 MG: 40 INJECTION, POWDER, FOR SOLUTION INTRAMUSCULAR; INTRAVENOUS at 20:07

## 2019-01-01 RX ADMIN — DILTIAZEM HYDROCHLORIDE 180 MG: 180 CAPSULE, COATED, EXTENDED RELEASE ORAL at 09:58

## 2019-01-01 RX ADMIN — BUDESONIDE 250 MCG: 0.25 SUSPENSION RESPIRATORY (INHALATION) at 20:28

## 2019-01-01 RX ADMIN — IPRATROPIUM BROMIDE AND ALBUTEROL SULFATE 1 AMPULE: .5; 3 SOLUTION RESPIRATORY (INHALATION) at 13:57

## 2019-01-01 RX ADMIN — IPRATROPIUM BROMIDE AND ALBUTEROL SULFATE 1 AMPULE: .5; 3 SOLUTION RESPIRATORY (INHALATION) at 11:35

## 2019-01-01 RX ADMIN — APIXABAN 5 MG: 5 TABLET, FILM COATED ORAL at 21:20

## 2019-01-01 RX ADMIN — IPRATROPIUM BROMIDE AND ALBUTEROL SULFATE 1 AMPULE: .5; 3 SOLUTION RESPIRATORY (INHALATION) at 11:55

## 2019-01-01 RX ADMIN — RACEPINEPHRINE HYDROCHLORIDE 11.25 MG: 11.25 SOLUTION RESPIRATORY (INHALATION) at 12:08

## 2019-01-01 RX ADMIN — PIPERACILLIN SODIUM AND TAZOBACTAM SODIUM 3.38 G: 3; .375 INJECTION, POWDER, LYOPHILIZED, FOR SOLUTION INTRAVENOUS at 09:04

## 2019-01-01 RX ADMIN — SODIUM CHLORIDE: 9 INJECTION, SOLUTION INTRAVENOUS at 20:23

## 2019-01-01 RX ADMIN — CALCIUM CARBONATE-VITAMIN D TAB 500 MG-200 UNIT 1 TABLET: 500-200 TAB at 22:25

## 2019-01-01 RX ADMIN — BUDESONIDE 250 MCG: 0.25 SUSPENSION RESPIRATORY (INHALATION) at 21:48

## 2019-01-01 RX ADMIN — IPRATROPIUM BROMIDE AND ALBUTEROL SULFATE 1 AMPULE: .5; 3 SOLUTION RESPIRATORY (INHALATION) at 08:29

## 2019-01-01 RX ADMIN — FORMOTEROL FUMARATE DIHYDRATE 20 MCG: 20 SOLUTION RESPIRATORY (INHALATION) at 19:54

## 2019-01-01 RX ADMIN — INSULIN LISPRO 1 UNITS: 100 INJECTION, SOLUTION INTRAVENOUS; SUBCUTANEOUS at 05:40

## 2019-01-01 RX ADMIN — SULFAMETHOXAZOLE AND TRIMETHOPRIM 1 TABLET: 800; 160 TABLET ORAL at 22:31

## 2019-01-01 RX ADMIN — INSULIN LISPRO 6 UNITS: 100 INJECTION, SOLUTION INTRAVENOUS; SUBCUTANEOUS at 23:51

## 2019-01-01 RX ADMIN — LINAGLIPTIN 5 MG: 5 TABLET, FILM COATED ORAL at 09:52

## 2019-01-01 RX ADMIN — PROPOFOL 25 MCG/KG/MIN: 10 INJECTION, EMULSION INTRAVENOUS at 10:41

## 2019-01-01 RX ADMIN — ATORVASTATIN CALCIUM 80 MG: 40 TABLET, FILM COATED ORAL at 22:59

## 2019-01-01 RX ADMIN — PROPOFOL 30 MCG/KG/MIN: 10 INJECTION, EMULSION INTRAVENOUS at 14:03

## 2019-01-01 RX ADMIN — PIPERACILLIN SODIUM AND TAZOBACTAM SODIUM 3.38 G: 3; .375 INJECTION, POWDER, LYOPHILIZED, FOR SOLUTION INTRAVENOUS at 21:08

## 2019-01-01 RX ADMIN — FORMOTEROL FUMARATE DIHYDRATE 20 MCG: 20 SOLUTION RESPIRATORY (INHALATION) at 10:06

## 2019-01-01 RX ADMIN — NADOLOL 20 MG: 20 TABLET ORAL at 09:03

## 2019-01-01 RX ADMIN — SODIUM CHLORIDE 250 ML: 9 INJECTION, SOLUTION INTRAVENOUS at 17:55

## 2019-01-01 RX ADMIN — MORPHINE SULFATE 2 MG: 2 INJECTION, SOLUTION INTRAMUSCULAR; INTRAVENOUS at 19:39

## 2019-01-01 RX ADMIN — INSULIN LISPRO 2 UNITS: 100 INJECTION, SOLUTION INTRAVENOUS; SUBCUTANEOUS at 17:22

## 2019-01-01 RX ADMIN — BUDESONIDE 250 MCG: 0.25 SUSPENSION RESPIRATORY (INHALATION) at 20:05

## 2019-01-01 RX ADMIN — BUDESONIDE 250 MCG: 0.25 SUSPENSION RESPIRATORY (INHALATION) at 09:31

## 2019-01-01 RX ADMIN — Medication 10 ML: at 21:32

## 2019-01-01 RX ADMIN — FUROSEMIDE 20 MG: 20 TABLET ORAL at 08:49

## 2019-01-01 RX ADMIN — METHYLPREDNISOLONE SODIUM SUCCINATE 40 MG: 40 INJECTION, POWDER, FOR SOLUTION INTRAMUSCULAR; INTRAVENOUS at 08:47

## 2019-01-01 RX ADMIN — AMIODARONE HYDROCHLORIDE 200 MG: 200 TABLET ORAL at 08:08

## 2019-01-01 RX ADMIN — HYDRALAZINE HYDROCHLORIDE 50 MG: 50 TABLET, FILM COATED ORAL at 14:41

## 2019-01-01 RX ADMIN — Medication 10 ML: at 09:54

## 2019-01-01 RX ADMIN — PROPOFOL 40 MCG/KG/MIN: 10 INJECTION, EMULSION INTRAVENOUS at 17:41

## 2019-01-01 RX ADMIN — LORAZEPAM 0.5 MG: 2 INJECTION INTRAMUSCULAR; INTRAVENOUS at 20:48

## 2019-01-01 RX ADMIN — INSULIN LISPRO 4 UNITS: 100 INJECTION, SOLUTION INTRAVENOUS; SUBCUTANEOUS at 17:26

## 2019-01-01 RX ADMIN — CITALOPRAM 20 MG: 20 TABLET, FILM COATED ORAL at 21:55

## 2019-01-01 RX ADMIN — METHYLPREDNISOLONE SODIUM SUCCINATE 40 MG: 40 INJECTION, POWDER, FOR SOLUTION INTRAMUSCULAR; INTRAVENOUS at 23:51

## 2019-01-01 RX ADMIN — PIPERACILLIN SODIUM AND TAZOBACTAM SODIUM 3.38 G: 3; .375 INJECTION, POWDER, LYOPHILIZED, FOR SOLUTION INTRAVENOUS at 06:14

## 2019-01-01 RX ADMIN — PIPERACILLIN SODIUM AND TAZOBACTAM SODIUM 3.38 G: 3; .375 INJECTION, POWDER, LYOPHILIZED, FOR SOLUTION INTRAVENOUS at 00:10

## 2019-01-01 RX ADMIN — CLONAZEPAM 0.5 MG: 0.5 TABLET ORAL at 08:33

## 2019-01-01 RX ADMIN — SULFAMETHOXAZOLE AND TRIMETHOPRIM 2 TABLET: 800; 160 TABLET ORAL at 08:33

## 2019-01-01 RX ADMIN — SULFAMETHOXAZOLE AND TRIMETHOPRIM 365 MG: 80; 16 INJECTION, SOLUTION, CONCENTRATE INTRAVENOUS at 21:20

## 2019-01-01 RX ADMIN — Medication 10 ML: at 10:36

## 2019-01-01 RX ADMIN — INSULIN LISPRO 1 UNITS: 100 INJECTION, SOLUTION INTRAVENOUS; SUBCUTANEOUS at 17:52

## 2019-01-01 RX ADMIN — LEVOTHYROXINE SODIUM 137 MCG: 137 TABLET ORAL at 08:10

## 2019-01-01 RX ADMIN — GLYCOPYRROLATE 0.2 MG: 0.2 INJECTION INTRAMUSCULAR; INTRAVENOUS at 10:56

## 2019-01-01 RX ADMIN — HYDRALAZINE HYDROCHLORIDE 25 MG: 25 TABLET, FILM COATED ORAL at 14:50

## 2019-01-01 RX ADMIN — SULFAMETHOXAZOLE AND TRIMETHOPRIM 365 MG: 80; 16 INJECTION, SOLUTION, CONCENTRATE INTRAVENOUS at 08:44

## 2019-01-01 RX ADMIN — SODIUM CHLORIDE, POTASSIUM CHLORIDE, SODIUM LACTATE AND CALCIUM CHLORIDE: 600; 310; 30; 20 INJECTION, SOLUTION INTRAVENOUS at 10:26

## 2019-01-01 RX ADMIN — DIPHENHYDRAMINE HCL 25 MG: 25 TABLET ORAL at 21:09

## 2019-01-01 RX ADMIN — IPRATROPIUM BROMIDE AND ALBUTEROL SULFATE 1 AMPULE: .5; 3 SOLUTION RESPIRATORY (INHALATION) at 02:16

## 2019-01-01 RX ADMIN — IPRATROPIUM BROMIDE AND ALBUTEROL SULFATE 1 AMPULE: .5; 3 SOLUTION RESPIRATORY (INHALATION) at 12:06

## 2019-01-01 RX ADMIN — FORMOTEROL FUMARATE DIHYDRATE 20 MCG: 20 SOLUTION RESPIRATORY (INHALATION) at 21:47

## 2019-01-01 RX ADMIN — BUDESONIDE 250 MCG: 0.25 SUSPENSION RESPIRATORY (INHALATION) at 19:33

## 2019-01-01 RX ADMIN — ACETAMINOPHEN 650 MG: 325 TABLET, FILM COATED ORAL at 05:34

## 2019-01-01 RX ADMIN — AMIODARONE HYDROCHLORIDE 200 MG: 200 TABLET ORAL at 08:45

## 2019-01-01 RX ADMIN — ATORVASTATIN CALCIUM 80 MG: 40 TABLET, FILM COATED ORAL at 22:09

## 2019-01-01 RX ADMIN — HYDRALAZINE HYDROCHLORIDE 50 MG: 50 TABLET, FILM COATED ORAL at 22:31

## 2019-01-01 RX ADMIN — IPRATROPIUM BROMIDE AND ALBUTEROL SULFATE 1 AMPULE: .5; 3 SOLUTION RESPIRATORY (INHALATION) at 13:02

## 2019-01-01 RX ADMIN — TIOTROPIUM BROMIDE 18 MCG: 18 CAPSULE ORAL; RESPIRATORY (INHALATION) at 09:11

## 2019-01-01 RX ADMIN — AMIODARONE HYDROCHLORIDE 200 MG: 200 TABLET ORAL at 09:04

## 2019-01-01 RX ADMIN — LEVOTHYROXINE SODIUM 137 MCG: 137 TABLET ORAL at 06:35

## 2019-01-01 RX ADMIN — IPRATROPIUM BROMIDE AND ALBUTEROL SULFATE 1 AMPULE: .5; 3 SOLUTION RESPIRATORY (INHALATION) at 21:30

## 2019-01-01 RX ADMIN — GLYCOPYRROLATE AND FORMOTEROL FUMARATE 2 PUFF: 9; 4.8 AEROSOL, METERED RESPIRATORY (INHALATION) at 20:57

## 2019-01-01 RX ADMIN — PROPOFOL 30 MCG/KG/MIN: 10 INJECTION, EMULSION INTRAVENOUS at 02:24

## 2019-01-01 RX ADMIN — OXYCODONE HYDROCHLORIDE 20 MG: 10 TABLET ORAL at 16:03

## 2019-01-01 RX ADMIN — FLUTICASONE PROPIONATE 1 PUFF: 110 AEROSOL, METERED RESPIRATORY (INHALATION) at 08:33

## 2019-01-01 RX ADMIN — FENOFIBRATE 54 MG: 54 TABLET ORAL at 09:40

## 2019-01-01 RX ADMIN — PANTOPRAZOLE SODIUM 40 MG: 40 INJECTION, POWDER, FOR SOLUTION INTRAVENOUS at 09:04

## 2019-01-01 RX ADMIN — CITALOPRAM 20 MG: 20 TABLET, FILM COATED ORAL at 22:58

## 2019-01-01 RX ADMIN — PROPOFOL 10 MCG/KG/MIN: 10 INJECTION, EMULSION INTRAVENOUS at 06:52

## 2019-01-01 RX ADMIN — AMIODARONE HYDROCHLORIDE 200 MG: 200 TABLET ORAL at 08:33

## 2019-01-01 RX ADMIN — Medication 3 MG: at 21:52

## 2019-01-01 RX ADMIN — IPRATROPIUM BROMIDE AND ALBUTEROL SULFATE 1 AMPULE: .5; 3 SOLUTION RESPIRATORY (INHALATION) at 19:50

## 2019-01-01 RX ADMIN — PIPERACILLIN SODIUM AND TAZOBACTAM SODIUM 3.38 G: 3; .375 INJECTION, POWDER, LYOPHILIZED, FOR SOLUTION INTRAVENOUS at 17:41

## 2019-01-01 RX ADMIN — PANTOPRAZOLE SODIUM 40 MG: 40 INJECTION, POWDER, LYOPHILIZED, FOR SOLUTION INTRAVENOUS at 09:17

## 2019-01-01 RX ADMIN — INSULIN LISPRO 1 UNITS: 100 INJECTION, SOLUTION INTRAVENOUS; SUBCUTANEOUS at 06:12

## 2019-01-01 RX ADMIN — CALCIUM CARBONATE-VITAMIN D TAB 500 MG-200 UNIT 1 TABLET: 500-200 TAB at 09:41

## 2019-01-01 RX ADMIN — HEPARIN SODIUM 5000 UNITS: 10000 INJECTION INTRAVENOUS; SUBCUTANEOUS at 06:14

## 2019-01-01 RX ADMIN — IPRATROPIUM BROMIDE AND ALBUTEROL SULFATE 1 AMPULE: .5; 3 SOLUTION RESPIRATORY (INHALATION) at 06:18

## 2019-01-01 RX ADMIN — ROFLUMILAST 500 MCG: 500 TABLET ORAL at 09:38

## 2019-01-01 RX ADMIN — LORAZEPAM 2 MG: 2 INJECTION INTRAMUSCULAR at 10:56

## 2019-01-01 RX ADMIN — CALCIUM GLUCONATE 1 G: 98 INJECTION, SOLUTION INTRAVENOUS at 10:53

## 2019-01-01 RX ADMIN — ACETAMINOPHEN 650 MG: 325 TABLET, FILM COATED ORAL at 17:57

## 2019-01-01 RX ADMIN — INSULIN LISPRO 1 UNITS: 100 INJECTION, SOLUTION INTRAVENOUS; SUBCUTANEOUS at 22:11

## 2019-01-01 RX ADMIN — CYANOCOBALAMIN 1000 MCG: 1000 INJECTION, SOLUTION INTRAMUSCULAR at 17:37

## 2019-01-01 RX ADMIN — Medication 10 ML: at 07:00

## 2019-01-01 RX ADMIN — HYDRALAZINE HYDROCHLORIDE 50 MG: 25 TABLET, FILM COATED ORAL at 05:38

## 2019-01-01 RX ADMIN — Medication 10 ML: at 10:22

## 2019-01-01 RX ADMIN — SODIUM CHLORIDE: 9 INJECTION, SOLUTION INTRAVENOUS at 10:53

## 2019-01-01 RX ADMIN — IPRATROPIUM BROMIDE AND ALBUTEROL SULFATE 1 AMPULE: .5; 3 SOLUTION RESPIRATORY (INHALATION) at 20:18

## 2019-01-01 RX ADMIN — CALCIUM CARBONATE-VITAMIN D TAB 500 MG-200 UNIT 1 TABLET: 500-200 TAB at 09:01

## 2019-01-01 RX ADMIN — IPRATROPIUM BROMIDE AND ALBUTEROL SULFATE 1 AMPULE: .5; 3 SOLUTION RESPIRATORY (INHALATION) at 12:12

## 2019-01-01 RX ADMIN — PANTOPRAZOLE SODIUM 40 MG: 40 INJECTION, POWDER, FOR SOLUTION INTRAVENOUS at 08:12

## 2019-01-01 RX ADMIN — PROPOFOL 15 MCG/KG/MIN: 10 INJECTION, EMULSION INTRAVENOUS at 20:43

## 2019-01-01 RX ADMIN — IPRATROPIUM BROMIDE AND ALBUTEROL SULFATE 1 AMPULE: .5; 3 SOLUTION RESPIRATORY (INHALATION) at 15:34

## 2019-01-01 RX ADMIN — Medication 10 ML: at 20:55

## 2019-01-01 RX ADMIN — Medication 10 ML: at 20:54

## 2019-01-01 RX ADMIN — HEPARIN SODIUM 5000 UNITS: 10000 INJECTION INTRAVENOUS; SUBCUTANEOUS at 13:09

## 2019-01-01 RX ADMIN — IPRATROPIUM BROMIDE AND ALBUTEROL SULFATE 1 AMPULE: .5; 3 SOLUTION RESPIRATORY (INHALATION) at 15:22

## 2019-01-01 RX ADMIN — ASPIRIN 325 MG: 325 TABLET, COATED ORAL at 09:37

## 2019-01-01 RX ADMIN — INSULIN LISPRO 3 UNITS: 100 INJECTION, SOLUTION INTRAVENOUS; SUBCUTANEOUS at 21:04

## 2019-01-01 RX ADMIN — DILTIAZEM HYDROCHLORIDE 180 MG: 180 CAPSULE, EXTENDED RELEASE ORAL at 09:35

## 2019-01-01 RX ADMIN — MOMETASONE FUROATE AND FORMOTEROL FUMARATE DIHYDRATE 2 PUFF: 100; 5 AEROSOL RESPIRATORY (INHALATION) at 09:07

## 2019-01-01 RX ADMIN — PROPOFOL 25 MCG/KG/MIN: 10 INJECTION, EMULSION INTRAVENOUS at 16:29

## 2019-01-01 RX ADMIN — PANTOPRAZOLE SODIUM 40 MG: 40 INJECTION, POWDER, FOR SOLUTION INTRAVENOUS at 08:24

## 2019-01-01 RX ADMIN — SULFAMETHOXAZOLE AND TRIMETHOPRIM 365 MG: 80; 16 INJECTION, SOLUTION, CONCENTRATE INTRAVENOUS at 08:48

## 2019-01-01 RX ADMIN — HYDRALAZINE HYDROCHLORIDE 50 MG: 50 TABLET, FILM COATED ORAL at 15:00

## 2019-01-01 RX ADMIN — CITALOPRAM 20 MG: 20 TABLET, FILM COATED ORAL at 22:31

## 2019-01-01 RX ADMIN — SODIUM CHLORIDE 500 ML: 9 INJECTION, SOLUTION INTRAVENOUS at 15:25

## 2019-01-01 RX ADMIN — FORMOTEROL FUMARATE DIHYDRATE 20 MCG: 20 SOLUTION RESPIRATORY (INHALATION) at 19:32

## 2019-01-01 RX ADMIN — HYDRALAZINE HYDROCHLORIDE 50 MG: 50 TABLET, FILM COATED ORAL at 06:27

## 2019-01-01 RX ADMIN — FORMOTEROL FUMARATE DIHYDRATE 20 MCG: 20 SOLUTION RESPIRATORY (INHALATION) at 07:53

## 2019-01-01 RX ADMIN — FENTANYL CITRATE 50 MCG: 50 INJECTION, SOLUTION INTRAMUSCULAR; INTRAVENOUS at 14:37

## 2019-01-01 RX ADMIN — DEXMEDETOMIDINE HYDROCHLORIDE 0.6 MCG/KG/HR: 100 INJECTION, SOLUTION INTRAVENOUS at 23:20

## 2019-01-01 RX ADMIN — HYDRALAZINE HYDROCHLORIDE 50 MG: 50 TABLET, FILM COATED ORAL at 15:28

## 2019-01-01 RX ADMIN — METHYLPREDNISOLONE SODIUM SUCCINATE 40 MG: 40 INJECTION, POWDER, FOR SOLUTION INTRAMUSCULAR; INTRAVENOUS at 21:58

## 2019-01-01 RX ADMIN — ROFLUMILAST 500 MCG: 500 TABLET ORAL at 08:09

## 2019-01-01 RX ADMIN — APIXABAN 5 MG: 5 TABLET, FILM COATED ORAL at 18:30

## 2019-01-01 RX ADMIN — PROPOFOL 40 MCG/KG/MIN: 10 INJECTION, EMULSION INTRAVENOUS at 21:02

## 2019-01-01 RX ADMIN — BUDESONIDE 250 MCG: 0.25 SUSPENSION RESPIRATORY (INHALATION) at 08:17

## 2019-01-01 RX ADMIN — PIPERACILLIN SODIUM AND TAZOBACTAM SODIUM 3.38 G: 3; .375 INJECTION, POWDER, LYOPHILIZED, FOR SOLUTION INTRAVENOUS at 09:30

## 2019-01-01 RX ADMIN — ACETAMINOPHEN 650 MG: 325 TABLET, FILM COATED ORAL at 21:22

## 2019-01-01 RX ADMIN — Medication 10 ML: at 21:08

## 2019-01-01 RX ADMIN — FORMOTEROL FUMARATE DIHYDRATE 20 MCG: 20 SOLUTION RESPIRATORY (INHALATION) at 09:05

## 2019-01-01 RX ADMIN — LEVOTHYROXINE SODIUM 137 MCG: 137 TABLET ORAL at 05:43

## 2019-01-01 RX ADMIN — CLONAZEPAM 0.5 MG: 0.5 TABLET ORAL at 21:02

## 2019-01-01 RX ADMIN — FORMOTEROL FUMARATE DIHYDRATE 20 MCG: 20 SOLUTION RESPIRATORY (INHALATION) at 21:08

## 2019-01-01 RX ADMIN — IPRATROPIUM BROMIDE AND ALBUTEROL SULFATE 1 AMPULE: .5; 3 SOLUTION RESPIRATORY (INHALATION) at 17:46

## 2019-01-01 RX ADMIN — PANTOPRAZOLE SODIUM 40 MG: 40 INJECTION, POWDER, FOR SOLUTION INTRAVENOUS at 08:37

## 2019-01-01 RX ADMIN — ROFLUMILAST 500 MCG: 500 TABLET ORAL at 20:25

## 2019-01-01 RX ADMIN — IPRATROPIUM BROMIDE AND ALBUTEROL SULFATE 1 AMPULE: .5; 3 SOLUTION RESPIRATORY (INHALATION) at 09:06

## 2019-01-01 RX ADMIN — HYDRALAZINE HYDROCHLORIDE 10 MG: 20 INJECTION INTRAMUSCULAR; INTRAVENOUS at 16:23

## 2019-01-01 RX ADMIN — FORMOTEROL FUMARATE DIHYDRATE 20 MCG: 20 SOLUTION RESPIRATORY (INHALATION) at 08:17

## 2019-01-01 RX ADMIN — IPRATROPIUM BROMIDE AND ALBUTEROL SULFATE 1 AMPULE: .5; 3 SOLUTION RESPIRATORY (INHALATION) at 11:50

## 2019-01-01 RX ADMIN — HYDRALAZINE HYDROCHLORIDE 10 MG: 20 INJECTION INTRAMUSCULAR; INTRAVENOUS at 08:33

## 2019-01-01 RX ADMIN — MIDAZOLAM HYDROCHLORIDE 4 MG: 1 INJECTION INTRAMUSCULAR; INTRAVENOUS at 16:15

## 2019-01-01 RX ADMIN — DOCUSATE SODIUM 100 MG: 100 CAPSULE, LIQUID FILLED ORAL at 20:58

## 2019-01-01 RX ADMIN — IPRATROPIUM BROMIDE AND ALBUTEROL SULFATE 1 AMPULE: .5; 3 SOLUTION RESPIRATORY (INHALATION) at 10:06

## 2019-01-01 RX ADMIN — PANTOPRAZOLE SODIUM 40 MG: 40 INJECTION, POWDER, FOR SOLUTION INTRAVENOUS at 10:36

## 2019-01-01 RX ADMIN — ROFLUMILAST 500 MCG: 500 TABLET ORAL at 09:34

## 2019-01-01 RX ADMIN — APIXABAN 5 MG: 5 TABLET, FILM COATED ORAL at 06:27

## 2019-01-01 RX ADMIN — PANTOPRAZOLE SODIUM 40 MG: 40 INJECTION, POWDER, FOR SOLUTION INTRAVENOUS at 22:12

## 2019-01-01 RX ADMIN — SULFAMETHOXAZOLE AND TRIMETHOPRIM 365 MG: 80; 16 INJECTION, SOLUTION, CONCENTRATE INTRAVENOUS at 22:02

## 2019-01-01 RX ADMIN — INSULIN LISPRO 1 UNITS: 100 INJECTION, SOLUTION INTRAVENOUS; SUBCUTANEOUS at 06:43

## 2019-01-01 RX ADMIN — BUDESONIDE 250 MCG: 0.25 SUSPENSION RESPIRATORY (INHALATION) at 08:11

## 2019-01-01 RX ADMIN — BUDESONIDE 250 MCG: 0.25 SUSPENSION RESPIRATORY (INHALATION) at 08:29

## 2019-01-01 RX ADMIN — DILTIAZEM HYDROCHLORIDE 180 MG: 180 CAPSULE, COATED, EXTENDED RELEASE ORAL at 09:03

## 2019-01-01 RX ADMIN — HYDRALAZINE HYDROCHLORIDE 50 MG: 50 TABLET, FILM COATED ORAL at 15:19

## 2019-01-01 RX ADMIN — METHYLPREDNISOLONE SODIUM SUCCINATE 40 MG: 40 INJECTION, POWDER, FOR SOLUTION INTRAMUSCULAR; INTRAVENOUS at 12:27

## 2019-01-01 RX ADMIN — FUROSEMIDE 20 MG: 20 TABLET ORAL at 09:58

## 2019-01-01 RX ADMIN — DOXYCYCLINE HYCLATE 100 MG: 100 CAPSULE ORAL at 15:44

## 2019-01-01 RX ADMIN — GLYCOPYRROLATE AND FORMOTEROL FUMARATE 2 PUFF: 9; 4.8 AEROSOL, METERED RESPIRATORY (INHALATION) at 21:02

## 2019-01-01 RX ADMIN — IPRATROPIUM BROMIDE AND ALBUTEROL SULFATE 1 AMPULE: .5; 3 SOLUTION RESPIRATORY (INHALATION) at 08:46

## 2019-01-01 RX ADMIN — IPRATROPIUM BROMIDE AND ALBUTEROL SULFATE 1 AMPULE: .5; 3 SOLUTION RESPIRATORY (INHALATION) at 20:16

## 2019-01-01 RX ADMIN — DOCUSATE SODIUM 100 MG: 100 CAPSULE, LIQUID FILLED ORAL at 09:35

## 2019-01-01 RX ADMIN — IPRATROPIUM BROMIDE AND ALBUTEROL SULFATE 1 AMPULE: .5; 3 SOLUTION RESPIRATORY (INHALATION) at 10:50

## 2019-01-01 RX ADMIN — PANTOPRAZOLE SODIUM 40 MG: 40 TABLET, DELAYED RELEASE ORAL at 09:34

## 2019-01-01 RX ADMIN — BUDESONIDE 250 MCG: 0.25 SUSPENSION RESPIRATORY (INHALATION) at 19:50

## 2019-01-01 RX ADMIN — Medication 10 ML: at 05:20

## 2019-01-01 RX ADMIN — HYDRALAZINE HYDROCHLORIDE 50 MG: 25 TABLET, FILM COATED ORAL at 15:00

## 2019-01-01 RX ADMIN — IPRATROPIUM BROMIDE AND ALBUTEROL SULFATE 1 AMPULE: .5; 3 SOLUTION RESPIRATORY (INHALATION) at 08:35

## 2019-01-01 RX ADMIN — BUDESONIDE 250 MCG: 0.25 SUSPENSION RESPIRATORY (INHALATION) at 08:40

## 2019-01-01 RX ADMIN — IPRATROPIUM BROMIDE AND ALBUTEROL SULFATE 1 AMPULE: .5; 3 SOLUTION RESPIRATORY (INHALATION) at 09:46

## 2019-01-01 RX ADMIN — ATORVASTATIN CALCIUM 80 MG: 40 TABLET, FILM COATED ORAL at 21:22

## 2019-01-01 RX ADMIN — METHYLPREDNISOLONE SODIUM SUCCINATE 40 MG: 40 INJECTION, POWDER, FOR SOLUTION INTRAMUSCULAR; INTRAVENOUS at 15:31

## 2019-01-01 RX ADMIN — SODIUM CHLORIDE: 9 INJECTION, SOLUTION INTRAVENOUS at 12:08

## 2019-01-01 RX ADMIN — LINAGLIPTIN 5 MG: 5 TABLET, FILM COATED ORAL at 09:37

## 2019-01-01 RX ADMIN — Medication 10 ML: at 21:22

## 2019-01-01 RX ADMIN — SODIUM POLYSTYRENE SULFONATE 15 G: 1 POWDER ORAL; RECTAL at 15:20

## 2019-01-01 RX ADMIN — PROPOFOL 20 MCG/KG/MIN: 10 INJECTION, EMULSION INTRAVENOUS at 20:22

## 2019-01-01 RX ADMIN — IPRATROPIUM BROMIDE AND ALBUTEROL SULFATE 1 AMPULE: .5; 3 SOLUTION RESPIRATORY (INHALATION) at 13:30

## 2019-01-01 RX ADMIN — CITALOPRAM 20 MG: 20 TABLET, FILM COATED ORAL at 21:24

## 2019-01-01 RX ADMIN — Medication 10 ML: at 21:24

## 2019-01-01 RX ADMIN — Medication 10 ML: at 06:20

## 2019-01-01 RX ADMIN — MIDAZOLAM HYDROCHLORIDE 2 MG: 2 INJECTION, SOLUTION INTRAMUSCULAR; INTRAVENOUS at 13:15

## 2019-01-01 RX ADMIN — CEFEPIME HYDROCHLORIDE 2 G: 2 INJECTION, POWDER, FOR SOLUTION INTRAVENOUS at 00:54

## 2019-01-01 RX ADMIN — IPRATROPIUM BROMIDE AND ALBUTEROL SULFATE 1 AMPULE: .5; 3 SOLUTION RESPIRATORY (INHALATION) at 19:32

## 2019-01-01 RX ADMIN — Medication 10 ML: at 16:58

## 2019-01-01 RX ADMIN — SULFAMETHOXAZOLE AND TRIMETHOPRIM 365 MG: 80; 16 INJECTION, SOLUTION, CONCENTRATE INTRAVENOUS at 22:03

## 2019-01-01 RX ADMIN — HEPARIN SODIUM 5000 UNITS: 10000 INJECTION INTRAVENOUS; SUBCUTANEOUS at 21:54

## 2019-01-01 RX ADMIN — BUDESONIDE 250 MCG: 0.25 SUSPENSION RESPIRATORY (INHALATION) at 08:55

## 2019-01-01 RX ADMIN — BUDESONIDE 250 MCG: 0.25 SUSPENSION RESPIRATORY (INHALATION) at 09:06

## 2019-01-01 RX ADMIN — SULFAMETHOXAZOLE AND TRIMETHOPRIM 365 MG: 80; 16 INJECTION, SOLUTION, CONCENTRATE INTRAVENOUS at 09:01

## 2019-01-01 RX ADMIN — BUDESONIDE 250 MCG: 0.25 SUSPENSION RESPIRATORY (INHALATION) at 20:48

## 2019-01-01 RX ADMIN — HYDRALAZINE HYDROCHLORIDE 50 MG: 50 TABLET, FILM COATED ORAL at 06:21

## 2019-01-01 RX ADMIN — FENTANYL CITRATE 50 MCG: 50 INJECTION INTRAMUSCULAR; INTRAVENOUS at 14:37

## 2019-01-01 RX ADMIN — PROPOFOL 35 MCG/KG/MIN: 10 INJECTION, EMULSION INTRAVENOUS at 00:15

## 2019-01-01 RX ADMIN — Medication 3 MG: at 23:08

## 2019-01-01 RX ADMIN — HEPARIN SODIUM 5000 UNITS: 10000 INJECTION INTRAVENOUS; SUBCUTANEOUS at 14:12

## 2019-01-01 RX ADMIN — SODIUM CHLORIDE: 9 INJECTION, SOLUTION INTRAVENOUS at 07:41

## 2019-01-01 RX ADMIN — HEPARIN SODIUM 5000 UNITS: 10000 INJECTION INTRAVENOUS; SUBCUTANEOUS at 08:38

## 2019-01-01 RX ADMIN — FENTANYL CITRATE 25 MCG: 50 INJECTION, SOLUTION INTRAMUSCULAR; INTRAVENOUS at 12:25

## 2019-01-01 RX ADMIN — METHYLPREDNISOLONE SODIUM SUCCINATE 40 MG: 40 INJECTION, POWDER, FOR SOLUTION INTRAMUSCULAR; INTRAVENOUS at 01:07

## 2019-01-01 RX ADMIN — RACEPINEPHRINE HYDROCHLORIDE 11.25 MG: 11.25 SOLUTION RESPIRATORY (INHALATION) at 14:57

## 2019-01-01 RX ADMIN — CLONAZEPAM 0.5 MG: 0.5 TABLET ORAL at 22:31

## 2019-01-01 RX ADMIN — CLOPIDOGREL 75 MG: 75 TABLET, FILM COATED ORAL at 20:25

## 2019-01-01 RX ADMIN — ACETAMINOPHEN 650 MG: 650 SUPPOSITORY RECTAL at 00:54

## 2019-01-01 RX ADMIN — INSULIN LISPRO 2 UNITS: 100 INJECTION, SOLUTION INTRAVENOUS; SUBCUTANEOUS at 11:09

## 2019-01-01 RX ADMIN — SODIUM CHLORIDE: 9 INJECTION, SOLUTION INTRAVENOUS at 19:55

## 2019-01-01 RX ADMIN — SULFAMETHOXAZOLE AND TRIMETHOPRIM 365 MG: 80; 16 INJECTION, SOLUTION, CONCENTRATE INTRAVENOUS at 20:45

## 2019-01-01 RX ADMIN — CLOPIDOGREL 75 MG: 75 TABLET, FILM COATED ORAL at 08:45

## 2019-01-01 RX ADMIN — METHYLPREDNISOLONE SODIUM SUCCINATE 40 MG: 40 INJECTION, POWDER, FOR SOLUTION INTRAMUSCULAR; INTRAVENOUS at 23:59

## 2019-01-01 RX ADMIN — IPRATROPIUM BROMIDE AND ALBUTEROL SULFATE 1 AMPULE: .5; 3 SOLUTION RESPIRATORY (INHALATION) at 09:03

## 2019-01-01 RX ADMIN — PIPERACILLIN SODIUM AND TAZOBACTAM SODIUM 3.38 G: 3; .375 INJECTION, POWDER, LYOPHILIZED, FOR SOLUTION INTRAVENOUS at 05:21

## 2019-01-01 RX ADMIN — PROPOFOL 35 MCG/KG/MIN: 10 INJECTION, EMULSION INTRAVENOUS at 12:52

## 2019-01-01 RX ADMIN — IPRATROPIUM BROMIDE AND ALBUTEROL SULFATE 1 AMPULE: .5; 3 SOLUTION RESPIRATORY (INHALATION) at 20:48

## 2019-01-01 RX ADMIN — DILTIAZEM HYDROCHLORIDE 180 MG: 180 CAPSULE, EXTENDED RELEASE ORAL at 13:12

## 2019-01-01 RX ADMIN — PIPERACILLIN SODIUM AND TAZOBACTAM SODIUM 3.38 G: 3; .375 INJECTION, POWDER, LYOPHILIZED, FOR SOLUTION INTRAVENOUS at 06:03

## 2019-01-01 RX ADMIN — IPRATROPIUM BROMIDE AND ALBUTEROL SULFATE 1 AMPULE: .5; 3 SOLUTION RESPIRATORY (INHALATION) at 09:31

## 2019-01-01 RX ADMIN — POLYETHYLENE GLYCOL 3350 17 G: 17 POWDER, FOR SOLUTION ORAL at 08:54

## 2019-01-01 RX ADMIN — Medication 10 ML: at 09:41

## 2019-01-01 RX ADMIN — Medication 10 ML: at 05:39

## 2019-01-01 RX ADMIN — CLOPIDOGREL 75 MG: 75 TABLET, FILM COATED ORAL at 09:49

## 2019-01-01 RX ADMIN — Medication 12.5 MCG/HR: at 22:50

## 2019-01-01 RX ADMIN — MOMETASONE FUROATE AND FORMOTEROL FUMARATE DIHYDRATE 2 PUFF: 100; 5 AEROSOL RESPIRATORY (INHALATION) at 22:57

## 2019-01-01 RX ADMIN — FUROSEMIDE 20 MG: 10 INJECTION, SOLUTION INTRAMUSCULAR; INTRAVENOUS at 16:47

## 2019-01-01 RX ADMIN — CLOPIDOGREL 75 MG: 75 TABLET, FILM COATED ORAL at 09:03

## 2019-01-01 RX ADMIN — APIXABAN 5 MG: 5 TABLET, FILM COATED ORAL at 05:00

## 2019-01-01 RX ADMIN — HEPARIN SODIUM 5000 UNITS: 10000 INJECTION INTRAVENOUS; SUBCUTANEOUS at 21:30

## 2019-01-01 RX ADMIN — METHYLPREDNISOLONE SODIUM SUCCINATE 40 MG: 40 INJECTION, POWDER, FOR SOLUTION INTRAMUSCULAR; INTRAVENOUS at 16:55

## 2019-01-01 RX ADMIN — PIPERACILLIN SODIUM AND TAZOBACTAM SODIUM 3.38 G: 3; .375 INJECTION, POWDER, LYOPHILIZED, FOR SOLUTION INTRAVENOUS at 05:55

## 2019-01-01 RX ADMIN — LORAZEPAM 2 MG: 2 INJECTION, SOLUTION INTRAMUSCULAR; INTRAVENOUS at 10:56

## 2019-01-01 RX ADMIN — Medication 10 ML: at 21:01

## 2019-01-01 RX ADMIN — INSULIN LISPRO 1 UNITS: 100 INJECTION, SOLUTION INTRAVENOUS; SUBCUTANEOUS at 09:37

## 2019-01-01 RX ADMIN — HYDRALAZINE HYDROCHLORIDE 50 MG: 25 TABLET, FILM COATED ORAL at 06:22

## 2019-01-01 RX ADMIN — CEFAZOLIN SODIUM 2 G: 2 SOLUTION INTRAVENOUS at 12:15

## 2019-01-01 RX ADMIN — ROFLUMILAST 500 MCG: 500 TABLET ORAL at 09:53

## 2019-01-01 RX ADMIN — PANTOPRAZOLE SODIUM 40 MG: 40 TABLET, DELAYED RELEASE ORAL at 08:45

## 2019-01-01 RX ADMIN — PROPOFOL 30 MCG/KG/MIN: 10 INJECTION, EMULSION INTRAVENOUS at 20:17

## 2019-01-01 RX ADMIN — IPRATROPIUM BROMIDE AND ALBUTEROL SULFATE 1 AMPULE: .5; 3 SOLUTION RESPIRATORY (INHALATION) at 15:57

## 2019-01-01 RX ADMIN — HEPARIN SODIUM 5000 UNITS: 10000 INJECTION INTRAVENOUS; SUBCUTANEOUS at 13:41

## 2019-01-01 RX ADMIN — Medication 10 ML: at 20:49

## 2019-01-01 RX ADMIN — Medication 3 MG: at 21:09

## 2019-01-01 RX ADMIN — BUDESONIDE 250 MCG: 0.25 SUSPENSION RESPIRATORY (INHALATION) at 06:19

## 2019-01-01 RX ADMIN — METHYLPREDNISOLONE SODIUM SUCCINATE 40 MG: 40 INJECTION, POWDER, FOR SOLUTION INTRAMUSCULAR; INTRAVENOUS at 06:16

## 2019-01-01 RX ADMIN — PIPERACILLIN SODIUM AND TAZOBACTAM SODIUM 3.38 G: 3; .375 INJECTION, POWDER, LYOPHILIZED, FOR SOLUTION INTRAVENOUS at 14:08

## 2019-01-01 RX ADMIN — Medication 10 ML: at 09:02

## 2019-01-01 RX ADMIN — IPRATROPIUM BROMIDE AND ALBUTEROL SULFATE 1 AMPULE: .5; 3 SOLUTION RESPIRATORY (INHALATION) at 19:52

## 2019-01-01 RX ADMIN — HYDRALAZINE HYDROCHLORIDE 50 MG: 50 TABLET, FILM COATED ORAL at 15:32

## 2019-01-01 RX ADMIN — OXYCODONE HYDROCHLORIDE AND ACETAMINOPHEN 1 TABLET: 5; 325 TABLET ORAL at 12:08

## 2019-01-01 RX ADMIN — Medication 10 ML: at 09:00

## 2019-01-01 RX ADMIN — IPRATROPIUM BROMIDE AND ALBUTEROL SULFATE 1 AMPULE: .5; 3 SOLUTION RESPIRATORY (INHALATION) at 12:57

## 2019-01-01 RX ADMIN — SODIUM CHLORIDE, POTASSIUM CHLORIDE, SODIUM LACTATE AND CALCIUM CHLORIDE: 600; 310; 30; 20 INJECTION, SOLUTION INTRAVENOUS at 17:57

## 2019-01-01 RX ADMIN — DILTIAZEM HYDROCHLORIDE 180 MG: 180 CAPSULE, EXTENDED RELEASE ORAL at 20:25

## 2019-01-01 RX ADMIN — IPRATROPIUM BROMIDE AND ALBUTEROL SULFATE 1 AMPULE: .5; 3 SOLUTION RESPIRATORY (INHALATION) at 15:45

## 2019-01-01 RX ADMIN — MAGNESIUM SULFATE 2 G: 2 INJECTION INTRAVENOUS at 21:31

## 2019-01-01 RX ADMIN — OXYCODONE HYDROCHLORIDE AND ACETAMINOPHEN 1 TABLET: 5; 325 TABLET ORAL at 07:33

## 2019-01-01 RX ADMIN — BUDESONIDE 250 MCG: 0.25 SUSPENSION RESPIRATORY (INHALATION) at 09:05

## 2019-01-01 RX ADMIN — CALCIUM CARBONATE-VITAMIN D TAB 500 MG-200 UNIT 1 TABLET: 500-200 TAB at 21:30

## 2019-01-01 RX ADMIN — IPRATROPIUM BROMIDE AND ALBUTEROL SULFATE 1 AMPULE: .5; 3 SOLUTION RESPIRATORY (INHALATION) at 16:41

## 2019-01-01 RX ADMIN — HYDRALAZINE HYDROCHLORIDE 50 MG: 50 TABLET, FILM COATED ORAL at 06:08

## 2019-01-01 RX ADMIN — MORPHINE SULFATE 2 MG: 2 INJECTION, SOLUTION INTRAMUSCULAR; INTRAVENOUS at 04:44

## 2019-01-01 RX ADMIN — METHYLPREDNISOLONE SODIUM SUCCINATE 40 MG: 40 INJECTION, POWDER, FOR SOLUTION INTRAMUSCULAR; INTRAVENOUS at 17:50

## 2019-01-01 RX ADMIN — INSULIN LISPRO 1 UNITS: 100 INJECTION, SOLUTION INTRAVENOUS; SUBCUTANEOUS at 21:42

## 2019-01-01 RX ADMIN — IPRATROPIUM BROMIDE AND ALBUTEROL SULFATE 1 AMPULE: .5; 3 SOLUTION RESPIRATORY (INHALATION) at 16:04

## 2019-01-01 RX ADMIN — METOPROLOL TARTRATE 25 MG: 25 TABLET ORAL at 09:40

## 2019-01-01 RX ADMIN — NADOLOL 20 MG: 20 TABLET ORAL at 08:32

## 2019-01-01 RX ADMIN — METHYLPREDNISOLONE SODIUM SUCCINATE 40 MG: 40 INJECTION, POWDER, FOR SOLUTION INTRAMUSCULAR; INTRAVENOUS at 05:21

## 2019-01-01 RX ADMIN — MIDAZOLAM HYDROCHLORIDE 4 MG: 1 INJECTION INTRAMUSCULAR; INTRAVENOUS at 19:46

## 2019-01-01 RX ADMIN — CLONAZEPAM 0.5 MG: 0.5 TABLET ORAL at 08:45

## 2019-01-01 RX ADMIN — PANTOPRAZOLE SODIUM 40 MG: 40 TABLET, DELAYED RELEASE ORAL at 09:01

## 2019-01-01 RX ADMIN — INSULIN LISPRO 2 UNITS: 100 INJECTION, SOLUTION INTRAVENOUS; SUBCUTANEOUS at 17:24

## 2019-01-01 RX ADMIN — IPRATROPIUM BROMIDE AND ALBUTEROL SULFATE 1 AMPULE: .5; 3 SOLUTION RESPIRATORY (INHALATION) at 20:00

## 2019-01-01 RX ADMIN — INSULIN LISPRO 2 UNITS: 100 INJECTION, SOLUTION INTRAVENOUS; SUBCUTANEOUS at 18:21

## 2019-01-01 RX ADMIN — CLONAZEPAM 0.5 MG: 0.5 TABLET ORAL at 22:11

## 2019-01-01 RX ADMIN — LINAGLIPTIN 5 MG: 5 TABLET, FILM COATED ORAL at 09:01

## 2019-01-01 RX ADMIN — HYDRALAZINE HYDROCHLORIDE 50 MG: 25 TABLET, FILM COATED ORAL at 21:24

## 2019-01-01 RX ADMIN — PROPOFOL 30 MCG/KG/MIN: 10 INJECTION, EMULSION INTRAVENOUS at 13:29

## 2019-01-01 RX ADMIN — IPRATROPIUM BROMIDE AND ALBUTEROL SULFATE 1 AMPULE: .5; 3 SOLUTION RESPIRATORY (INHALATION) at 12:55

## 2019-01-01 RX ADMIN — DEXTROSE 50 % IN WATER (D50W) INTRAVENOUS SYRINGE 12.5 G: at 20:09

## 2019-01-01 RX ADMIN — PANTOPRAZOLE SODIUM 40 MG: 40 INJECTION, POWDER, FOR SOLUTION INTRAVENOUS at 09:37

## 2019-01-01 RX ADMIN — IPRATROPIUM BROMIDE AND ALBUTEROL SULFATE 1 AMPULE: .5; 3 SOLUTION RESPIRATORY (INHALATION) at 08:10

## 2019-01-01 RX ADMIN — PIPERACILLIN SODIUM AND TAZOBACTAM SODIUM 3.38 G: 3; .375 INJECTION, POWDER, LYOPHILIZED, FOR SOLUTION INTRAVENOUS at 21:55

## 2019-01-01 RX ADMIN — TETANUS TOXOID, REDUCED DIPHTHERIA TOXOID AND ACELLULAR PERTUSSIS VACCINE, ADSORBED 0.5 ML: 5; 2.5; 8; 8; 2.5 SUSPENSION INTRAMUSCULAR at 16:19

## 2019-01-01 RX ADMIN — LEVOTHYROXINE SODIUM 137 MCG: 137 TABLET ORAL at 06:27

## 2019-01-01 RX ADMIN — TIOTROPIUM BROMIDE 18 MCG: 18 CAPSULE ORAL; RESPIRATORY (INHALATION) at 22:16

## 2019-01-01 RX ADMIN — FENOFIBRATE 160 MG: 160 TABLET ORAL at 08:08

## 2019-01-01 RX ADMIN — LINAGLIPTIN 5 MG: 5 TABLET, FILM COATED ORAL at 20:26

## 2019-01-01 RX ADMIN — Medication 10 ML: at 22:34

## 2019-01-01 RX ADMIN — FENOFIBRATE 160 MG: 160 TABLET ORAL at 09:52

## 2019-01-01 RX ADMIN — IPRATROPIUM BROMIDE AND ALBUTEROL SULFATE 1 AMPULE: .5; 3 SOLUTION RESPIRATORY (INHALATION) at 15:03

## 2019-01-01 RX ADMIN — INSULIN LISPRO 1 UNITS: 100 INJECTION, SOLUTION INTRAVENOUS; SUBCUTANEOUS at 11:18

## 2019-01-01 RX ADMIN — IPRATROPIUM BROMIDE AND ALBUTEROL SULFATE 1 AMPULE: .5; 3 SOLUTION RESPIRATORY (INHALATION) at 08:44

## 2019-01-01 RX ADMIN — ATORVASTATIN CALCIUM 80 MG: 40 TABLET, FILM COATED ORAL at 20:57

## 2019-01-01 RX ADMIN — DILTIAZEM HYDROCHLORIDE 180 MG: 180 CAPSULE, COATED, EXTENDED RELEASE ORAL at 08:33

## 2019-01-01 RX ADMIN — METHYLPREDNISOLONE SODIUM SUCCINATE 40 MG: 40 INJECTION, POWDER, FOR SOLUTION INTRAMUSCULAR; INTRAVENOUS at 21:08

## 2019-01-01 RX ADMIN — FENOFIBRATE 54 MG: 54 TABLET ORAL at 20:26

## 2019-01-01 RX ADMIN — PROPOFOL 35 MCG/KG/MIN: 10 INJECTION, EMULSION INTRAVENOUS at 06:53

## 2019-01-01 RX ADMIN — SULFAMETHOXAZOLE AND TRIMETHOPRIM 365 MG: 80; 16 INJECTION, SOLUTION, CONCENTRATE INTRAVENOUS at 10:36

## 2019-01-01 RX ADMIN — POLYETHYLENE GLYCOL 3350 17 G: 17 POWDER, FOR SOLUTION ORAL at 18:02

## 2019-01-01 RX ADMIN — PIPERACILLIN SODIUM AND TAZOBACTAM SODIUM 3.38 G: 3; .375 INJECTION, POWDER, LYOPHILIZED, FOR SOLUTION INTRAVENOUS at 13:10

## 2019-01-01 RX ADMIN — IPRATROPIUM BROMIDE AND ALBUTEROL SULFATE 1 AMPULE: .5; 3 SOLUTION RESPIRATORY (INHALATION) at 15:36

## 2019-01-01 RX ADMIN — HYDRALAZINE HYDROCHLORIDE 50 MG: 25 TABLET, FILM COATED ORAL at 21:20

## 2019-01-01 RX ADMIN — ACETAMINOPHEN 650 MG: 325 TABLET ORAL at 10:17

## 2019-01-01 RX ADMIN — ALBUTEROL SULFATE 2.5 MG: 2.5 SOLUTION RESPIRATORY (INHALATION) at 09:54

## 2019-01-01 RX ADMIN — APIXABAN 5 MG: 5 TABLET, FILM COATED ORAL at 15:27

## 2019-01-01 RX ADMIN — Medication 10 ML: at 09:21

## 2019-01-01 RX ADMIN — FLUTICASONE PROPIONATE 1 PUFF: 110 AEROSOL, METERED RESPIRATORY (INHALATION) at 09:35

## 2019-01-01 RX ADMIN — FORMOTEROL FUMARATE DIHYDRATE 20 MCG: 20 SOLUTION RESPIRATORY (INHALATION) at 20:28

## 2019-01-01 RX ADMIN — CITALOPRAM 20 MG: 20 TABLET, FILM COATED ORAL at 21:20

## 2019-01-01 RX ADMIN — MORPHINE SULFATE 3 MG/HR: 10 INJECTION INTRAVENOUS at 11:11

## 2019-01-01 RX ADMIN — PANTOPRAZOLE SODIUM 40 MG: 40 TABLET, DELAYED RELEASE ORAL at 09:40

## 2019-01-01 RX ADMIN — PIPERACILLIN SODIUM AND TAZOBACTAM SODIUM 3.38 G: 3; .375 INJECTION, POWDER, LYOPHILIZED, FOR SOLUTION INTRAVENOUS at 14:51

## 2019-01-01 RX ADMIN — LIDOCAINE HYDROCHLORIDE 5 ML: 10 INJECTION, SOLUTION EPIDURAL; INFILTRATION; INTRACAUDAL; PERINEURAL at 13:34

## 2019-01-01 RX ADMIN — PANTOPRAZOLE SODIUM 8 MG/HR: 40 INJECTION, POWDER, FOR SOLUTION INTRAVENOUS at 05:55

## 2019-01-01 RX ADMIN — ASPIRIN 325 MG: 325 TABLET, COATED ORAL at 19:57

## 2019-01-01 RX ADMIN — IPRATROPIUM BROMIDE AND ALBUTEROL SULFATE 1 AMPULE: .5; 3 SOLUTION RESPIRATORY (INHALATION) at 16:47

## 2019-01-01 RX ADMIN — BUDESONIDE 250 MCG: 0.25 SUSPENSION RESPIRATORY (INHALATION) at 09:21

## 2019-01-01 RX ADMIN — PANTOPRAZOLE SODIUM 40 MG: 40 INJECTION, POWDER, FOR SOLUTION INTRAVENOUS at 09:58

## 2019-01-01 RX ADMIN — METHYLPREDNISOLONE SODIUM SUCCINATE 40 MG: 40 INJECTION, POWDER, FOR SOLUTION INTRAMUSCULAR; INTRAVENOUS at 09:42

## 2019-01-01 RX ADMIN — ATORVASTATIN CALCIUM 80 MG: 40 TABLET, FILM COATED ORAL at 22:11

## 2019-01-01 RX ADMIN — PANTOPRAZOLE SODIUM 40 MG: 40 INJECTION, POWDER, FOR SOLUTION INTRAVENOUS at 08:50

## 2019-01-01 RX ADMIN — Medication 10 ML: at 20:34

## 2019-01-01 RX ADMIN — FUROSEMIDE 20 MG: 20 TABLET ORAL at 20:26

## 2019-01-01 RX ADMIN — VANCOMYCIN HYDROCHLORIDE 1000 MG: 1 INJECTION, POWDER, LYOPHILIZED, FOR SOLUTION INTRAVENOUS at 10:27

## 2019-01-01 RX ADMIN — IPRATROPIUM BROMIDE AND ALBUTEROL SULFATE 1 AMPULE: .5; 3 SOLUTION RESPIRATORY (INHALATION) at 11:49

## 2019-01-01 RX ADMIN — FENOFIBRATE 160 MG: 160 TABLET ORAL at 09:01

## 2019-01-01 RX ADMIN — INSULIN LISPRO 2 UNITS: 100 INJECTION, SOLUTION INTRAVENOUS; SUBCUTANEOUS at 10:14

## 2019-01-01 RX ADMIN — Medication 10 ML: at 09:58

## 2019-01-01 RX ADMIN — HEPARIN SODIUM 6000 UNITS: 10000 INJECTION, SOLUTION INTRAVENOUS; SUBCUTANEOUS at 12:35

## 2019-01-01 RX ADMIN — PIPERACILLIN SODIUM AND TAZOBACTAM SODIUM 3.38 G: 3; .375 INJECTION, POWDER, LYOPHILIZED, FOR SOLUTION INTRAVENOUS at 00:29

## 2019-01-01 RX ADMIN — METHYLPREDNISOLONE SODIUM SUCCINATE 40 MG: 40 INJECTION, POWDER, FOR SOLUTION INTRAMUSCULAR; INTRAVENOUS at 05:38

## 2019-01-01 RX ADMIN — IPRATROPIUM BROMIDE AND ALBUTEROL SULFATE 1 AMPULE: .5; 3 SOLUTION RESPIRATORY (INHALATION) at 16:56

## 2019-01-01 RX ADMIN — PROPOFOL 40 MCG/KG/MIN: 10 INJECTION, EMULSION INTRAVENOUS at 02:35

## 2019-01-01 RX ADMIN — ROCURONIUM BROMIDE 100 MG: 50 INJECTION, SOLUTION INTRAVENOUS at 06:18

## 2019-01-01 RX ADMIN — DILTIAZEM HYDROCHLORIDE 180 MG: 180 CAPSULE, EXTENDED RELEASE ORAL at 18:44

## 2019-01-01 RX ADMIN — CEFAZOLIN SODIUM 2 G: 2 SOLUTION INTRAVENOUS at 12:13

## 2019-01-01 RX ADMIN — INSULIN LISPRO 2 UNITS: 100 INJECTION, SOLUTION INTRAVENOUS; SUBCUTANEOUS at 13:03

## 2019-01-01 RX ADMIN — VANCOMYCIN HYDROCHLORIDE 1000 MG: 1 INJECTION, POWDER, LYOPHILIZED, FOR SOLUTION INTRAVENOUS at 00:54

## 2019-01-01 RX ADMIN — IPRATROPIUM BROMIDE AND ALBUTEROL SULFATE 1 AMPULE: .5; 3 SOLUTION RESPIRATORY (INHALATION) at 19:59

## 2019-01-01 RX ADMIN — HEPARIN SODIUM 5000 UNITS: 10000 INJECTION INTRAVENOUS; SUBCUTANEOUS at 06:30

## 2019-01-01 RX ADMIN — FLUTICASONE PROPIONATE 1 PUFF: 110 AEROSOL, METERED RESPIRATORY (INHALATION) at 20:57

## 2019-01-01 RX ADMIN — PIPERACILLIN SODIUM AND TAZOBACTAM SODIUM 3.38 G: 3; .375 INJECTION, POWDER, LYOPHILIZED, FOR SOLUTION INTRAVENOUS at 17:17

## 2019-01-01 RX ADMIN — FORMOTEROL FUMARATE DIHYDRATE 20 MCG: 20 SOLUTION RESPIRATORY (INHALATION) at 08:29

## 2019-01-01 RX ADMIN — MORPHINE SULFATE 4 MG: 4 INJECTION, SOLUTION INTRAMUSCULAR; INTRAVENOUS at 11:04

## 2019-01-01 RX ADMIN — INSULIN LISPRO 3 UNITS: 100 INJECTION, SOLUTION INTRAVENOUS; SUBCUTANEOUS at 11:21

## 2019-01-01 RX ADMIN — IPRATROPIUM BROMIDE AND ALBUTEROL SULFATE 1 AMPULE: .5; 3 SOLUTION RESPIRATORY (INHALATION) at 17:03

## 2019-01-01 RX ADMIN — PROPOFOL 25 MCG/KG/MIN: 10 INJECTION, EMULSION INTRAVENOUS at 09:13

## 2019-01-01 RX ADMIN — Medication 1.5 G: at 10:46

## 2019-01-01 RX ADMIN — CITALOPRAM 20 MG: 20 TABLET, FILM COATED ORAL at 20:58

## 2019-01-01 RX ADMIN — MAGNESIUM HYDROXIDE 30 ML: 400 SUSPENSION ORAL at 08:54

## 2019-01-01 RX ADMIN — INSULIN LISPRO 2 UNITS: 100 INJECTION, SOLUTION INTRAVENOUS; SUBCUTANEOUS at 11:13

## 2019-01-01 RX ADMIN — PROPOFOL 35 MCG/KG/MIN: 10 INJECTION, EMULSION INTRAVENOUS at 07:23

## 2019-01-01 RX ADMIN — Medication 10 ML: at 08:47

## 2019-01-01 RX ADMIN — BUDESONIDE 250 MCG: 0.25 SUSPENSION RESPIRATORY (INHALATION) at 19:48

## 2019-01-01 RX ADMIN — MOMETASONE FUROATE AND FORMOTEROL FUMARATE DIHYDRATE 2 PUFF: 100; 5 AEROSOL RESPIRATORY (INHALATION) at 09:50

## 2019-01-01 RX ADMIN — HYDRALAZINE HYDROCHLORIDE 10 MG: 20 INJECTION INTRAMUSCULAR; INTRAVENOUS at 21:45

## 2019-01-01 RX ADMIN — IPRATROPIUM BROMIDE AND ALBUTEROL SULFATE 1 AMPULE: .5; 3 SOLUTION RESPIRATORY (INHALATION) at 22:56

## 2019-01-01 RX ADMIN — IPRATROPIUM BROMIDE AND ALBUTEROL SULFATE 1 AMPULE: .5; 3 SOLUTION RESPIRATORY (INHALATION) at 21:47

## 2019-01-01 RX ADMIN — DEXMEDETOMIDINE 0.2 MCG/KG/HR: 100 INJECTION, SOLUTION, CONCENTRATE INTRAVENOUS at 09:37

## 2019-01-01 RX ADMIN — BUDESONIDE 250 MCG: 0.25 SUSPENSION RESPIRATORY (INHALATION) at 07:53

## 2019-01-01 RX ADMIN — METHYLPREDNISOLONE SODIUM SUCCINATE 40 MG: 40 INJECTION, POWDER, FOR SOLUTION INTRAMUSCULAR; INTRAVENOUS at 05:36

## 2019-01-01 RX ADMIN — HEPARIN SODIUM 5000 UNITS: 10000 INJECTION INTRAVENOUS; SUBCUTANEOUS at 06:12

## 2019-01-01 RX ADMIN — ACETAMINOPHEN 650 MG: 325 TABLET, FILM COATED ORAL at 18:33

## 2019-01-01 RX ADMIN — INSULIN LISPRO 3 UNITS: 100 INJECTION, SOLUTION INTRAVENOUS; SUBCUTANEOUS at 14:19

## 2019-01-01 RX ADMIN — ACETAMINOPHEN 650 MG: 325 TABLET, FILM COATED ORAL at 08:08

## 2019-01-01 RX ADMIN — FORMOTEROL FUMARATE DIHYDRATE 20 MCG: 20 SOLUTION RESPIRATORY (INHALATION) at 20:26

## 2019-01-01 RX ADMIN — IPRATROPIUM BROMIDE AND ALBUTEROL SULFATE 1 AMPULE: .5; 3 SOLUTION RESPIRATORY (INHALATION) at 19:46

## 2019-01-01 RX ADMIN — CLONAZEPAM 0.5 MG: 0.5 TABLET ORAL at 09:53

## 2019-01-01 RX ADMIN — Medication 3 MG: at 22:49

## 2019-01-01 RX ADMIN — ATORVASTATIN CALCIUM 80 MG: 40 TABLET, FILM COATED ORAL at 20:21

## 2019-01-01 RX ADMIN — APIXABAN 5 MG: 5 TABLET, FILM COATED ORAL at 05:37

## 2019-01-01 RX ADMIN — HYDROCORTISONE SODIUM SUCCINATE 100 MG: 100 INJECTION, POWDER, FOR SOLUTION INTRAMUSCULAR; INTRAVENOUS at 12:13

## 2019-01-01 RX ADMIN — BUDESONIDE 250 MCG: 0.25 SUSPENSION RESPIRATORY (INHALATION) at 09:46

## 2019-01-01 RX ADMIN — Medication 25 MCG/HR: at 21:24

## 2019-01-01 RX ADMIN — CALCIUM CARBONATE-VITAMIN D TAB 500 MG-200 UNIT 1 TABLET: 500-200 TAB at 08:09

## 2019-01-01 RX ADMIN — APIXABAN 5 MG: 5 TABLET, FILM COATED ORAL at 09:03

## 2019-01-01 RX ADMIN — METOPROLOL TARTRATE 25 MG: 25 TABLET ORAL at 20:27

## 2019-01-01 RX ADMIN — AMOXICILLIN AND CLAVULANATE POTASSIUM 1 TABLET: 875; 125 TABLET, FILM COATED ORAL at 20:50

## 2019-01-01 RX ADMIN — HEPARIN 300 UNITS: 100 SYRINGE at 22:09

## 2019-01-01 RX ADMIN — FUROSEMIDE 20 MG: 20 TABLET ORAL at 17:44

## 2019-01-01 RX ADMIN — Medication 10 ML: at 16:29

## 2019-01-01 RX ADMIN — PROPOFOL 30 MCG/KG/MIN: 10 INJECTION, EMULSION INTRAVENOUS at 03:12

## 2019-01-01 RX ADMIN — Medication 10 ML: at 22:11

## 2019-01-01 RX ADMIN — LEVOTHYROXINE SODIUM 137 MCG: 137 TABLET ORAL at 05:37

## 2019-01-01 RX ADMIN — FENOFIBRATE 160 MG: 160 TABLET ORAL at 08:45

## 2019-01-01 RX ADMIN — ATORVASTATIN CALCIUM 80 MG: 40 TABLET, FILM COATED ORAL at 20:23

## 2019-01-01 RX ADMIN — LEVOTHYROXINE SODIUM 137 MCG: 137 TABLET ORAL at 06:34

## 2019-01-01 RX ADMIN — HYDROCODONE BITARTRATE AND ACETAMINOPHEN 1 TABLET: 5; 325 TABLET ORAL at 16:33

## 2019-01-01 RX ADMIN — Medication 3 MG: at 21:24

## 2019-01-01 RX ADMIN — Medication 25 MCG/HR: at 12:52

## 2019-01-01 RX ADMIN — PIPERACILLIN SODIUM AND TAZOBACTAM SODIUM 3.38 G: 3; .375 INJECTION, POWDER, LYOPHILIZED, FOR SOLUTION INTRAVENOUS at 14:26

## 2019-01-01 RX ADMIN — CLONAZEPAM 0.5 MG: 0.5 TABLET ORAL at 22:59

## 2019-01-01 ASSESSMENT — PULMONARY FUNCTION TESTS
PIF_VALUE: 32
PIF_VALUE: 0
PIF_VALUE: 30
PIF_VALUE: 28
PIF_VALUE: 0
PIF_VALUE: 33
PIF_VALUE: 31
PIF_VALUE: 32
PIF_VALUE: 31
PIF_VALUE: 33
PIF_VALUE: 25
PIF_VALUE: 28
PIF_VALUE: 33
PIF_VALUE: 0
PIF_VALUE: 30
PIF_VALUE: 30
PIF_VALUE: 32
PIF_VALUE: 27
PIF_VALUE: 0
PIF_VALUE: 0
PIF_VALUE: 34
PIF_VALUE: 31
PIF_VALUE: 28
PIF_VALUE: 32
PIF_VALUE: 16
PIF_VALUE: 34
PIF_VALUE: 0
PIF_VALUE: 25
PIF_VALUE: 0
PEFR_L/MIN: 60
PIF_VALUE: 31
PIF_VALUE: 34
PIF_VALUE: 16
PIF_VALUE: 29
PIF_VALUE: 0
PIF_VALUE: 35
PIF_VALUE: 29
PIF_VALUE: 0
PIF_VALUE: 28
PIF_VALUE: 32
PIF_VALUE: 31
PIF_VALUE: 0
PIF_VALUE: 7
PIF_VALUE: 0
PIF_VALUE: 29
PIF_VALUE: 29
PIF_VALUE: 28
PIF_VALUE: 34
PIF_VALUE: 23
PIF_VALUE: 20
PIF_VALUE: 50
PIF_VALUE: 33
PIF_VALUE: 31
PIF_VALUE: 0
PIF_VALUE: 30
PIF_VALUE: 34
PIF_VALUE: 0
PIF_VALUE: 30
PIF_VALUE: 35
PIF_VALUE: 28
PIF_VALUE: 0
PIF_VALUE: 14
PIF_VALUE: 0
PIF_VALUE: 30
PIF_VALUE: 27
PIF_VALUE: 23
PIF_VALUE: 32
PIF_VALUE: 30
PIF_VALUE: 27
PIF_VALUE: 33
PIF_VALUE: 0
PIF_VALUE: 33
PIF_VALUE: 0
PIF_VALUE: 0
PIF_VALUE: 31
PIF_VALUE: 31
PIF_VALUE: 32
PIF_VALUE: 26
PIF_VALUE: 31
PIF_VALUE: 0
PIF_VALUE: 30
PIF_VALUE: 31
PIF_VALUE: 25
PIF_VALUE: 30
PEFR_L/MIN: 60
PIF_VALUE: 23
PIF_VALUE: 30
PIF_VALUE: 18
PIF_VALUE: 31
PIF_VALUE: 30
PIF_VALUE: 0
PIF_VALUE: 32
PIF_VALUE: 31
PIF_VALUE: 0
PIF_VALUE: 32
PIF_VALUE: 33
PIF_VALUE: 28
PIF_VALUE: 26
PIF_VALUE: 7
PIF_VALUE: 0
PIF_VALUE: 27
PIF_VALUE: 25
PIF_VALUE: 31
PIF_VALUE: 0
PIF_VALUE: 34
PIF_VALUE: 27
PIF_VALUE: 33
PIF_VALUE: 30
PIF_VALUE: 28
PIF_VALUE: 33
PIF_VALUE: 34
PIF_VALUE: 27
PIF_VALUE: 29
PIF_VALUE: 0
PIF_VALUE: 28
PIF_VALUE: 25
PIF_VALUE: 0
PIF_VALUE: 19
PIF_VALUE: 28
PIF_VALUE: 0
PIF_VALUE: 20
PIF_VALUE: 28
PIF_VALUE: 32
PIF_VALUE: 26
PIF_VALUE: 36
PIF_VALUE: 35
PIF_VALUE: 31
PIF_VALUE: 50
PIF_VALUE: 16
PIF_VALUE: 0
PIF_VALUE: 26
PIF_VALUE: 34
PIF_VALUE: 27
PIF_VALUE: 32
PIF_VALUE: 30
PIF_VALUE: 29
PIF_VALUE: 31
PIF_VALUE: 0
PIF_VALUE: 31
PIF_VALUE: 29
PIF_VALUE: 18
PIF_VALUE: 24
PIF_VALUE: 33
PIF_VALUE: 50
PIF_VALUE: 26
PIF_VALUE: 0
PIF_VALUE: 29
PIF_VALUE: 14
PIF_VALUE: 27
PIF_VALUE: 31
PIF_VALUE: 0
PIF_VALUE: 38
PIF_VALUE: 27
PIF_VALUE: 30
PIF_VALUE: 28
PIF_VALUE: 28
PIF_VALUE: 25
PIF_VALUE: 31
PIF_VALUE: 0
PIF_VALUE: 33
PIF_VALUE: 31
PIF_VALUE: 0
PIF_VALUE: 34
PIF_VALUE: 0
PIF_VALUE: 34
PIF_VALUE: 28
PIF_VALUE: 28
PIF_VALUE: 32
PIF_VALUE: 30
PIF_VALUE: 34
PIF_VALUE: 29
PIF_VALUE: 31
PIF_VALUE: 0
PIF_VALUE: 35
PIF_VALUE: 0
PIF_VALUE: 0
PIF_VALUE: 16
PIF_VALUE: 0
PIF_VALUE: 30
PIF_VALUE: 28
PIF_VALUE: 0
PIF_VALUE: 31
PIF_VALUE: 0
PIF_VALUE: 27
PIF_VALUE: 27
PIF_VALUE: 7
PIF_VALUE: 27
PIF_VALUE: 31
PIF_VALUE: 14
PIF_VALUE: 0
PIF_VALUE: 30
PEFR_L/MIN: 65
PIF_VALUE: 42
PIF_VALUE: 35
PIF_VALUE: 0
PIF_VALUE: 25
PIF_VALUE: 24
PEFR_L/MIN: 65
PIF_VALUE: 18
PIF_VALUE: 0
PIF_VALUE: 26
PIF_VALUE: 31
PEFR_L/MIN: 60
PIF_VALUE: 18
PIF_VALUE: 24
PIF_VALUE: 26
PIF_VALUE: 30
PIF_VALUE: 27
PIF_VALUE: 35
PIF_VALUE: 30
PIF_VALUE: 25
PIF_VALUE: 0
PIF_VALUE: 32
PIF_VALUE: 27
PIF_VALUE: 0
PIF_VALUE: 29
PIF_VALUE: 0
PIF_VALUE: 36
PIF_VALUE: 0
PIF_VALUE: 32
PIF_VALUE: 0
PIF_VALUE: 30
PIF_VALUE: 0
PIF_VALUE: 21
PIF_VALUE: 0
PIF_VALUE: 26
PIF_VALUE: 27
PIF_VALUE: 26
PIF_VALUE: 0
PIF_VALUE: 33
PIF_VALUE: 0
PIF_VALUE: 32
PIF_VALUE: 27
PIF_VALUE: 32
PIF_VALUE: 27
PIF_VALUE: 37
PIF_VALUE: 0
PIF_VALUE: 50
PIF_VALUE: 0
PIF_VALUE: 28
PIF_VALUE: 33
PIF_VALUE: 0
PIF_VALUE: 29
PIF_VALUE: 28
PIF_VALUE: 0
PIF_VALUE: 22
PIF_VALUE: 31
PIF_VALUE: 26
PIF_VALUE: 0
PIF_VALUE: 27
PIF_VALUE: 25
PIF_VALUE: 28
PIF_VALUE: 27
PIF_VALUE: 29
PIF_VALUE: 32
PIF_VALUE: 35
PIF_VALUE: 31
PIF_VALUE: 0
PIF_VALUE: 23
PIF_VALUE: 30
PIF_VALUE: 33
PIF_VALUE: 0
PIF_VALUE: 30
PIF_VALUE: 27
PIF_VALUE: 27
PIF_VALUE: 0
PIF_VALUE: 27
PIF_VALUE: 32
PIF_VALUE: 34
PIF_VALUE: 0
PIF_VALUE: 32
PIF_VALUE: 0
PIF_VALUE: 31
PIF_VALUE: 31
PIF_VALUE: 0
PIF_VALUE: 31
PIF_VALUE: 32
PIF_VALUE: 0
PIF_VALUE: 24
PIF_VALUE: 28
PIF_VALUE: 0
PIF_VALUE: 28
PIF_VALUE: 0
PIF_VALUE: 60
PIF_VALUE: 28
PIF_VALUE: 28
PIF_VALUE: 23
PIF_VALUE: 31
PIF_VALUE: 0
PIF_VALUE: 0
PIF_VALUE: 31
PIF_VALUE: 36
PIF_VALUE: 0
PIF_VALUE: 0
PIF_VALUE: 36
PIF_VALUE: 31
PIF_VALUE: 0
PIF_VALUE: 34
PIF_VALUE: 32
PIF_VALUE: 28
PIF_VALUE: 16
PIF_VALUE: 27
PIF_VALUE: 0
PIF_VALUE: 0
PIF_VALUE: 32
PIF_VALUE: 26
PIF_VALUE: 26
PIF_VALUE: 0
PIF_VALUE: 33
PIF_VALUE: 0
PIF_VALUE: 16
PIF_VALUE: 0
PIF_VALUE: 23
PIF_VALUE: 33
PIF_VALUE: 31
PIF_VALUE: 28
PIF_VALUE: 22
PIF_VALUE: 32
PIF_VALUE: 26
PIF_VALUE: 7
PIF_VALUE: 35
PIF_VALUE: 0
PIF_VALUE: 27
PIF_VALUE: 0
PIF_VALUE: 22
PIF_VALUE: 30
PIF_VALUE: 24
PIF_VALUE: 0
PIF_VALUE: 28
PIF_VALUE: 0
PIF_VALUE: 28
PIF_VALUE: 0
PIF_VALUE: 33
PIF_VALUE: 30
PIF_VALUE: 30
PIF_VALUE: 29
PIF_VALUE: 23
PIF_VALUE: 16
PIF_VALUE: 32
PIF_VALUE: 15
PIF_VALUE: 0
PIF_VALUE: 28
PIF_VALUE: 31
PIF_VALUE: 26
PIF_VALUE: 28
PIF_VALUE: 31
PIF_VALUE: 33
PIF_VALUE: 29
PIF_VALUE: 0
PIF_VALUE: 28
PIF_VALUE: 31
PIF_VALUE: 0
PIF_VALUE: 31
PIF_VALUE: 38
PIF_VALUE: 31
PIF_VALUE: 0
PIF_VALUE: 0
PIF_VALUE: 29
PIF_VALUE: 0
PIF_VALUE: 35
PIF_VALUE: 38
PIF_VALUE: 16
PIF_VALUE: 28
PIF_VALUE: 0
PIF_VALUE: 37
PIF_VALUE: 0
PIF_VALUE: 25
PIF_VALUE: 30
PIF_VALUE: 0
PIF_VALUE: 17
PIF_VALUE: 37.9
PIF_VALUE: 0
PIF_VALUE: 29
PIF_VALUE: 0
PIF_VALUE: 0
PIF_VALUE: 28
PIF_VALUE: 29
PIF_VALUE: 29
PIF_VALUE: 36
PIF_VALUE: 29
PIF_VALUE: 25
PIF_VALUE: 0
PIF_VALUE: 18
PIF_VALUE: 28
PIF_VALUE: 26
PIF_VALUE: 33
PIF_VALUE: 32
PIF_VALUE: 32
PIF_VALUE: 0
PIF_VALUE: 28
PIF_VALUE: 31
PIF_VALUE: 28
PIF_VALUE: 30
PIF_VALUE: 24
PIF_VALUE: 29
PIF_VALUE: 33
PIF_VALUE: 0
PIF_VALUE: 27
PIF_VALUE: 30
PIF_VALUE: 28
PIF_VALUE: 33
PIF_VALUE: 29
PIF_VALUE: 30
PIF_VALUE: 27
PIF_VALUE: 26
PIF_VALUE: 30
PIF_VALUE: 37
PIF_VALUE: 0
PIF_VALUE: 29
PIF_VALUE: 27
PIF_VALUE: 27
PIF_VALUE: 38
PIF_VALUE: 7
PIF_VALUE: 0

## 2019-01-01 ASSESSMENT — PAIN SCALES - GENERAL
PAINLEVEL_OUTOF10: 0
PAINLEVEL_OUTOF10: 0
PAINLEVEL_OUTOF10: 8
PAINLEVEL_OUTOF10: 4
PAINLEVEL_OUTOF10: 10
PAINLEVEL_OUTOF10: 0
PAINLEVEL_OUTOF10: 0
PAINLEVEL_OUTOF10: 7
PAINLEVEL_OUTOF10: 7
PAINLEVEL_OUTOF10: 0
PAINLEVEL_OUTOF10: 7
PAINLEVEL_OUTOF10: 0
PAINLEVEL_OUTOF10: 7
PAINLEVEL_OUTOF10: 3
PAINLEVEL_OUTOF10: 0
PAINLEVEL_OUTOF10: 2
PAINLEVEL_OUTOF10: 5
PAINLEVEL_OUTOF10: 2
PAINLEVEL_OUTOF10: 0
PAINLEVEL_OUTOF10: 9
PAINLEVEL_OUTOF10: 1
PAINLEVEL_OUTOF10: 0
PAINLEVEL_OUTOF10: 3
PAINLEVEL_OUTOF10: 0
PAINLEVEL_OUTOF10: 8
PAINLEVEL_OUTOF10: 0
PAINLEVEL_OUTOF10: 0
PAINLEVEL_OUTOF10: 2
PAINLEVEL_OUTOF10: 0
PAINLEVEL_OUTOF10: 8
PAINLEVEL_OUTOF10: 0
PAINLEVEL_OUTOF10: 0
PAINLEVEL_OUTOF10: 5
PAINLEVEL_OUTOF10: 0
PAINLEVEL_OUTOF10: 8
PAINLEVEL_OUTOF10: 0
PAINLEVEL_OUTOF10: 0
PAINLEVEL_OUTOF10: 1
PAINLEVEL_OUTOF10: 10
PAINLEVEL_OUTOF10: 0
PAINLEVEL_OUTOF10: 8
PAINLEVEL_OUTOF10: 0
PAINLEVEL_OUTOF10: 4
PAINLEVEL_OUTOF10: 0
PAINLEVEL_OUTOF10: 9
PAINLEVEL_OUTOF10: 0
PAINLEVEL_OUTOF10: 10
PAINLEVEL_OUTOF10: 0
PAINLEVEL_OUTOF10: 6
PAINLEVEL_OUTOF10: 0
PAINLEVEL_OUTOF10: 3
PAINLEVEL_OUTOF10: 0
PAINLEVEL_OUTOF10: 2
PAINLEVEL_OUTOF10: 0
PAINLEVEL_OUTOF10: 8
PAINLEVEL_OUTOF10: 0
PAINLEVEL_OUTOF10: 8
PAINLEVEL_OUTOF10: 4
PAINLEVEL_OUTOF10: 8
PAINLEVEL_OUTOF10: 0
PAINLEVEL_OUTOF10: 7
PAINLEVEL_OUTOF10: 0
PAINLEVEL_OUTOF10: 8
PAINLEVEL_OUTOF10: 8
PAINLEVEL_OUTOF10: 0
PAINLEVEL_OUTOF10: 2
PAINLEVEL_OUTOF10: 10
PAINLEVEL_OUTOF10: 0
PAINLEVEL_OUTOF10: 0
PAINLEVEL_OUTOF10: 3
PAINLEVEL_OUTOF10: 0

## 2019-01-01 ASSESSMENT — PAIN DESCRIPTION - PAIN TYPE
TYPE: CHRONIC PAIN
TYPE: ACUTE PAIN
TYPE: CHRONIC PAIN
TYPE: ACUTE PAIN
TYPE: CHRONIC PAIN
TYPE: ACUTE PAIN
TYPE: CHRONIC PAIN
TYPE: ACUTE PAIN
TYPE: CHRONIC PAIN
TYPE: ACUTE PAIN

## 2019-01-01 ASSESSMENT — PAIN DESCRIPTION - PROGRESSION
CLINICAL_PROGRESSION: GRADUALLY WORSENING
CLINICAL_PROGRESSION: NOT CHANGED
CLINICAL_PROGRESSION: GRADUALLY IMPROVING
CLINICAL_PROGRESSION: NOT CHANGED
CLINICAL_PROGRESSION: GRADUALLY WORSENING
CLINICAL_PROGRESSION: NOT CHANGED
CLINICAL_PROGRESSION: NOT CHANGED
CLINICAL_PROGRESSION: GRADUALLY IMPROVING
CLINICAL_PROGRESSION: GRADUALLY WORSENING
CLINICAL_PROGRESSION: NOT CHANGED
CLINICAL_PROGRESSION: RAPIDLY WORSENING

## 2019-01-01 ASSESSMENT — PAIN DESCRIPTION - ONSET
ONSET: ON-GOING
ONSET: SUDDEN
ONSET: GRADUAL

## 2019-01-01 ASSESSMENT — ENCOUNTER SYMPTOMS
EYE DISCHARGE: 0
ABDOMINAL DISTENTION: 0
ABDOMINAL PAIN: 0
BACK PAIN: 0
WHEEZING: 0
NAUSEA: 0
EYE REDNESS: 0
COUGH: 0
SORE THROAT: 0
EYE PAIN: 0
SHORTNESS OF BREATH: 1
DIARRHEA: 0
SHORTNESS OF BREATH: 0
VOMITING: 0
SINUS PRESSURE: 0

## 2019-01-01 ASSESSMENT — PAIN DESCRIPTION - ORIENTATION
ORIENTATION: RIGHT
ORIENTATION: RIGHT
ORIENTATION: MID;LOWER
ORIENTATION: LEFT
ORIENTATION: RIGHT
ORIENTATION: LEFT;LOWER
ORIENTATION: MID;LOWER
ORIENTATION: LEFT
ORIENTATION: RIGHT
ORIENTATION: LEFT

## 2019-01-01 ASSESSMENT — PAIN - FUNCTIONAL ASSESSMENT
PAIN_FUNCTIONAL_ASSESSMENT: ACTIVITIES ARE NOT PREVENTED

## 2019-01-01 ASSESSMENT — PAIN DESCRIPTION - LOCATION
LOCATION: HEAD
LOCATION: ABDOMEN
LOCATION: ABDOMEN
LOCATION: LEG
LOCATION: BACK
LOCATION: ANKLE
LOCATION: BACK
LOCATION: ANKLE
LOCATION: NECK
LOCATION: ANKLE
LOCATION: ABDOMEN
LOCATION: ABDOMEN
LOCATION: BACK
LOCATION: ABDOMEN

## 2019-01-01 ASSESSMENT — PAIN DESCRIPTION - FREQUENCY
FREQUENCY: CONTINUOUS
FREQUENCY: INTERMITTENT
FREQUENCY: INTERMITTENT

## 2019-01-01 ASSESSMENT — PAIN DESCRIPTION - DESCRIPTORS
DESCRIPTORS: ACHING;DISCOMFORT
DESCRIPTORS: HEADACHE
DESCRIPTORS: ACHING;DISCOMFORT
DESCRIPTORS: SORE
DESCRIPTORS: CONSTANT;DISCOMFORT
DESCRIPTORS: SHARP
DESCRIPTORS: NAGGING;ACHING;SORE
DESCRIPTORS: ACHING;DISCOMFORT
DESCRIPTORS: CONSTANT;DISCOMFORT
DESCRIPTORS: ACHING;DISCOMFORT;DULL
DESCRIPTORS: ACHING;DISCOMFORT

## 2019-01-01 ASSESSMENT — COPD QUESTIONNAIRES: CAT_SEVERITY: SEVERE

## 2019-01-01 ASSESSMENT — PAIN DESCRIPTION - DIRECTION
RADIATING_TOWARDS: FLANK
RADIATING_TOWARDS: LEFT LOWER
RADIATING_TOWARDS: GROIN

## 2019-01-01 ASSESSMENT — LIFESTYLE VARIABLES: SMOKING_STATUS: 1

## 2019-03-06 PROBLEM — L97.212 CALF ULCER, RIGHT, WITH FAT LAYER EXPOSED (HCC): Chronic | Status: ACTIVE | Noted: 2019-01-01

## 2019-03-06 PROBLEM — I70.25 ATHEROSCLEROSIS OF NATIVE ARTERY OF EXTREMITY WITH ULCERATION (HCC): Chronic | Status: ACTIVE | Noted: 2019-01-01

## 2019-03-06 PROBLEM — L97.212 DIABETIC ULCER OF RIGHT CALF ASSOCIATED WITH TYPE 2 DIABETES MELLITUS, WITH FAT LAYER EXPOSED (HCC): Chronic | Status: ACTIVE | Noted: 2019-01-01

## 2019-03-06 PROBLEM — E11.622 DIABETIC ULCER OF RIGHT CALF ASSOCIATED WITH TYPE 2 DIABETES MELLITUS, WITH FAT LAYER EXPOSED (HCC): Chronic | Status: ACTIVE | Noted: 2019-01-01

## 2019-03-15 PROBLEM — K43.6 STRANGULATED VENTRAL HERNIA: Status: ACTIVE | Noted: 2018-07-14

## 2019-03-15 PROBLEM — K43.2 VENTRAL HERNIA, RECURRENT: Status: ACTIVE | Noted: 2019-01-01

## 2019-03-16 PROBLEM — E44.1 MILD PROTEIN-CALORIE MALNUTRITION (HCC): Status: ACTIVE | Noted: 2019-01-01

## 2019-03-20 PROBLEM — L97.212 CALF ULCER, RIGHT, WITH FAT LAYER EXPOSED (HCC): Chronic | Status: RESOLVED | Noted: 2019-01-01 | Resolved: 2019-01-01

## 2019-03-25 PROBLEM — I73.9 PVD (PERIPHERAL VASCULAR DISEASE) (HCC): Status: ACTIVE | Noted: 2019-01-01

## 2019-04-01 NOTE — TELEPHONE ENCOUNTER
Pt calling into office today requesting meds for chest congestion/\"pain in lungs\" that started last week. Pt using her oxygen per orders but isn't feeling like it is helping. Using albuterol inhaler as needed with relief. Pt to have Aortogram tomorrow and not wanting to do it since she is not feeling well. Advised pt to contact ordering physician (@wound care ) to reschedule. New orders for antibiotic and steroid meds sent to pharmacy per Dr. Mike Hussein's orders. Instructed pt to contact office for appointment if symptoms do not subside with current orders. Pt has follow up appt and testing to be done next month.

## 2019-04-10 NOTE — PROGRESS NOTES
Wound Healing Center Followup Visit Note    Referring Physician : [facility] RECORD NUMBER:  [id number]  AGE: 75 y.o. GENDER: female  : 1945  EPISODE DATE:  4/10/2019    Subjective:     Chief Complaint   Patient presents with    Wound Check     right ankle      HISTORY of PRESENT ILLNESS HPI   Dominik Green is a 75 y.o. female who presents today in regards to follow up evaluation and treatment of wound/ulcer. That patient's past medical, family and social hx were reviewed and changes were made if present. History of Wound Context:  The patient has had a wound of the right calf since 19 which was first noted approximately right calf.  This has been treated dressing changes.  On their initial visit to the wound healing center, 19, the patient has noted that the wound has not been improving.  The patient has not had similar previous wounds in the past.       Pt is currently on abx, doxycycline prescribed at ER visit.     3/6/19  · abis and pvrs ordered  · Likely will need angiogram in future due to arterial occlusive disease  · aquacell  3/20/19  · Wound stable  · Significant arterial occlusive disease noted on abis and pvrs  · R CAITLIN 0.65, TBI 0.21  · Left CAITLIN of 0.69, TBI 0.66  ? Evidence of significant right iliofemoral, fem-pop and likely tibial arterial occlusive disease  ?  Left fem-pop and tibial arterial occlusive disease  · Will schedule for angiogram  · I reviewed the procedure with the patient and family as available.  I discussed the procedure, risks, benefits, complications, and alternatives of the procedure.  They understand and consent.  All questions were answered  3/27/19  · Wound stable  · Will reschedule angiogram for 4.2  · Information written for her and grandson  4/10/19  · Angiogram rescheduled for next week     Wound/Ulcer Pain Timing/Severity: waxing and waning, mild  Quality of pain: aching, throbbing  Severity:  2 / 10   Modifying Factors: Pain worsens with pressure, debridement  Associated Signs/Symptoms: drainage and pain     Ulcer Identification:  Ulcer Type: venous, arterial, diabetic and traumatic  Contributing Factors: edema, venous stasis, diabetes, smoking, arterial insufficiency and decreased tissue oxygenation     Diabetic/Pressure/Non Pressure Ulcers only:  Ulcer: Diabetic ulcer, fat layer exposed   Wound : Contusion          PAST MEDICAL HISTORY      Diagnosis Date    Arthritis     CAD (coronary artery disease)     [facility]     Diabetes [facility] [facility]     Diabetic ulcer of right calf associated with type 2 diabetes mellitus, with fat layer exposed ([facility] [address] 3/6/2019    HTN (hypertension)     Hyperlipidemia     Osteomyelitis ([facility] [address]     Thyroid disease      Past Surgical History:   Procedure Laterality Date    CARDIAC SURGERY      CHOLECYSTECTOMY      COLONOSCOPY      CORONARY ARTERY BYPASS GRAFT N/A May 22, 2013    ENDOSCOPY, COLON, DIAGNOSTIC      HYSTERECTOMY      JOINT REPLACEMENT      bilateral knees     Family History   Problem Relation Age of Onset    Heart Attack Mother     Hypertension Mother     Heart Attack Father     Cancer Father     COPD Father     Cancer Sister     Other Brother     COPD Sister      Social History     Tobacco Use    Smoking status: Current Every Day Smoker     Packs/day: 2.00     Years: 55.00     Pack years: 110.00    Smokeless tobacco: Former User   Substance Use Topics    Alcohol use: No    Drug use: No     No Known Allergies  Current Outpatient Medications on File Prior to Encounter   Medication Sig Dispense Refill    predniSONE (DELTASONE) 10 MG tablet Take 4 tabs(40mg)daily x 3 then 3 tabs(30mg)daily x 3 then 2 tabs(20mg)daily x 3 then 1 tab (10mg)daily x 3 days. 30 tablet 0    oxyCODONE (ROXICODONE) 20 MG immediate release tablet Take 20 mg by mouth 4 times daily as needed.   0    RA ASPIRIN  MG EC tablet Take 325 mg by mouth daily 0    Roflumilast (DALIRESP) 500 MCG tablet Take 1 tablet by mouth daily 30 tablet 0    Fluticasone-Umeclidin-Vilant (TRELEGY ELLIPTA) 100-62.5-25 MCG/INH AEPB Inhale 1 puff into the lungs daily 1 each 3    albuterol sulfate HFA (PROAIR HFA) 108 (90 Base) MCG/ACT inhaler Inhale 2 puffs into the lungs every 6 hours as needed for Wheezing or Shortness of Breath /// Same as Ventolin(Blue) Inhaler 1 Inhaler 3    furosemide (LASIX) 20 MG tablet TAKE 1 TABLET BY MOUTH ONCE DAILY  1    melatonin 3 MG TABS tablet TAKE 1 TABLET BY MOUTH AT BEDTIME  4    atorvastatin (LIPITOR) 80 MG tablet Take 1 tablet by mouth nightly 30 tablet 0    diltiazem (CARDIZEM CD) 180 MG extended release capsule Take 1 capsule by mouth daily 30 capsule 0    metoprolol tartrate (LOPRESSOR) 25 MG tablet Take 25 mg by mouth daily      loratadine (CLARITIN) 10 MG tablet Take 10 mg by mouth daily      clonazePAM (KLONOPIN) 0.5 MG tablet Take 0.5 mg by mouth 2 times daily      clopidogrel (PLAVIX) 75 MG tablet Take 1 tablet by mouth daily 30 tablet 3    levothyroxine (SYNTHROID) 137 MCG tablet Take 137 mcg by mouth Daily      Cholecalciferol (VITAMIN D3) 66235 UNITS CAPS Take by mouth every 30 days      sitaGLIPtin (JANUVIA) 50 MG tablet Take 50 mg by mouth daily      fenofibrate (TRICOR) 145 MG tablet Take 145 mg by mouth daily      calcium-vitamin D (OSCAL-500) 500-200 MG-UNIT per tablet Take 1 tablet by mouth 2 times daily.  citalopram (CELEXA) 20 MG tablet Take 20 mg by mouth nightly.  pantoprazole (PROTONIX) 40 MG tablet Take 40 mg by mouth daily. No current facility-administered medications on file prior to encounter.         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 Performed by: Audra Nielsen MD    Consent obtained:  Yes    Time out taken:  Yes    Pain Control: Anesthetic  Anesthetic: 2% Lidocaine Gel Topical     Debridement:Non-excisional Debridement    Using curette the wound(s)/ulcer(s) was/were sharply debrided down through and including the removal of epidermis and dermis. Devitalized Tissue Debrided:  fibrin, biofilm, slough and exudate to stimulate bleeding to promote healing, post debridement good bleeding base and wound edges noted    Pre Debridement Measurements:  Are located in the [place]  Documentation Flow Sheet    Wound/Ulcer #: 1    Post Debridement Measurements:  Wound/Ulcer Descriptions are Pre Debridement except measurements:    Wound 03/15/19 Leg Right;Medial;Lower wound # 1 acquired 2/6/19 from trauma (Active)   Wound Image   4/10/2019 10:38 AM   Wound Other 3/15/2019  4:43 PM   Dressing Status Old drainage; Intact;Dry 4/10/2019 11:38 AM   Dressing Changed Changed/New 4/10/2019 11:38 AM   Dressing/Treatment Alginate;Dry Dressing 4/10/2019 11:38 AM   Wound Cleansed Rinsed/Irrigated with saline 4/10/2019 11:38 AM   Wound Length (cm) 5.6 cm 4/10/2019 10:38 AM   Wound Width (cm) 6.1 cm 4/10/2019 10:38 AM   Wound Depth (cm) 0.3 cm 4/10/2019 10:38 AM   Wound Surface Area (cm^2) 34.16 cm^2 4/10/2019 weeks_____________________________  (Please note your next appointment above and if you are unable to keep, kindly give a 24 hour notice. Thank you.)     Physician signature:__________________________        If you experience any of the following, please call the [email or web address] during business hours:     * Increase in Pain  * Temperature over 101  * Increase in drainage from your wound  * Drainage with a foul odor  * Bleeding  * Increase in swelling  * Need for compression bandage changes due to slippage, breakthrough drainage.     If you need medical attention outside of the business hours of the [facility] please contact your PCP or go to the nearest emergency room.         Electronically signed by Shy Ludwig MD on 4/10/2019 at 2:35 PM

## 2019-04-16 NOTE — TELEPHONE ENCOUNTER
Gracie Paez called back to reschedule angiogram.   Scheduled on 4-30-19, report to Admitting at 6:30 am, angio at 11:30 (needs pre-hydration). NPO after midnight except for heart and blood pressure medications with sips of water.

## 2019-04-30 NOTE — ANESTHESIA PRE PROCEDURE
Department of Anesthesiology  Preprocedure Note       Name:  Adarsh Cason   Age:  75 y.o.  :  1945                                          MRN:  [id number]         Date:  2019      Surgeon: PK  Procedure: ABDOMINAL AORTOGRAM    Medications prior to admission:   Prior to Admission medications    Medication Sig Start Date End Date Taking? Authorizing Provider   oxyCODONE (ROXICODONE) 20 MG immediate release tablet Take 20 mg by mouth 4 times daily as needed.  19  Yes Historical Provider, MD PORTILLO ASPIRIN  MG EC tablet Take 325 mg by mouth daily 19  Yes Historical Provider, MD   Roflumilast (DALIRESP) 500 MCG tablet Take 1 tablet by mouth daily 18  Yes Drew Brown DO   Fluticasone-Umeclidin-Vilant (TRELEGY ELLIPTA) 100-62.5-25 MCG/INH AEPB Inhale 1 puff into the lungs daily 18  Yes Hakan Bazzi MD   albuterol sulfate HFA (PROAIR HFA) 108 (90 Base) MCG/ACT inhaler Inhale 2 puffs into the lungs every 6 hours as needed for Wheezing or Shortness of Breath /// Same as Ventolin(Blue) Inhaler 18  Yes Hakan Bazzi MD   furosemide (LASIX) 20 MG tablet TAKE 1 TABLET BY MOUTH ONCE DAILY 18  Yes Historical Provider, MD   melatonin 3 MG TABS tablet TAKE 1 TABLET BY MOUTH AT BEDTIME 18  Yes Historical Provider, MD   atorvastatin (LIPITOR) 80 MG tablet Take 1 tablet by mouth nightly 18  Yes Ajit Gomez DO   diltiazem (CARDIZEM CD) 180 MG extended release capsule Take 1 capsule by mouth daily 18  Yes Ajit Gomez DO   metoprolol tartrate (LOPRESSOR) 25 MG tablet Take 25 mg by mouth daily   Yes Historical Provider, MD   loratadine (CLARITIN) 10 MG tablet Take 10 mg by mouth daily   Yes Historical Provider, MD   clonazePAM (KLONOPIN) 0.5 MG tablet Take 0.5 mg by mouth 2 times daily   Yes Historical Provider, MD   clopidogrel (PLAVIX) 75 MG tablet Take 1 tablet by mouth daily 16  Yes Sharla Ruby DO   levothyroxine (SYNTHROID) 137 MCG tablet Take 137 mcg by mouth Daily   Yes Historical Provider, MD   fenofibrate (TRICOR) 145 MG tablet Take 145 mg by mouth daily   Yes Historical Provider, MD   calcium-vitamin D (OSCAL-500) 500-200 MG-UNIT per tablet Take 1 tablet by mouth 2 times daily. Yes Historical Provider, MD   citalopram (CELEXA) 20 MG tablet Take 20 mg by mouth nightly. Yes Historical Provider, MD   pantoprazole (PROTONIX) 40 MG tablet Take 40 mg by mouth daily. Yes Historical Provider, MD   Cholecalciferol (VITAMIN D3) 41782 UNITS CAPS Take by mouth every 30 days    Historical Provider, MD   sitaGLIPtin (JANUVIA) 50 MG tablet Take 50 mg by mouth daily    Historical Provider, MD       Current medications:    Current Outpatient Medications   Medication Sig Dispense Refill    oxyCODONE (ROXICODONE) 20 MG immediate release tablet Take 20 mg by mouth 4 times daily as needed.   0    RA ASPIRIN  MG EC tablet Take 325 mg by mouth daily  0    Roflumilast (DALIRESP) 500 MCG tablet Take 1 tablet by mouth daily 30 tablet 0    Fluticasone-Umeclidin-Vilant (TRELEGY ELLIPTA) 100-62.5-25 MCG/INH AEPB Inhale 1 puff into the lungs daily 1 each 3    albuterol sulfate HFA (PROAIR HFA) 108 (90 Base) MCG/ACT inhaler Inhale 2 puffs into the lungs every 6 hours as needed for Wheezing or Shortness of Breath /// Same as Ventolin(Blue) Inhaler 1 Inhaler 3    furosemide (LASIX) 20 MG tablet TAKE 1 TABLET BY MOUTH ONCE DAILY  1    melatonin 3 MG TABS tablet TAKE 1 TABLET BY MOUTH AT BEDTIME  4    atorvastatin (LIPITOR) 80 MG tablet Take 1 tablet by mouth nightly 30 tablet 0    diltiazem (CARDIZEM CD) 180 MG extended release capsule Take 1 capsule by mouth daily 30 capsule 0    metoprolol tartrate (LOPRESSOR) 25 MG tablet Take 25 mg by mouth daily      loratadine (CLARITIN) 10 MG tablet Take 10 mg by mouth daily      clonazePAM (KLONOPIN) 0.5 MG tablet Take 0.5 mg by mouth 2 times daily      clopidogrel (PLAVIX) 75 MG tablet Take 1 tablet by mouth daily 30 tablet 3  levothyroxine (SYNTHROID) 137 MCG tablet Take 137 mcg by mouth Daily      fenofibrate (TRICOR) 145 MG tablet Take 145 mg by mouth daily      calcium-vitamin D (OSCAL-500) 500-200 MG-UNIT per tablet Take 1 tablet by mouth 2 times daily.  citalopram (CELEXA) 20 MG tablet Take 20 mg by mouth nightly.  pantoprazole (PROTONIX) 40 MG tablet Take 40 mg by mouth daily.       Cholecalciferol (VITAMIN D3) 30058 UNITS CAPS Take by mouth every 30 days      sitaGLIPtin (JANUVIA) 50 MG tablet Take 50 mg by mouth daily       Current Facility-Administered Medications   Medication Dose Route Frequency Provider Last Rate Last Dose    sodium chloride flush 0.9 % injection 10 mL  10 mL Intravenous 2 times per day Nathaniel Chaudhary MD        sodium chloride flush 0.9 % injection 10 mL  10 mL Intravenous PRN Nathaniel Chaudhary MD        ceFAZolin (ANCEF) 2 g in dextrose 3 % 50 mL IVPB (duplex)  2 g Intravenous On Call to Michael Law MD        0.9 % sodium chloride infusion   Intravenous Continuous Nathaniel Chaudhary  mL/hr at 04/30/19 8116         Allergies:  No Known Allergies    Problem List:    Patient Active Problem List   Diagnosis Code    Altered mental status R41.82    ICAO (internal carotid artery occlusion) I65.29    Recurrent incisional hernia with incarceration K43.0    Left arm weakness R29.898    Stroke-like symptoms R29.90    Acute kidney injury superimposed on chronic kidney disease (HCC) N17.9, N18.9    Type 2 diabetes mellitus with renal complication (HCC) T77.84    HTN (hypertension) I10    HLD (hyperlipidemia) E78.5    COPD (chronic obstructive pulmonary disease) ([facility] [address] J44.9    CAD (coronary artery disease) I25.10    Hypothyroidism E03.9    Tobacco dependence F17.200    Strangulated ventral hernia K43.6    History of recent fall Z91.81    Soft tissue injury T14.90XA    Acute CVA (cerebrovascular accident) ([facility] [address] I63.9    Acute respiratory failure with hypercapnia ([facility]) J96.02    Asymptomatic stenosis of left carotid artery I65.22    Respiratory failure ([facility]) J96.90    Acute on chronic respiratory failure with hypoxia and hypercapnia ([facility]) J96.21, J96.22    Atherosclerosis of native artery of extremity with ulceration ([facility] [address] I70.25    Diabetic ulcer of right calf associated with type 2 diabetes mellitus, with fat layer exposed ([facility] [address] E11.622, S07.356    Ventral hernia, recurrent K43.2    Mild protein-calorie malnutrition ([facility]) E44.1    PVD (peripheral vascular disease) ([facility]) I73.9       Past Medical History:        Diagnosis Date    Arthritis     CAD (coronary artery disease)     CHF (congestive heart failure) ([facility])     COPD (chronic obstructive pulmonary disease) ([facility]     Diabetes ([facility] [address]     Diabetic ulcer of right calf associated with type 2 diabetes mellitus, with fat layer exposed ([facility] [address] 3/6/2019    HTN (hypertension)     Hyperlipidemia     Osteomyelitis ([facility] [address]     Thyroid disease        Past Surgical History:        Procedure Laterality Date    ABDOMEN SURGERY      CARDIAC SURGERY      CHOLECYSTECTOMY      COLONOSCOPY      CORONARY ARTERY BYPASS GRAFT N/A May 22, 2013    ENDOSCOPY, COLON, DIAGNOSTIC      HYSTERECTOMY      JOINT REPLACEMENT      bilateral knees       Social History:    Social History     Tobacco Use    Smoking status: Current Every Day Smoker     Packs/day: 2.00     Years: 55.00     Pack years: 110.00    Smokeless tobacco: Former User   Substance Use Topics    Alcohol use:  No                                Ready to quit: Not Answered  Counseling given: Not Answered      Vital Signs (Current):   Vitals:    04/30/19 0731   BP: (!) 220/118   Pulse: 86   Resp: 16   Temp: 98.3 °F (36.8 °C)   TempSrc: Temporal   Weight: 145 lb (65.8 kg)   Height: 5' 1\" (1.549 m)                                              BP Readings from Last 3 Encounters:   04/30/19 (!) 220/118   04/10/19 (!) 140/80   03/27/19 (!) 158/80 NPO Status:  before midnight                                                                               BMI:   Wt Readings from Last 3 Encounters:   04/30/19 145 lb (65.8 kg)   04/10/19 140 lb (63.5 kg)   03/27/19 140 lb (63.5 kg)     Body mass index is 27.4 kg/m². CBC:   Lab Results   Component Value Date    WBC 5.3 04/30/2019    RBC 4.08 04/30/2019    HGB 12.1 04/30/2019    HCT 38.9 04/30/2019    MCV 95.3 04/30/2019    RDW 15.5 04/30/2019     04/30/2019       CMP:   Lab Results   Component Value Date     04/30/2019    K 4.2 04/30/2019     04/30/2019    CO2 31 04/30/2019    BUN 23 04/30/2019    CREATININE 1.3 04/30/2019    GFRAA 48 04/30/2019    LABGLOM 40 04/30/2019    GLUCOSE 111 04/30/2019    GLUCOSE 123 05/11/2012    PROT 6.2 03/17/2019    CALCIUM 10.1 04/30/2019    BILITOT 0.7 03/17/2019    ALKPHOS 75 03/17/2019    AST 21 03/17/2019    ALT 15 03/17/2019       POC Tests: No results for input(s): POCGLU, POCNA, POCK, POCCL, POCBUN, POCHEMO, POCHCT in the last 72 hours. Coags:   Lab Results   Component Value Date    PROTIME 12.0 04/30/2019    INR 1.0 04/30/2019    APTT 30.2 03/15/2019       HCG (If Applicable): No results found for: PREGTESTUR, PREGSERUM, HCG, HCGQUANT     ABGs:   Lab Results   Component Value Date    PO2ART 118.7 12/15/2018    QWO0LIQ 59.2 12/15/2018    BVL2SWT 28.7 12/15/2018        Type & Screen (If Applicable):  No results found for: LABABO, [address]    Anesthesia Evaluation  Patient summary reviewed and Nursing notes reviewed  Airway: Mallampati: II  TM distance: >3 FB   Neck ROM: full  Mouth opening: > = 3 FB Dental: normal exam         Pulmonary:   (+) COPD: severe,  shortness of breath:  rhonchi,  decreased breath sounds,  current smoker          Patient smoked on day of surgery. ROS comment: Home O2 2liters continuous. Continues to smoke.   BIPAP on last admission to hospital  Chronic productive cough   Cardiovascular:  Exercise tolerance: poor (<4 METS),   (+) hypertension:, CAD:, CABG/stent:, CHF:, REYES:,     (-)  angina      Rhythm: regular  Rate: normal                 ROS comment: CABG 5 years ago. No recurrent chest pain. Neuro/Psych:                ROS comment: Right sided weakness transient within last year. Hospitalized. No recurrence    Chronic back pain GI/Hepatic/Renal: Neg GI/Hepatic/Renal ROS            Endo/Other:    (+) DiabetesType II DM, well controlled, , hypothyroidism::., .                 Abdominal:           Vascular:                                        Anesthesia Plan      MAC     ASA 4       Induction: intravenous. Anesthetic plan and risks discussed with patient. Plan discussed with CRNA. DOS STAFF ADDENDUM:    Patient seen and chart reviewed. Physical exam and history updated as indicated. NPO status confirmed. Anesthesia options and plan discussed including risks benefits with patient/legal guardian and family as available. Concerns and questions addressed. Consent verbalized to proceed. Anesthesia plan, options and intraoperative/postoperative concerns discussed with care team.  Steroid for lung disease last month.     Laxmi Moraes MD  4/30/2019  11:44 AM        Laxmi Moraes MD   4/30/2019

## 2019-04-30 NOTE — PROGRESS NOTES
1440 arrived to CSU with Ami Sifuentes RN, [facility] site with hematoma present post transfer to bed. Manual compression held to LFA site, normal saline administered via pressure bag and vitals monitored every three minutes, DR. Balaji Gallagher paged to bedside. **1445 Dr KRAFT holding manual compression, ultrasound notified of STAT via CSU and Dr. Angi Vargas, continuing to monitor vitals [id number]. Left DP 3+.  ** 1516 Ultrasound arrived. 1520 Dr. Celestino Pacheco called to bedside to assess patient site. Please see physician notes for further results. Manual compression continued.

## 2019-04-30 NOTE — OP NOTE
7 fr sheath to right common femoral artery. Pressure measurements were done during this process. The profunda lesion was treated with plasty predilation with 6x80 and post with 7x80 DCB impact. The right external iliac artery disease was treated with 7x38 ICAST stent. The left external iliac artery disease was treated with 8x38 ICAST stent. Completion angiogram noted much improved filling distally and brisk flow though the stenotic area.   A short  7 fr sheath was exchanged bilaterally and after femoral angiogram a 7 fr exoseal device used to close the Left common femoral artery     Pre sbp Post SBP   Aorta 190 180   R common femoral 160 160   R profunda distal 120 160   L external iliac 120 186     Postop Exam  Right DP monophasic  PT weakly biphasic  Left DP 2+  PT weakly biphasic    Plan  Continue Medical Management with asa, plavix and statin  Encourage  tobacco cessation  No plans for further endovascular surgical intervention   Based off imaging if patient does not improve may need R fem bk pop bypass     Tiffanie [place]    Pt has risk factors that put them at higher risk for the below complications and as a results they will be admitted overnight for observation post angiogram    Groin complication - bleeding, pseudoaneurysm   CKD requiring hydration   Bleeding risk associated with anticoagulants   Vascular exam post intervention    Patients Risk factors include but are not limited to   Age   PVD   Calcified vessels   CKD  

## 2019-04-30 NOTE — H&P
Vascular Surgery History & Physical Exam    Chief Complaint: Peripheral vascular disease, r LE tisseu loss    HISTORY OF PRESENT ILLNESS:    The patient is a 75 y.o. female who presents to the hospital for elective arteriogram with possible intervention. The patient has a history of peripheral vascular disease and R LE tisseu loss. ROS : All others Negative if blank [], Positive if [x]  General   [] Fevers   [] Chills   [] Weight Loss   Skin   [x] Tissue Loss   Eyes   [x] Wears Glasses/Contacts   [] Vision Changes   Respiratory    [x] Shortness of breath   Cardiovascular   [] Chest Pain   [x] Shortness of breath with exertion   Gastrointestinal   [] Abdominal Pain     Past Medical History:   Diagnosis Date    Arthritis     CAD (coronary artery disease)     CHF (congestive heart failure) (HCC)     [facility]     Diabetes ([facility]     Diabetic ulcer of right calf associated with type 2 diabetes mellitus, with fat layer exposed ([facility] 3/6/2019    HTN (hypertension)     Hyperlipidemia     Osteomyelitis ([facility]     Thyroid disease      Past Surgical History:   Procedure Laterality Date    ABDOMEN SURGERY      CARDIAC SURGERY      CHOLECYSTECTOMY      COLONOSCOPY      CORONARY ARTERY BYPASS GRAFT N/A May 22, 2013    ENDOSCOPY, COLON, DIAGNOSTIC      HYSTERECTOMY      JOINT REPLACEMENT      bilateral knees     Current Medications:     Current Outpatient Medications:     oxyCODONE (ROXICODONE) 20 MG immediate release tablet, Take 20 mg by mouth 4 times daily as needed. , Disp: , Rfl: 0    RA ASPIRIN  MG EC tablet, Take 325 mg by mouth daily, Disp: , Rfl: 0    Roflumilast (DALIRESP) 500 MCG tablet, Take 1 tablet by mouth daily, Disp: 30 tablet, Rfl: 0    Fluticasone-Umeclidin-Vilant (TRELEGY ELLIPTA) 100-62.5-25 MCG/INH AEPB, Inhale 1 puff into the lungs daily, Disp: 1 each, Rfl: 3    albuterol sulfate HFA (PROAIR HFA) 108 (90 Base) MCG/ACT inhaler, Inhale 2 Continuous, [address], [place], Last Rate: 125 mL/hr at 04/30/19 7191  Allergies:  Patient has no known allergies.   Social History     Socioeconomic History    Marital status:      Spouse name: Not on file    Number of children: Not on file    Years of education: Not on file    Highest education level: Not on file   Occupational History    Not on file   Social Needs    Financial resource strain: Not on file    Food insecurity:     Worry: Not on file     Inability: Not on file    Transportation needs:     Medical: Not on file     Non-medical: Not on file   Tobacco Use    Smoking status: Current Every Day Smoker     Packs/day: 2.00     Years: 55.00     Pack years: 110.00    Smokeless tobacco: Former User   Substance and Sexual Activity    Alcohol use: No    Drug use: No    Sexual activity: Never   Lifestyle    Physical activity:     Days per week: Not on file     Minutes per session: Not on file    Stress: Not on file   Relationships    Social connections:     Talks on phone: Not on file     Gets together: Not on file     Attends Rastafari service: Not on file     Active member of club or organization: Not on file     Attends meetings of clubs or organizations: Not on file     Relationship status: Not on file    Intimate partner violence:     Fear of current or ex partner: Not on file     Emotionally abused: Not on file     Physically abused: Not on file     Forced sexual activity: Not on file   Other Topics Concern    Not on file   Social History Narrative    Not on file     Family History   Problem Relation Age of Onset    Heart Attack Mother     Hypertension Mother     Heart Attack Father     Cancer Father     COPD Father     Cancer Sister     Other Brother     COPD Sister      PHYSICAL EXAM:    Vitals:    04/30/19 0731   BP: (!) 220/118   Pulse: 86   Resp: 16   Temp: 98.3 °F (36.8 °C)     CONSTITUTIONAL:  awake, alert, cooperative, no apparent distress, and appears stated age  NECK:  supple, symmetrical, trachea midline  LUNGS:  Mild respiratory distress, decreased resp excursion  CARDIOVASCULAR:  regular rate and rhythm  ABDOMEN:  soft, non-distended and non-tenderl   Pulse Exam   R femoral 1+ L femoral 1+   R dorsalis pedis No signl L dorsalis pedis monophasic   R posterior tibial monophasic L posterior tibial monophasic     LABS:    Lab Results   Component Value Date    WBC 5.3 04/30/2019    HGB 12.1 04/30/2019    HCT 38.9 04/30/2019     04/30/2019    PROTIME 12.0 04/30/2019    INR 1.0 04/30/2019    APTT 30.2 03/15/2019    K 4.2 04/30/2019    BUN 23 04/30/2019    CREATININE 1.3 (H) 04/30/2019     Assesment:  Peripheral vascular disease. R LE tisseu7 loss  PLAN:    · Aortogram,  Right lower extremity arteriogram, possible intervention. · I reviewed the procedure with the patient and family as available. I discussed the procedure, risks, benefits, complications, and alternatives of the procedure. They understand and consent.   All questions were answered    Alpha Ramp

## 2019-05-01 PROBLEM — D62 POSTOPERATIVE ANEMIA DUE TO ACUTE BLOOD LOSS: Status: ACTIVE | Noted: 2019-01-01

## 2019-05-01 PROBLEM — I72.4 PSEUDOANEURYSM OF LEFT FEMORAL ARTERY (HCC): Status: RESOLVED | Noted: 2019-01-01 | Resolved: 2019-01-01

## 2019-05-01 PROBLEM — S70.12XA THIGH HEMATOMA, LEFT, INITIAL ENCOUNTER: Status: ACTIVE | Noted: 2019-01-01

## 2019-05-01 PROBLEM — I72.4 PSEUDOANEURYSM OF LEFT FEMORAL ARTERY (HCC): Status: ACTIVE | Noted: 2019-01-01

## 2019-05-01 NOTE — PLAN OF CARE
Problem: Increased nutrient needs (NI-5.1)  Goal: Food and/or Nutrient Delivery  Add daily ONS  Description  Individualized approach for food/nutrient provision.   Outcome: Met This Shift

## 2019-05-01 NOTE — PROGRESS NOTES
Vascular Surgery Progress Note    Pt being seen in f/u regarding PVD    Subjective  Pt s/e. Denies new sob or cp. Mild Left groin pain. Right feet feel better. Tolerating PO. Adequate UO overnight.     Current Medications:      acetaminophen, oxyCODONE, melatonin, albuterol sulfate HFA, sodium chloride flush, morphine    calcium-vitamin D  1 tablet Oral BID    citalopram  20 mg Oral Nightly    pantoprazole  40 mg Oral Daily    linagliptin  5 mg Oral Daily    fenofibrate  54 mg Oral Daily    levothyroxine  137 mcg Oral Daily    clopidogrel  75 mg Oral Daily    metoprolol tartrate  25 mg Oral Daily    cetirizine  10 mg Oral Daily    clonazePAM  0.5 mg Oral BID    atorvastatin  80 mg Oral Nightly    diltiazem  180 mg Oral Daily    furosemide  20 mg Oral Daily    Roflumilast  500 mcg Oral Daily    aspirin  325 mg Oral Daily    sodium chloride flush  10 mL Intravenous 2 times per day    enoxaparin  40 mg Subcutaneous Daily    mometasone-formoterol  2 puff Inhalation BID    And    tiotropium  18 mcg Inhalation Daily        PHYSICAL EXAM:    BP (!) 152/70   Pulse 74   Temp 98.3 °F (36.8 °C) (Temporal)   Resp 14   Ht 5' 1\" (1.549 m)   Wt 145 lb (65.8 kg)   SpO2 92%   BMI 27.40 kg/m²     Intake/Output Summary (Last 24 hours) at 5/1/2019 1134  Last data filed at 5/1/2019 0727  Gross per 24 hour   Intake 1633.01 ml   Output 525 ml   Net 1108.01 ml      Gen Awake and Alert   CVS S1S2  Resp Good resp exursion   Abd soft nt nd   Left LE   Groin + hematoma in thigh, + ecchymosis   DP 1+  PT weakly biphasic   Right LE   DP monophasic  PT qweakly biphasic    LABS:    Lab Results   Component Value Date    WBC 7.2 05/01/2019    HGB 9.4 (L) 05/01/2019    HCT 29.9 (L) 05/01/2019     05/01/2019    PROTIME 12.0 04/30/2019    INR 1.0 04/30/2019    APTT 30.2 03/15/2019    K 4.4 05/01/2019    BUN 29 (H) 05/01/2019    CREATININE 1.4 (H) 05/01/2019     A/P POD # 1 s/p bilateral external iliac stent, R profundplasty for PVD with ulration  Left femoral pseudoaneurysm  Left thigh hematoa  Postop blood loss anemias  · Left Groin has hematoma / ecchymosis  · vascular exam is unchanged  · ok for discharge  from vascular pov  · Bun/CR stabl3  · Hgb improved after transfusion  · Us negative for pseudoaneurysm  · will arrange f/u    Nya Duran

## 2019-05-01 NOTE — PROGRESS NOTES
Nutrition Assessment    Type and Reason for Visit: Initial, Consult    Nutrition Recommendations: Continue current diet, Start ONS(ensure daily)    Nutrition Assessment: Pt adequately nourished on admit as evidenced by reported good p.o. intakes PTA and stable wt. Pt at risk for nutritional compromise 2/2 increased nutrient needs for wound healing. Will add ensure daily and continue to monitor P.O. intakes. Malnutrition Assessment:  · Malnutrition Status: Insufficient data  · Context: Acute illness or injury  · Findings of the 6 clinical characteristics of malnutrition (Minimum of 2 out of 6 clinical characteristics is required to make the diagnosis of moderate or severe Protein Calorie Malnutrition based on AND/ASPEN Guidelines):  1. Energy Intake-Unable to assess, Unable to assess    2. Weight Loss-No significant weight loss    3. Fat Loss-No significant subcutaneous fat loss    4. Muscle Loss-No significant muscle mass loss    5. Fluid Accumulation-No significant fluid accumulation    6.  Strength-Not measured    Nutrition Risk Level:  Moderate    Nutrient Needs:  · Estimated Daily Total Kcal: 8941-5794(MSJ REE 1102 x 1.1 SF )  · Estimated Daily Protein (g): 60-70(1.3-1.5 g/kg IBW)  · Estimated Daily Total Fluid (ml/day): 1219-8467(1 ml/kcal)    Nutrition Diagnosis:   · Problem: Increased nutrient needs  · Etiology: related to Increased demand for energy/nutrients     Signs and symptoms:  as evidenced by Presence of wounds    Objective Information:  · Nutrition-Focused Physical Findings: A&O, abd. hernia, +BS, +I/Os, edema WDL, PVD, hx of DM, CHF, COPD  · Wound Type: None  · Current Nutrition Therapies:  · Oral Diet Orders: General   · Oral Diet intake: Unable to assess(diet just advanced - good appetite PTA)  · Oral Nutrition Supplement (ONS) Orders: None  · Anthropometric Measures:  · Ht: 5' 1\" (154.9 cm)   · Current Body Wt: 145 lb (65.8 kg)(4/30 stated - UTO new wt)  · Admission Body Wt: 145 lb (65.8 kg)(4/30 stated)  · Usual Body Wt: 167 lb (75.8 kg)(7/2018 bed scale per EMR wt hx)  · % Weight Change:  ,  no significant wt changes per pt  · Ideal Body Wt: 105 lb (47.6 kg), % Ideal Body 138%  · Adjusted Body Wt:  , body weight adjusted for    · BMI Classification: BMI 25.0 - 29.9 Overweight    Nutrition Interventions:   Continued Inpatient Monitoring, Education Initiated    Nutrition Evaluation:   · Evaluation: Goals set   · Goals: Pt to consume >75% of most meals/ONS    · Monitoring: Meal Intake, Supplement Intake, Wound Healing, Skin Integrity, I&O, Weight, Pertinent Labs, Monitor Bowel Function      Electronically signed by Orville Barbosa RD, LD on 5/1/19 at 11:59 AM    Contact Number: [phone]

## 2019-05-01 NOTE — PLAN OF CARE
Instructed on pain scale and description of pain  Patient able to communicate level of pain according to pain scale correctly  Patient able to describe pain  No pain this shift  Patient has no pain at this time.

## 2019-05-01 NOTE — PROGRESS NOTES
Walked in brown w 2 assissts and oxygen tank appx 70 feet and returned same distance back to bed/ stephanie well/ groin stable/remains very swollen and ecchymotic left upper thigh/ puncture site soft/ no change since walking

## 2019-05-01 NOTE — ANESTHESIA POSTPROCEDURE EVALUATION
Department of Anesthesiology  Postprocedure Note    Patient: Alexandro Serrano  MRN: [id number]  YOB: 1945  Date of evaluation: 5/1/2019  Time:  7:32 AM     Procedure Summary     Date:  04/30/19 [place]  [facility] [place] [facility]    Anesthesia Start:  3629 Anesthesia Stop:  7265    Procedure:  ABDOMINAL AORTOGRAM Diagnosis:  [facility]VD [facility]peripheral vascular disease) [facility]    Scheduled Providers:   Responsible Provider:  Abhishek Cisneros MD    Anesthesia Type:  MAC ASA Status:  4          Anesthesia Type: MAC    Mechelle Phase I:      Mechelle Phase II:      Last vitals: Reviewed and per EMR flowsheets.        Anesthesia Post Evaluation    Patient location during evaluation: floor  Patient participation: complete - patient participated  Level of consciousness: awake and alert  Airway patency: patent  Nausea & Vomiting: no nausea and no vomiting  Complications: no  Cardiovascular status: blood pressure returned to baseline and hemodynamically stable  Respiratory status: acceptable  Hydration status: euvolemic

## 2019-05-29 NOTE — PROGRESS NOTES
Wound Healing Center Followup Visit Note    Referring Physician : [facility] RECORD NUMBER:  [id number]  AGE: 75 y.o. GENDER: female  : 1945  EPISODE DATE:  2019    Subjective:     Chief Complaint   Patient presents with    Wound Check     right leg wound      HISTORY of PRESENT ILLNESS HPI   Sherry Rosen is a 75 y.o. female who presents today in regards to follow up evaluation and treatment of wound/ulcer. That patient's past medical, family and social hx were reviewed and changes were made if present. History of Wound Context:  The patient has had a wound of the right calf since 19 which was first noted approximately right calf.  This has been treated dressing changes.  On their initial visit to the wound healing center, 19, the patient has noted that the wound has not been improving.  The patient has not had similar previous wounds in the past.       Pt is currently on abx, doxycycline prescribed at ER visit.     3/6/19  · abis and pvrs ordered  · Likely will need angiogram in future due to arterial occlusive disease  · aquacell  3/20/19  · Wound stable  · Significant arterial occlusive disease noted on abis and pvrs  · R CAITLIN 0.65, TBI 0.21  · Left CAITLIN of 0.69, TBI 0.66  ? Evidence of significant right iliofemoral, fem-pop and likely tibial arterial occlusive disease  ?  Left fem-pop and tibial arterial occlusive disease  · Will schedule for angiogram  · I reviewed the procedure with the patient and family as available.  I discussed the procedure, risks, benefits, complications, and alternatives of the procedure.  They understand and consent.  All questions were answered  3/27/19  · Wound stable  · Will reschedule angiogram for 4.2  · Information written for her and grandson  4/10/19  · Angiogram rescheduled for next week  19  · B/L EIA stents, R [employer] plasty DCB  · Postop Left femoral pseudoaneurysm - resolved with us guided compression  5/22/19  · Wound much improved  · Pain improved  5/29/19  · abis and pvrs fu study ordered - consider more compression depending on results  · Wound improved  · Labs ordered   · Consideration for possible graft if wound not making progrss in size     Wound/Ulcer Pain Timing/Severity: minimal, intermittent  Quality of pain: aching, throbbing  Severity:  1 / 10   Modifying Factors: Pain worsens with pressure, debridement  Associated Signs/Symptoms: drainage and pain     Ulcer Identification:  Ulcer Type: venous, arterial, diabetic and traumatic  Contributing Factors: edema, venous stasis, diabetes, smoking, arterial insufficiency and decreased tissue oxygenation     Diabetic/Pressure/Non Pressure Ulcers only:  Ulcer: Diabetic ulcer, fat layer exposed   Wound : Contusion       PAST MEDICAL HISTORY      Diagnosis Date    Arthritis     CAD (coronary artery disease)     [facility]     COPD (chronic obstructive pulmonary disease) ([facility]     Diabetes ([facility]     Diabetic ulcer of right calf associated with type 2 diabetes mellitus, with fat layer exposed ([facility] 3/6/2019    HTN (hypertension)     Hyperlipidemia     Osteomyelitis (HCC)     Postoperative anemia due to acute blood loss 5/1/2019    Pseudoaneurysm of left femoral artery ([facility] 5/1/2019    Thigh hematoma, left, initial encounter 5/1/2019    Thyroid disease      Past Surgical History:   Procedure Laterality Date    ABDOMEN SURGERY      CARDIAC SURGERY      CHOLECYSTECTOMY      COLONOSCOPY      CORONARY ARTERY BYPASS GRAFT N/A May 22, 2013    ENDOSCOPY, COLON, DIAGNOSTIC      HYSTERECTOMY      JOINT REPLACEMENT      bilateral knees     Family History   Problem Relation Age of Onset    Heart Attack Mother     Hypertension Mother     Heart Attack Father     Cancer Father     COPD Father     Cancer Sister     Other Brother     COPD Sister      Social History     Tobacco Use    Smoking status: Current Every attached Discharge Instructions    Written patient dismissal instructions given to patient and signed by patient or POA. Discharge Instructions       Visit Discharge/Physician Orders     Discharge condition: Stable     Assessment of pain at discharge:yes     Anesthetic used: 2% lidocaine gel     Discharge to: Home     Left via:Private automobile     Accompanied by: friend     ECF/HHA: n/a     Dressing Orders:RIGHT LOWER LEG-Cleanse with normal saline, pack loosely with aqaucel, dry dressing and secure. Change daily.     Apply bilaterally tubigrip. On in am and off in pm. Elevate legs as much as possible above level of the heart.     Treatment Orders:Eat a diet high in protein and vitamin C. Take a multiple vitamin daily unless contraindicated.      STOP SMOKING! !     [address] [facility] followup visit 1 week_____________________________  (Please note your next appointment above and if you are unable to keep, kindly give a 24 hour notice.  Thank you.)     Physician signature:__________________________  Chip Cedeno  If you experience any of the following, please call the [facility] during business hours:     * Increase in Pain  * Temperature over 101  * Increase in drainage from your wound  * Drainage with a foul odor  * Bleeding  * Increase in swelling  * Need for compression bandage changes due to slippage, breakthrough drainage.     If you need medical attention outside of the business hours of the [facility] please contact your PCP or go to the nearest emergency room.                       Electronically signed by Dee Moore MD on 5/29/2019 at 9:46 AM

## 2019-05-29 NOTE — PROGRESS NOTES
Outpatient Medications on File Prior to Encounter   Medication Sig Dispense Refill    TRELEGY ELLIPTA 100-62.5-25 MCG/INH AEPB inhale 1 puff by mouth once daily 1 each 5    oxyCODONE (ROXICODONE) 20 MG immediate release tablet Take 20 mg by mouth 4 times daily as needed. 0    RA ASPIRIN  MG EC tablet Take 325 mg by mouth daily  0    Roflumilast (DALIRESP) 500 MCG tablet Take 1 tablet by mouth daily 30 tablet 0    albuterol sulfate HFA (PROAIR HFA) 108 (90 Base) MCG/ACT inhaler Inhale 2 puffs into the lungs every 6 hours as needed for Wheezing or Shortness of Breath /// Same as Ventolin(Blue) Inhaler 1 Inhaler 3    furosemide (LASIX) 20 MG tablet TAKE 1 TABLET BY MOUTH ONCE DAILY  1    melatonin 3 MG TABS tablet TAKE 1 TABLET BY MOUTH AT BEDTIME  4    atorvastatin (LIPITOR) 80 MG tablet Take 1 tablet by mouth nightly 30 tablet 0    diltiazem (CARDIZEM CD) 180 MG extended release capsule Take 1 capsule by mouth daily 30 capsule 0    metoprolol tartrate (LOPRESSOR) 25 MG tablet Take 25 mg by mouth daily      loratadine (CLARITIN) 10 MG tablet Take 10 mg by mouth daily      clonazePAM (KLONOPIN) 0.5 MG tablet Take 0.5 mg by mouth 2 times daily      clopidogrel (PLAVIX) 75 MG tablet Take 1 tablet by mouth daily 30 tablet 3    levothyroxine (SYNTHROID) 137 MCG tablet Take 137 mcg by mouth Daily      Cholecalciferol (VITAMIN D3) 64137 UNITS CAPS Take by mouth every 30 days      sitaGLIPtin (JANUVIA) 50 MG tablet Take 50 mg by mouth daily      fenofibrate (TRICOR) 145 MG tablet Take 145 mg by mouth daily      calcium-vitamin D (OSCAL-500) 500-200 MG-UNIT per tablet Take 1 tablet by mouth 2 times daily.  citalopram (CELEXA) 20 MG tablet Take 20 mg by mouth nightly.  pantoprazole (PROTONIX) 40 MG tablet Take 40 mg by mouth daily. No current facility-administered medications on file prior to encounter.         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 Performed by: Adebayo Brothers MD    Consent obtained:  Yes    Time out taken:  Yes    Pain Control: Anesthetic  Anesthetic: 2% Lidocaine Gel Topical     Debridement:Excisional Debridement    Using curette the wound(s)/ulcer(s) was/were sharply debrided down through and including the removal of epidermis, dermis and subcutaneous tissue. Devitalized Tissue Debrided:  fibrin, biofilm, slough and exudate to stimulate bleeding to promote healing, post debridement good bleeding base and wound edges noted    Pre Debridement Measurements:  Are located in the [facility]  Documentation Flow Sheet    Wound/Ulcer #: 1    Post Debridement Measurements:  Wound/Ulcer Descriptions are Pre Debridement except measurements:    Wound 03/15/19 Leg Right;Medial;Lower wound # 1 acquired 2/6/19 from trauma (Active)   Wound Image   4/10/2019 10:38 AM   Dressing Status Clean;Dry; Intact 5/22/2019 12:17 PM   Dressing Changed Changed/New 5/22/2019 12:17 PM   Dressing/Treatment Alginate;Dry Dressing 5/22/2019 12:17 PM   Wound Cleansed Rinsed/Irrigated with saline 5/22/2019 12:17 PM   Wound Length (cm) 5.5 cm 5/29/2019  8:52 AM   Wound Width (cm) 4.5 cm 5/29/2019  8:52 AM   Wound Depth (cm) 0.2 cm 5/29/2019  8:52 AM   Wound Surface Area (cm^2) 24.75 cm^2 5/29/2019  8:52 AM   Change in Wound Size % (l*w) 45 5/29/2019  8:52 AM   Wound Volume (cm^3) 4.95 cm^3 5/29/2019  8:52 AM   Wound Healing % 78 5/29/2019  8:52 AM   Post-Procedure Length (cm) 5.5 cm 5/29/2019  9:39 AM   Post-Procedure Width (cm) 4.7 cm 5/29/2019  9:39 AM   Post-Procedure Depth (cm) 0.2 cm 5/29/2019  9:39 AM   Post-Procedure Surface Area (cm^2) 25.85 cm^2 5/29/2019  9:39 AM   Post-Procedure Volume (cm^3) 5.17 cm^3 5/29/2019  9:39 AM   Distance Tunneling (cm) 1.1 cm 4/10/2019 10:38 AM   Tunneling Position ___ O'Clock 1 4/10/2019 10:38 AM   Undermining Starts ___ O'Clock 11 3/20/2019 11:14 AM   Undermining Ends___ O'Clock 1 3/20/2019 11:14 AM   Undermining Maxium Distance (cm) 1.6 3/20/2019 11:14 AM   Wound Assessment Pink;Red;Yellow 5/29/2019  8:52 AM   Drainage Amount Moderate 5/29/2019  8:52 AM   Drainage Description Yellow 5/29/2019  8:52 AM   Odor None 5/29/2019  8:52 AM   Betzaida-wound Assessment Intact; Pink 5/29/2019  8:52 AM   Number of days: 75     Percent of Wound/Ulcer Debrided: 70%    Total Surface Area Debrided:  20 sq cm     Estimated Blood Loss:  Minimal  Hemostasis Achieved:  by pressure    Procedural Pain:  4  / 10   Post Procedural Pain:  3 / 10     Response to treatment:  Well tolerated by patient. Plan:   Treatment Note please see attached Discharge Instructions    Written patient dismissal instructions given to patient and signed by patient or POA.          Discharge Instructions       Visit Discharge/Physician Orders     Discharge condition: Stable     Assessment of pain at discharge:yes     Anesthetic used: 2% lidocaine gel     Discharge to: Home     Left via:Private automobile     Accompanied by: friend     ECF/HHA: n/a     Dressing Orders:RIGHT LOWER LEG-Cleanse with normal saline, pack loosely with aqaucel, dry dressing and secure. Change daily.     Apply tubigrip. On in am and off in pm. Elevate legs as much as possible above level of the heart. Treatment Orders:Eat a diet high in protein and vitamin C. Take a multiple vitamin daily unless contraindicated.      STOP SMOKING!!    [facility] followup visit 1 week_____________________________  (Please note your next appointment above and if you are unable to keep, kindly give a 24 hour notice.  Thank you.)     Physician signature:__________________________        If you experience any of the following, please call the [facility] during business hours:     * Increase in Pain  * Temperature over 101  * Increase in drainage from your wound  * Drainage with a foul odor  * Bleeding  * Increase in swelling  * Need for compression bandage changes due to slippage, breakthrough drainage.     If you need medical attention outside of the business hours of the [facility] please contact your PCP or go to the nearest emergency room.                                                              Electronically signed by Awilda Interiano MD

## 2019-06-12 NOTE — PROGRESS NOTES
Wound Healing Center Followup Visit Note    Referring Physician : [facility] RECORD NUMBER:  [id number]  AGE: 75 y.o. GENDER: female  : 1945  EPISODE DATE:  2019    Subjective:     Chief Complaint   Patient presents with    Wound Check      HISTORY of PRESENT ILLNESS HPI   Mynor Meadows is a 75 y.o. female who presents today in regards to follow up evaluation and treatment of wound/ulcer. That patient's past medical, family and social hx were reviewed and changes were made if present. History of Wound Context:  The patient has had a wound of the right calf since 19 which was first noted approximately right calf.  This has been treated dressing changes.  On their initial visit to the wound healing center, 19, the patient has noted that the wound has not been improving.  The patient has not had similar previous wounds in the past.       Pt is currently on abx, doxycycline prescribed at ER visit.     3/6/19  · abis and pvrs ordered  · Likely will need angiogram in future due to arterial occlusive disease  · aquacell  3/20/19  · Wound stable  · Significant arterial occlusive disease noted on abis and pvrs  · R CAITLIN 0.65, TBI 0.21  · Left CAITLIN of 0.69, TBI 0.66  ? Evidence of significant right iliofemoral, fem-pop and likely tibial arterial occlusive disease  ?  Left fem-pop and tibial arterial occlusive disease  · Will schedule for angiogram  · I reviewed the procedure with the patient and family as available.  I discussed the procedure, risks, benefits, complications, and alternatives of the procedure.  They understand and consent.  All questions were answered  3/27/19  · Wound stable  · Will reschedule angiogram for 4.2  · Information written for her and grandson  4/10/19  · Angiogram rescheduled for next week  19  · B/L EIA stents, R [employer] plasty DCB  · Postop Left femoral pseudoaneurysm - resolved with us guided compression  19  · Wound much improved  · Pain improved  5/29/19  · abis and pvrs fu study ordered - consider more compression depending on results  · Wound improved  · Labs ordered   · Consideration for possible graft if wound not making progrss in size  6/4/19  · Wound improved     Wound/Ulcer Pain Timing/Severity: minimal, intermittent  Quality of pain: aching, throbbing  Severity:  1 / 10   Modifying Factors: Pain worsens with pressure, debridement  Associated Signs/Symptoms: drainage and pain     Ulcer Identification:  Ulcer Type: venous, arterial, diabetic and traumatic  Contributing Factors: edema, venous stasis, diabetes, smoking, arterial insufficiency and decreased tissue oxygenation     Diabetic/Pressure/Non Pressure Ulcers only:  Ulcer: Diabetic ulcer, fat layer exposed   Wound : Contusion        PAST MEDICAL HISTORY      Diagnosis Date    Arthritis     CAD (coronary artery disease)     [facility]estive [facility] failure) [facility]     [facility]     Diabetes ([address]     Diabetic ulcer of right calf associated with type 2 diabetes mellitus, with fat layer exposed ([facility] 3/6/2019    HTN (hypertension)     Hyperlipidemia     Osteomyelitis (HCC)     Postoperative anemia due to acute blood loss 5/1/2019    Pseudoaneurysm of left femoral artery ([address] 5/1/2019    Thigh hematoma, left, initial encounter 5/1/2019    Thyroid disease      Past Surgical History:   Procedure Laterality Date    ABDOMEN SURGERY      CARDIAC SURGERY      CHOLECYSTECTOMY      COLONOSCOPY      CORONARY ARTERY BYPASS GRAFT N/A May 22, 2013    ENDOSCOPY, COLON, DIAGNOSTIC      HYSTERECTOMY      JOINT REPLACEMENT      bilateral knees     Family History   Problem Relation Age of Onset    Heart Attack Mother     Hypertension Mother     Heart Attack Father     Cancer Father     COPD Father     Cancer Sister     Other Brother     COPD Sister      Social History     Tobacco Use    Smoking status: Current Every Day Smoker     Packs/day: 2.00     Years: 55.00     Pack years: 110.00    Smokeless tobacco: Former User   Substance Use Topics    Alcohol use: No    Drug use: No     No Known Allergies  Current Outpatient Medications on File Prior to Encounter   Medication Sig Dispense Refill    TRELEGY ELLIPTA 100-62.5-25 MCG/INH AEPB inhale 1 puff by mouth once daily 1 each 5    oxyCODONE (ROXICODONE) 20 MG immediate release tablet Take 20 mg by mouth 4 times daily as needed. 0    RA ASPIRIN  MG EC tablet Take 325 mg by mouth daily  0    Roflumilast (DALIRESP) 500 MCG tablet Take 1 tablet by mouth daily 30 tablet 0    albuterol sulfate HFA (PROAIR HFA) 108 (90 Base) MCG/ACT inhaler Inhale 2 puffs into the lungs every 6 hours as needed for Wheezing or Shortness of Breath /// Same as Ventolin(Blue) Inhaler 1 Inhaler 3    furosemide (LASIX) 20 MG tablet TAKE 1 TABLET BY MOUTH ONCE DAILY  1    melatonin 3 MG TABS tablet TAKE 1 TABLET BY MOUTH AT BEDTIME  4    atorvastatin (LIPITOR) 80 MG tablet Take 1 tablet by mouth nightly 30 tablet 0    diltiazem (CARDIZEM CD) 180 MG extended release capsule Take 1 capsule by mouth daily 30 capsule 0    metoprolol tartrate (LOPRESSOR) 25 MG tablet Take 25 mg by mouth daily      loratadine (CLARITIN) 10 MG tablet Take 10 mg by mouth daily      clonazePAM (KLONOPIN) 0.5 MG tablet Take 0.5 mg by mouth 2 times daily      clopidogrel (PLAVIX) 75 MG tablet Take 1 tablet by mouth daily 30 tablet 3    levothyroxine (SYNTHROID) 137 MCG tablet Take 137 mcg by mouth Daily      Cholecalciferol (VITAMIN D3) 05510 UNITS CAPS Take by mouth every 30 days      sitaGLIPtin (JANUVIA) 50 MG tablet Take 50 mg by mouth daily      fenofibrate (TRICOR) 145 MG tablet Take 145 mg by mouth daily      calcium-vitamin D (OSCAL-500) 500-200 MG-UNIT per tablet Take 1 tablet by mouth 2 times daily.  citalopram (CELEXA) 20 MG tablet Take 20 mg by mouth nightly.       pantoprazole (PROTONIX) 40 MG tablet Take 40 mg by mouth daily. No current facility-administered medications on file prior to encounter. 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 Performed by: Adebayo Brothers MD    Consent obtained:  Yes    Time out taken:  Yes    Pain Control: Anesthetic  Anesthetic: 2% Lidocaine Gel Topical     Debridement:Excisional Debridement    Using curette the wound(s)/ulcer(s) was/were sharply debrided down through and including the removal of epidermis, dermis and subcutaneous tissue. Devitalized Tissue Debrided:  fibrin, biofilm, slough and exudate to stimulate bleeding to promote healing, post debridement good bleeding base and wound edges noted    Pre Debridement Measurements:  Are located in the [facility]  Documentation Flow Sheet    Wound/Ulcer #: 1    Post Debridement Measurements:  Wound/Ulcer Descriptions are Pre Debridement except measurements:    Wound 03/15/19 Leg Right;Medial;Lower wound # 1 acquired 2/6/19 from trauma (Active)   Dressing Status Clean;Dry; Intact 5/22/2019 12:17 PM   Dressing Changed Changed/New 5/22/2019 12:17 PM Dressing/Treatment Alginate;Dry Dressing 5/22/2019 12:17 PM   Wound Cleansed Rinsed/Irrigated with saline 5/22/2019 12:17 PM   Wound Length (cm) 5.4 cm 6/5/2019  8:23 AM   Wound Width (cm) 4.4 cm 6/5/2019  8:23 AM   Wound Depth (cm) 0.2 cm 6/5/2019  8:23 AM   Wound Surface Area (cm^2) 23.76 cm^2 6/5/2019  8:23 AM   Change in Wound Size % (l*w) 47.2 6/5/2019  8:23 AM   Wound Volume (cm^3) 4.75 cm^3 6/5/2019  8:23 AM   Wound Healing % 79 6/5/2019  8:23 AM   Post-Procedure Length (cm) 5.5 cm 6/5/2019  8:48 AM   Post-Procedure Width (cm) 4.7 cm 6/5/2019  8:48 AM   Post-Procedure Depth (cm) 0.2 cm 6/5/2019  8:48 AM   Post-Procedure Surface Area (cm^2) 25.85 cm^2 6/5/2019  8:48 AM   Post-Procedure Volume (cm^3) 5.17 cm^3 6/5/2019  8:48 AM   Wound Assessment Pink; White;Yellow 6/5/2019  8:19 AM   Drainage Amount Small 6/5/2019  8:19 AM   Drainage Description Yellow; Tan 6/5/2019  8:19 AM   Odor None 6/5/2019  8:19 AM   Betzaida-wound Assessment Pink; Intact 6/5/2019  8:19 AM   Number of days: 88     Percent of Wound/Ulcer Debrided: 80%    Total Surface Area Debrided:  20 sq cm     Estimated Blood Loss:  Minimal  Hemostasis Achieved:  by pressure    Procedural Pain:  5  / 10   Post Procedural Pain:  4 / 10     Response to treatment:  Well tolerated by patient. Plan:   Treatment Note please see attached Discharge Instructions    Written patient dismissal instructions given to patient and signed by patient or POA. Discharge Instructions       Visit Discharge/Physician Orders     Discharge condition: Stable     Assessment of pain at discharge:yes     Anesthetic used: 2% lidocaine gel     Discharge to: Home     Left via:Private automobile     Accompanied by: friend     ECF/HHA: n/a     Dressing Orders:RIGHT LOWER LEG-Cleanse with normal saline, pack loosely with aqaucel, dry dressing and secure. Change daily.     Apply bilaterally tubigrip.  On in am and off in pm. Elevate legs as much as possible above level of the heart.     Treatment Orders:Eat a diet high in protein and vitamin C. Take a multiple vitamin daily unless contraindicated.      STOP SMOKING! !     Please obtain PREALBUMIN, ALBUMIN, and HgbA1c     Please obtain arterial studies post angiogram       [facility] followup visit 1 week_____________________________  (Please note your next appointment above and if you are unable to keep, kindly give a 24 hour notice.  Thank you.)     Physician signature:__________________________  Shay Blum  If you experience any of the following, please call the [email or web address] during business hours:     * Increase in Pain  * Temperature over 101  * Increase in drainage from your wound  * Drainage with a foul odor  * Bleeding  * Increase in swelling  * Need for compression bandage changes due to slippage, breakthrough drainage.     If you need medical attention outside of the business hours of the [facility] please contact your PCP or go to the nearest emergency room.                                Electronically signed by Josie Lara MD

## 2019-07-02 NOTE — PROCEDURES
[address]                               PULMONARY FUNCTION    PATIENT NAME: Dontae Griggs                    :        1945  MED REC NO:   [id number]                            ROOM:  ACCOUNT NO:   [de-identified]                           ADMIT DATE: 2019  PROVIDER:     DANNY Ovalle MD    DATE OF PROCEDURE:  2019    HEIGHT:  58.    WEIGHT:  130. AGE:  75. BMI:  27. SEX:  Female. POST-TEST COMMENT:  The patient gave good efforts. SPIROMETRY:  1. FVC 1.37, which is 63% of predicted. 2.  FEV1 0.69, which is 40% of predicted. 3.  There is no bronchodilator response despite the patient had a 12%  change, but she did not increase by 200.  4.  MVV 23, which is 32 of predicted. LUNG VOLUME:  1. Slow vital capacity 1.42, which is 65 of predicted. 2.  ERV is 1.34, which is 93 of predicted. 3.  RV is 2.99, which is 150 of predicted. 4.  RV/TLC 68, which is 150 of predicted. 5.  Diffusion capacity 4.29, which is 26% of predicted and corrects for  alveolar volume to 57. IMPRESSION:  This PFT is showing evidence of chronic obstructive  pulmonary disease with no bronchodilator response along with severe air  trapping, along with severe-moderate-to-severe reduction in diffusion  capacity. At this point, chronic obstructive pulmonary disease and  emphysema is the most common diagnosis, but other etiology could not be  rule out based on the PFT. For further evaluation, clinical and  radiological correlation indicated. Again, this is a severe obstructive  spirometry along with severe air trapping lung volume wise, along with  moderate-to-severe  reduction in diffusion capacity.         Susie Delgado MD    D: 2019 20:02:19       T: 2019 1:19:00     [email or web address]  Job#: [id number]     Doc#: [id number]    CC:

## 2019-07-03 NOTE — PROGRESS NOTES
Orders     Discharge condition: Stable     Assessment of pain at discharge:yes     Anesthetic used: 2% lidocaine gel     Discharge to: Home     Left via:Private automobile     Accompanied by: friend     ECF/HHA: n/a     Dressing Orders:RIGHT LOWER LEG-Cleanse with normal saline, pack loosely with aqaucel, dry dressing and secure. Change daily.     Apply bilaterally spandagrip. On in am and off in pm. Elevate legs as much as possible above level of the heart.     Treatment Orders:Eat a diet high in protein and vitamin C. Take a multiple vitamin daily unless contraindicated.      STOP SMOKING! !     Drink protein shakes 2-3 times a day.     [facility] followup visit 1 week_____________________________  (Please note your next appointment above and if you are unable to keep, kindly give a 24 hour notice.  Thank you.)     Physician signature:__________________________  Contreras Huntley  If you experience any of the following, please call the [facility] during business hours:     * Increase in Pain  * Temperature over 101  * Increase in drainage from your wound  * Drainage with a foul odor  * Bleeding  * Increase in swelling  * Need for compression bandage changes due to slippage, breakthrough drainage.     If you need medical attention outside of the business hours of the [facility] please contact your PCP or go to the nearest emergency room.                                   Electronically signed by Kandi Paredes MD on 7/3/2019 at 9:52 AM

## 2019-07-10 NOTE — PROGRESS NOTES
Hypertension Mother     Heart Attack Father     Cancer Father     COPD Father     Cancer Sister     Other Brother     COPD Sister      Social History     Tobacco Use    Smoking status: Current Every Day Smoker     Packs/day: 2.00     Years: 55.00     Pack years: 110.00    Smokeless tobacco: Former User   Substance Use Topics    Alcohol use: No    Drug use: No     No Known Allergies  Current Outpatient Medications on File Prior to Encounter   Medication Sig Dispense Refill    Nutritional Supplements (GLUCERNA SHAKE) LIQD Take 1 Can by mouth 3 times daily 90 Can 1    TRELEGY ELLIPTA 100-62.5-25 MCG/INH AEPB inhale 1 puff by mouth once daily 1 each 5    oxyCODONE (ROXICODONE) 20 MG immediate release tablet Take 20 mg by mouth 4 times daily as needed.   0    RA ASPIRIN  MG EC tablet Take 325 mg by mouth daily  0    Roflumilast (DALIRESP) 500 MCG tablet Take 1 tablet by mouth daily 30 tablet 0    albuterol sulfate HFA (PROAIR HFA) 108 (90 Base) MCG/ACT inhaler Inhale 2 puffs into the lungs every 6 hours as needed for Wheezing or Shortness of Breath /// Same as Ventolin(Blue) Inhaler 1 Inhaler 3    furosemide (LASIX) 20 MG tablet TAKE 1 TABLET BY MOUTH ONCE DAILY  1    melatonin 3 MG TABS tablet TAKE 1 TABLET BY MOUTH AT BEDTIME  4    atorvastatin (LIPITOR) 80 MG tablet Take 1 tablet by mouth nightly 30 tablet 0    diltiazem (CARDIZEM CD) 180 MG extended release capsule Take 1 capsule by mouth daily 30 capsule 0    metoprolol tartrate (LOPRESSOR) 25 MG tablet Take 25 mg by mouth daily      loratadine (CLARITIN) 10 MG tablet Take 10 mg by mouth daily      clonazePAM (KLONOPIN) 0.5 MG tablet Take 0.5 mg by mouth 2 times daily      clopidogrel (PLAVIX) 75 MG tablet Take 1 tablet by mouth daily 30 tablet 3    levothyroxine (SYNTHROID) 137 MCG tablet Take 137 mcg by mouth Daily      Cholecalciferol (VITAMIN D3) 23877 UNITS CAPS Take by mouth every 30 days      sitaGLIPtin (JANUVIA) 50 MG tablet Wound/Ulcer Documentation Flow Sheet    Wound/Ulcer #: 1    Post Debridement Measurements:  Wound/Ulcer Descriptions are Pre Debridement except measurements:    Wound 03/15/19 Leg Right;Medial;Lower wound # 1 acquired 2/6/19 from trauma (Active)   Wound Image   4/10/2019 10:38 AM   Dressing Status Clean;Dry; Intact 5/22/2019 12:17 PM   Dressing Changed Changed/New 5/22/2019 12:17 PM   Dressing/Treatment Alginate;Dry Dressing 7/3/2019  8:49 AM   Wound Cleansed Rinsed/Irrigated with saline 5/22/2019 12:17 PM   Wound Length (cm) 5 cm 7/10/2019 10:47 AM   Wound Width (cm) 7 cm 7/10/2019 10:47 AM   Wound Depth (cm) 0.2 cm 7/10/2019 10:47 AM   Wound Surface Area (cm^2) 35 cm^2 7/10/2019 10:47 AM   Change in Wound Size % (l*w) 22.22 7/10/2019 10:47 AM   Wound Volume (cm^3) 7 cm^3 7/10/2019 10:47 AM   Wound Healing % 69 7/10/2019 10:47 AM   Post-Procedure Length (cm) 5.1 cm 7/10/2019 11:26 AM   Post-Procedure Width (cm) 7 cm 7/10/2019 11:26 AM   Post-Procedure Depth (cm) 0.2 cm 7/10/2019 11:26 AM   Post-Procedure Surface Area (cm^2) 35.7 cm^2 7/10/2019 11:26 AM   Post-Procedure Volume (cm^3) 7.14 cm^3 7/10/2019 11:26 AM   Distance Tunneling (cm) 1.1 cm 4/10/2019 10:38 AM   Tunneling Position ___ O'Clock 1 4/10/2019 10:38 AM   Undermining Starts ___ O'Clock 11 3/20/2019 11:14 AM   Undermining Ends___ O'Clock 1 3/20/2019 11:14 AM   Undermining Maxium Distance (cm) 1.6 3/20/2019 11:14 AM   Wound Assessment Pink;Red;Yellow 7/10/2019 10:47 AM   Drainage Amount Moderate 7/10/2019 10:47 AM   Drainage Description Serosanguinous;Tan;Yellow 7/10/2019 10:47 AM   Odor None 7/10/2019 10:47 AM   Betzaida-wound Assessment Dry; Intact 7/10/2019 10:47 AM   Number of days: 117     Percent of Wound/Ulcer Debrided: 50%    Total Surface Area Debrided:  17 sq cm     Estimated Blood Loss:  Minimal  Hemostasis Achieved:  by pressure    Procedural Pain:  5  / 10   Post Procedural Pain:  4 / 10     Response to treatment:  Well tolerated by patient.

## 2019-07-17 NOTE — PROGRESS NOTES
Wound Healing Center Followup Visit Note    Referring Physician : Ashley [facility] RECORD NUMBER:  [id number]  AGE: 75 y.o. GENDER: female  : 1945  EPISODE DATE:  2019    Subjective:     No chief complaint on file. HISTORY of PRESENT ILLNESS HPI   Anaya Ballard is a 75 y.o. female who presents today in regards to follow up evaluation and treatment of wound/ulcer. That patient's past medical, family and social hx were reviewed and changes were made if present. History of Wound Context:  The patient has had a wound of the right calf since 19 which was first noted approximately right calf.  This has been treated dressing changes.  On their initial visit to the wound healing center, 19, the patient has noted that the wound has not been improving.  The patient has not had similar previous wounds in the past.       Pt is currently on abx, doxycycline prescribed at ER visit.     3/6/19  · abis and pvrs ordered  · Likely will need angiogram in future due to arterial occlusive disease  · aquacell  3/20/19  · Wound stable  · Significant arterial occlusive disease noted on abis and pvrs  · R CAITLIN 0.65, TBI 0.21  · Left CAITLIN of 0.69, TBI 0.66  ? Evidence of significant right iliofemoral, fem-pop and likely tibial arterial occlusive disease  ?  Left fem-pop and tibial arterial occlusive disease  · Will schedule for angiogram  · I reviewed the procedure with the patient and family as available.  I discussed the procedure, risks, benefits, complications, and alternatives of the procedure.  They understand and consent.  All questions were answered  3/27/19  · Wound stable  · Will reschedule angiogram for 4.2  · Information written for her and grandson  4/10/19  · Angiogram rescheduled for next week  19  · B/L EIA stents, R [employer] plasty DCB  · Postop Left femoral pseudoaneurysm - resolved with us guided compression  19  · Wound much improved  · Pain Debridement Measurements:  Are located in the [place]  Documentation Flow Sheet    Wound/Ulcer #: 1    Post Debridement Measurements:  Wound/Ulcer Descriptions are Pre Debridement except measurements:    Wound 03/15/19 Leg Right;Medial;Lower wound # 1 acquired 2/6/19 from trauma (Active)   Dressing/Treatment Alginate;Dry Dressing 7/10/2019 11:26 AM   Wound Length (cm) 4.9 cm 7/17/2019  8:26 AM   Wound Width (cm) 5 cm 7/17/2019  8:26 AM   Wound Depth (cm) 0.2 cm 7/17/2019  8:26 AM   Wound Surface Area (cm^2) 24.5 cm^2 7/17/2019  8:26 AM   Change in Wound Size % (l*w) 45.56 7/17/2019  8:26 AM   Wound Volume (cm^3) 4.9 cm^3 7/17/2019  8:26 AM   Wound Healing % 78 7/17/2019  8:26 AM   Post-Procedure Length (cm) 5 cm 7/17/2019  8:30 AM   Post-Procedure Width (cm) 5.2 cm 7/17/2019  8:30 AM   Post-Procedure Depth (cm) 0.2 cm 7/17/2019  8:30 AM   Post-Procedure Surface Area (cm^2) 26 cm^2 7/17/2019  8:30 AM   Post-Procedure Volume (cm^3) 5.2 cm^3 7/17/2019  8:30 AM   Wound Assessment Pink;Red;Yellow 7/17/2019  8:26 AM   Drainage Amount Moderate 7/17/2019  8:26 AM   Drainage Description Serosanguinous; Yellow 7/17/2019  8:26 AM   Odor None 7/17/2019  8:26 AM   Betzaida-wound Assessment Dry; Intact 7/10/2019 10:47 AM   Number of days: 123     Percent of Wound/Ulcer Debrided: 80%    Total Surface Area Debrided:  20 sq cm     Estimated Blood Loss:  Minimal  Hemostasis Achieved:  by pressure and by silver nitrate stick    Procedural Pain:  4  / 10   Post Procedural Pain:  3 / 10     Response to treatment:  Well tolerated by patient. Plan:   Treatment Note please see attached Discharge Instructions    Written patient dismissal instructions given to patient and signed by patient or POA.          Discharge Instructions       Visit Discharge/Physician Orders     Discharge condition: Stable     Assessment of pain at discharge:yes     Anesthetic used: 2% lidocaine gel     Discharge to: Home     Left via:Private automobile     Accompanied by: friend     ECF/HHA: n/a     Dressing Orders:RIGHT LOWER LEG-Cleanse with normal saline, pack loosely with aqaucel, dry dressing and secure. Change daily.     Apply bilaterally spandagrip. On in am and off in pm. Elevate legs as much as possible above level of the heart.     Treatment Orders:Eat a diet high in protein and vitamin C. Take a multiple vitamin daily unless contraindicated.      STOP SMOKING! !     Drink protein shakes 2-3 times a day.     Tissue culture obtained at [address] [place] [address],[address] today 7-10-19     [address] [place] [address],[address] [facility] followup visit 1 week_____________________________  (Please note your next appointment above and if you are unable to keep, kindly give a 24 hour notice.  Thank you.)     Physician signature:__________________________  Tory Carter  If you experience any of the following, please call the [facility] during business hours:     * Increase in Pain  * Temperature over 101  * Increase in drainage from your wound  * Drainage with a foul odor  * Bleeding  * Increase in swelling  * Need for compression bandage changes due to slippage, breakthrough drainage.     If you need medical attention outside of the business hours of the [facility] please contact your PCP or go to the nearest emergency room.                 Electronically signed by Duane Freedman MD on 7/17/2019 at 9:21 AM

## 2019-07-31 NOTE — PROGRESS NOTES
much improved  · Pain improved  5/29/19  · abis and pvrs fu study ordered - consider more compression depending on results  · Wound improved  · Labs ordered   · Consideration for possible graft if wound not making progrss in size  6/4/19  · Wound improved  7/3/19  · Wound improved  · Arterial studies reviewed improvement noted   7/10/19  · Wound appearance improved, but slightly larger  · Drinking boost shakes  · Culture done  7/17/19  · Wound stable in size  · tx culture doxycyline bid x 10 days for s aureus  7/24/19  · Wound stable   ·  emphasized again importance of smoking cessation, nutrition  · Likely will re culture next week  7/31/19  · Wound improved 25 to 21 sq cm  · emphasized again importance of smoking cessation, nutrition - she has cut down from 2 packs to 1/2 a pack a day  · Hold on reculture for now    Wound/Ulcer Pain Timing/Severity: minimal, intermittent  Quality of pain: aching, throbbing  Severity:  1 / 10   Modifying Factors: Pain worsens with pressure, debridement  Associated Signs/Symptoms: drainage and pain     Ulcer Identification:  Ulcer Type: venous, arterial, diabetic and traumatic  Contributing Factors: edema, venous stasis, diabetes, smoking, arterial insufficiency and decreased tissue oxygenation     Diabetic/Pressure/Non Pressure Ulcers only:  Ulcer: Diabetic ulcer, fat layer exposed   Wound : Contusion                      PAST MEDICAL HISTORY      Diagnosis Date    Arthritis     CAD (coronary artery disease)     CHF ([address]estive heart failure) ([address]     COPD (chronic obstructive pulmonary disease) ([address]     Diabetes ([address]     Diabetic ulcer of right calf associated with type 2 diabetes mellitus, with fat layer exposed ([address] 3/6/2019    HTN (hypertension)     Hyperlipidemia     Osteomyelitis ([address]     Postoperative anemia due to acute blood loss 5/1/2019    Pseudoaneurysm of left femoral artery ([address] 5/1/2019    Thigh hematoma, left, initial encounter 5/1/2019    Thyroid disease      Past Surgical History:   Procedure Laterality Date    ABDOMEN SURGERY      CARDIAC SURGERY      CHOLECYSTECTOMY      COLONOSCOPY      CORONARY ARTERY BYPASS GRAFT N/A May 22, 2013    ENDOSCOPY, COLON, DIAGNOSTIC      HYSTERECTOMY      JOINT REPLACEMENT      bilateral knees     Family History   Problem Relation Age of Onset    Heart Attack Mother     Hypertension Mother     Heart Attack Father     Cancer Father     COPD Father     Cancer Sister     Other Brother     COPD Sister      Social History     Tobacco Use    Smoking status: Current Every Day Smoker     Packs/day: 2.00     Years: 55.00     Pack years: 110.00    Smokeless tobacco: Former User   Substance Use Topics    Alcohol use: No    Drug use: No     No Known Allergies  Current Outpatient Medications on File Prior to Encounter   Medication Sig Dispense Refill    Nutritional Supplements (GLUCERNA SHAKE) LIQD Take 1 Can by mouth 3 times daily 90 Can 1    TRELEGY ELLIPTA 100-62.5-25 MCG/INH AEPB inhale 1 puff by mouth once daily 1 each 5    oxyCODONE (ROXICODONE) 20 MG immediate release tablet Take 20 mg by mouth 4 times daily as needed.   0    RA ASPIRIN  MG EC tablet Take 325 mg by mouth daily  0    Roflumilast (DALIRESP) 500 MCG tablet Take 1 tablet by mouth daily 30 tablet 0    albuterol sulfate HFA (PROAIR HFA) 108 (90 Base) MCG/ACT inhaler Inhale 2 puffs into the lungs every 6 hours as needed for Wheezing or Shortness of Breath /// Same as Ventolin(Blue) Inhaler 1 Inhaler 3    furosemide (LASIX) 20 MG tablet TAKE 1 TABLET BY MOUTH ONCE DAILY  1    melatonin 3 MG TABS tablet TAKE 1 TABLET BY MOUTH AT BEDTIME  4    atorvastatin (LIPITOR) 80 MG tablet Take 1 tablet by mouth nightly 30 tablet 0    diltiazem (CARDIZEM CD) 180 MG extended release capsule Take 1 capsule by mouth daily 30 capsule 0    metoprolol tartrate (LOPRESSOR) 25 MG tablet Take 25 mg by mouth daily      loratadine (CLARITIN) 10 MG tablet 90%    Total Surface Area Debrided:  20 sq cm     Estimated Blood Loss:  Minimal  Hemostasis Achieved:  by pressure    Procedural Pain:  3  / 10   Post Procedural Pain:  2 / 10     Response to treatment:  Well tolerated by patient. Plan:   Treatment Note please see attached Discharge Instructions    Written patient dismissal instructions given to patient and signed by patient or POA. Discharge Instructions       Visit Discharge/Physician Orders     Discharge condition: Stable     Assessment of pain at discharge:yes     Anesthetic used: 2% lidocaine gel     Discharge to: Home     Left via:Private automobile     Accompanied by: friend     ECF/HHA: n/a     Dressing Orders:RIGHT LOWER LEG-Cleanse with normal saline, pack loosely with aqaucel, dry dressing and secure. Change daily.     Apply bilaterally spandagrip. On in am and off in pm. Elevate legs as much as possible above level of the heart.     Treatment Orders:Eat a diet high in protein and vitamin C. Take a multiple vitamin daily unless contraindicated.      STOP SMOKING! !     Drink protein shakes 2-3 times a day.     Start doxycycline as ordered for 10 days.     C followup visit 1 week_____________________________  (Please note your next appointment above and if you are unable to keep, kindly give a 24 hour notice.  Thank you.)     Physician signature:__________________________  Michaela Nye  If you experience any of the following, please call the [facility] during business hours:     * Increase in Pain  * Temperature over 101  * Increase in drainage from your wound  * Drainage with a foul odor  * Bleeding  * Increase in swelling  * Need for compression bandage changes due to slippage, breakthrough drainage.     If you need medical attention outside of the business hours of the [facility] please contact your PCP or go to the nearest emergency room.              Electronically signed by Shiv Mckeon MD on 7/31/2019 at 8:53

## 2019-08-15 PROBLEM — A41.9 SEPSIS (HCC): Status: ACTIVE | Noted: 2019-01-01

## 2019-08-15 NOTE — H&P
Historical Provider, MD   fenofibrate (TRICOR) 145 MG tablet Take 145 mg by mouth daily    Historical Provider, MD   calcium-vitamin D (OSCAL-500) 500-200 MG-UNIT per tablet Take 1 tablet by mouth 2 times daily. Historical Provider, MD   citalopram (CELEXA) 20 MG tablet Take 20 mg by mouth nightly. Historical Provider, MD   pantoprazole (PROTONIX) 40 MG tablet Take 40 mg by mouth daily. Historical Provider, MD       Allergies:  Patient has no known allergies. Social History:    The patient currently lives at home  TOBACCO:   reports that she has been smoking. She has a 110.00 pack-year smoking history. She has quit using smokeless tobacco.  ETOH:   reports that she does not drink alcohol. Family History:          Problem Relation Age of Onset    Heart Attack Mother     Hypertension Mother     Heart Attack Father     Cancer Father     COPD Father     Cancer Sister     Other Brother     COPD Sister        REVIEW OF SYSTEMS:   Pertinent positives as noted in the HPI. All other systems reviewed and negative. PHYSICAL EXAM:  BP (!) 184/100   Pulse 90   Temp 99 °F (37.2 °C)   Resp 17   Ht 5' 1\" (1.549 m)   Wt 150 lb (68 kg)   SpO2 97%   BMI 28.34 kg/m²   General appearance: Moderate apparent distress, appears stated age and cooperative. HEENT: Normal cephalic, atraumatic without obvious deformity. Pupils equal, round. Extra ocular muscles intact. Conjunctivae/corneas clear. Neck: Supple, with full range of motion. No jugular venous distention. Trachea midline. Respiratory:  Increased respiratory effort. Coarse breath sounds throughout. Cardiovascular: Tachycardic with normal S1/S2 without murmurs, rubs or gallops. Brisk capillary refill. 2+ lower extremity pulses (dorsalis pedis). Abdomen: Soft, non-tender, non-distended with normal bowel sounds. Musculoskeletal: No clubbing, cyanosis or edema bilaterally. Full range of motion without deformity. Brisk capillary refill.  2+

## 2019-08-15 NOTE — PROGRESS NOTES
[facility] Quality Flow/Interdisciplinary Rounds Progress Note        Quality Flow Rounds held on August 15, 2019    Disciplines Attending:  Bedside Nurse, , , Nursing Unit Leadership and Physical Therapy    Lori Ennis was admitted on 8/14/2019 11:28 PM    Anticipated Discharge Date:  Expected Discharge Date: 08/19/19    Disposition:    Beny Score:  Beny Scale Score: 16    Readmission Risk              Risk of Unplanned Readmission:        23           Discussed patient goal for the day, patient clinical progression, and barriers to discharge. The following Goal(s) of the Day/Commitment(s) have been identified:  Continue current treatment.        [facility]  August 15, 2019

## 2019-08-15 NOTE — CONSULTS
mL  FiO2 : 40 %  Additional Respiratory  Assessments  Pulse: 91  Resp: 17  SpO2: 96 %    ABGs:   Recent Labs     08/15/19  0606   PH 7.289*   PCO2 49.8*   PO2 70.2   HCO3 23.4   BE -3.5*   O2SAT 92.6       Laboratory findings:  Complete Blood Count: Recent Labs     08/15/19  0015   WBC 6.5   HGB 11.9   HCT 40.5           Last 3 Blood Glucose:   Recent Labs     08/15/19  0015   GLUCOSE 146*        PT/INR:    Lab Results   Component Value Date    PROTIME 12.0 04/30/2019    INR 1.0 04/30/2019     PTT:    Lab Results   Component Value Date    APTT 30.2 03/15/2019       Comprehensive Metabolic Profile:   Recent Labs     08/15/19  0015      K 4.3      CO2 25   BUN 42*   CREATININE 1.9*   GLUCOSE 146*   CALCIUM 9.6   PROT 6.8   LABALBU 3.5   BILITOT 0.9   ALKPHOS 83   AST 40*   ALT 23      Magnesium:   Lab Results   Component Value Date    MG 2.1 12/15/2018     Phosphorus:   Lab Results   Component Value Date    PHOS 1.3 07/12/2017     Ionized Calcium: No results found for: CAION   Troponin:   Recent Labs     08/15/19  0015   TROPONINI 0.06*     Urinalysis: Urine dipstick shows not done. Micro exam: not done.      Microbiology:  Cultures during this admission:     Blood cultures:                 [] None drawn      [] Negative             []  Positive (Details:  )  Urine Culture:                   [] None drawn      [] Negative             []  Positive (Details:  )  Sputum Culture:               [] None drawn       [] Negative             []  Positive (Details:  )   Endotracheal aspirate:     [] None drawn       [] Negative             []  Positive (Details:  )     Other pertinent Labs:     Radiology/Imaging:    EKG (08/15/2019)  Normal sinus rhythm  Possible Left atrial enlargement  ST & T wave abnormality, consider inferior ischemia  ST & T wave abnormality, consider anterolateral ischemia  Abnormal ECG           Ct Head Wo Contrast    Result Date: 8/15/2019  EXAM: CT Head Without Contrast EXAM DATE/TIME: 8/15/2019 1:30 AM CLINICAL HISTORY: 75years old, female; Altered mental status/memory loss; Additional info: AMS TECHNIQUE: Imaging protocol: Computed tomography images of the head without contrast. Coronal and sagittal reformatted images were created and reviewed. Radiation optimization: All CT scans at this facility use at least one of these dose optimization techniques: automated exposure control; mA and/or kV adjustment per patient size (includes targeted exams where dose is matched to clinical indication); or iterative reconstruction. Other technique: STROKE PROTOCOL was implemented. 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 Mild atrophy and mild white matter chronic microvascular changes are noted. No hemorrhage or CT evidence of acute infarction is seen. Ventricles: Normal. No ventriculomegaly. Bones/joints: Unremarkable. No acute fracture. Sinuses: Mild bilateral maxillary sinusitis is noted. Mastoid air cells: Visualized mastoid air cells are well aerated. No mastoid effusion. Soft tissues: Bilateral periorbital soft tissue swelling is appreciated. No acute intracranial abnormality. Mild sinusitis. ASSESSMENT: ASPECTS ([address] [facility] Stroke Program Early CT Score) is 10. This report has been electronically signed by Ahmet Fraire MD.    Xr Chest Portable    Result Date: 2019  Patient MRN:  [id number] : 1945 Age: 75 years Gender: Female Order Date:  2019 11:30 PM EXAM: XR CHEST PORTABLE NUMBER OF IMAGES:  1 INDICATION:  fever fever COMPARISON: 2018  RESULT: Lines, tubes, and devices:  None. Lungs and pleura: There are multifocal consolidations in the bilateral lungs, most notably within the right perihilar region, concerning for infection. No large pleural effusion or pneumothorax. Cardiomediastinal silhouette:  Normal heart size. Other:  No acute osseous abnormality.  Degenerative changes of the spine. Multifocal consolidations in bilateral lungs, concerning for pneumonia. Follow-up to resolution is recommended. ASSESSMENT  And PLAN:     Oskar Estrada, a 76-year-old F, with PMH of CHF, CAD, COPD, HTN, HLD, diabetes, hypothyroid is admitted to the MICU due to AMS and hypoglycemia. Neurologic  AMS 2/2 hypercapnia vs hypoglycemia vs UTI  CT negative  Awake protecting airway  Hypercapnia resolving 62-49  Hypoglycemia resolving  Check urine drug screen  Monitor neuro status    Cardiovascular  History of CAD, PVD  On Plavix  Hold home meds -resume aspirin  Check EKG -results above  Trend troponins q6 x 2  Troponin 0.06  Check CMP, mag, phos    Pulmonary  COPD exacerbation? History of COPD  On Solu-Medrol 40 mg every 8  BiPAP  Breathing treatments DuoNeb Perforomist Pulmicort  CXR -multiple consolidations, PNA? Received Vanco and cefepime in the ED  PNA? 2/2 CAP VS aspiration  ? Aspiration pneumonia  Started Zosyn  Check respiratory film array  Check respiratory cultures  Urine strep Legionella antigen  Consult pulmonologist  Check ABGs  pH 7.3, PCO2 49, PO2 70, HCO3 24  Metaneb treatment 4 times a day    Gastrointestinal  History of hernia  Diet n.p.o. Prophylaxis Protonix  Mild transaminitis  Albumin 3.4  AST 38    Infectious Disease  Ulcers/wound on right calf  Check wound cultures  Check lactic acid  Check procalcitonin  Check blood cultures  Check urine cultures  ? PNA 2/2 CAP vs aspiration  ? UTI    Endocrine  History of diabetes mellitus  Admitted due to hypoglycemia -resolving  Hypoglycemia 2/2 medication overdose vs other causes  Started hypoglycemia protocol  POCT glucose every 4  Glucose 136  Check cortisol  Check CMP  History of hypothyroidism  On levothyroxine at home  Check TSH   TSH 0.57    Genitourinary/Renal  MICHELLE  CR 1.9 (baseline 1.1)  Check BMP  Urine electrolytes  Urine creatinine and urine urea  Started lactated Ringer's 35 cc/h  CK normal r/o rhabdo  CK 107        WEAN PER PROTOCOL:  [] No   [] Yes  [x] N/A    ICU PROPHYLAXIS:  Stress ulcer:  [x] PPI Agent  [] [id number] [] Sucralfate  [] Other:  VTE:   [] Enoxaparin  [x] Unfract. Heparin Subcut  [] EPC Cuffs    NUTRITION:  [x] NPO [] Tube Feeding (Specify: ) [] TPN  [] PO (Diet: Diet NPO Effective Now)    INSULIN DRIP:   [x] No   [] Yes    CONSULTATION NEEDED:   [] No   [x] Yes    FAMILY UPDATED:    [] No   [x] Yes    TRANSFER OUT OF ICU:   [x] No   [] Yes    [address], DO, PGY-1  Internal Medicine Resident    Attending Physician: Dr. Dawson Sat  8/15/2019, 6:11 AM     I personally saw, examined and provided care for the patient. Radiographs, labs and medication list were reviewed by me independently. I spoke with bedside nursing, therapists and consultants. Critical care services and times documented are independent of procedures and multidisciplinary rounds with Residents. Additionally comprehensive, multidisciplinary rounds were conducted with the MICU team. The case was discussed in detail and plans for care were established. Review of Residents documentation was conducted and revisions were made as appropriate. I agree with the above documented exam, problem list and plan of care with the following additions:    75year old female known to Dr. Ju Friedman with a history of severe emphysema and chronic respiratory failure admitted to the ICU with acute on chronic hypoxic/hypercapnic respiratory failure and pneumonia. There are concerns for aspiration. On exam she is awake on BiPAP support, lungs with rhonchi and reduced BS. Plan for PAP support, IV steroids, bronchodilators with metaneb, empiric Zosyn, broad cultures, urine antigens, monitor ABG. Discussed with family. 38 minutes of CCT spent with the patient, reviewing the chart including imaging studies, and discussing the case with other health care professionals.     Nacho Montoya MD

## 2019-08-15 NOTE — CONSULTS
infusion   Intravenous Continuous Maria L Copeland MD 50 mL/hr at 08/15/19 0833      glucose (GLUTOSE) 40 % oral gel 15 g  15 g Oral PRN Lulu Mast MD        dextrose 50 % IV solution  12.5 g Intravenous PRN Lulu Mast MD        glucagon (rDNA) injection 1 mg  1 mg Intramuscular PRN Lulu Mast MD        dextrose 5 % solution  100 mL/hr Intravenous PRN Lulu Mast MD        levothyroxine (SYNTHROID) tablet 137 mcg  137 mcg Oral Daily Lulu Mast MD   137 mcg at 08/15/19 0810    clopidogrel (PLAVIX) tablet 75 mg  75 mg Oral Daily Lulu Mast MD   75 mg at 08/15/19 0810    hydrALAZINE (APRESOLINE) injection 10 mg  10 mg Intravenous Q6H PRN Maria L Copeland MD   10 mg at 08/15/19 2373    ipratropium-albuterol (DUONEB) nebulizer solution 1 ampule  1 ampule Inhalation Q4H WA Maria L Copeland MD           SOCIAL AND OCCUPATIONAL HEALTH:  Social History     Tobacco Use   Smoking Status Current Every Day Smoker    Packs/day: 2.00    Years: 55.00    Pack years: 110.00   Smokeless Tobacco Former User     TB :No   Pets cat and dog   Industrial exposure:  Birds :no     SURGICAL HISTORY:   Past Surgical History:   Procedure Laterality Date    ABDOMEN SURGERY      CARDIAC SURGERY      CHOLECYSTECTOMY      COLONOSCOPY      CORONARY ARTERY BYPASS GRAFT N/A May 22, 2013    ENDOSCOPY, COLON, DIAGNOSTIC      HYSTERECTOMY      JOINT REPLACEMENT      bilateral knees       FAMILY HISTORY:   Lung cancer:No   DVT or PE ,no has stroke     REVIEW OF SYSTEMS:  Constitutional: General health is good . There has been no weight changes. No fevers, fatigue or weakness. Head: Patient denies any history of trauma, convulsive disorder or syncope. Skin:  Patient denies history of changes in pigmentation, eruptions or pruritus. No easy bruising or bleeding.   EENT: no nasal congestion   Cardiovascular ,No chest pain ,No edema ,  Respiration:SOB:+  ,REYES :++  Gastrointestinal:No GI bleed ,no melena  ,no 12%  change, but she did not increase by 200.  4.  MVV 23, which is 32 of predicted.     LUNG VOLUME:  1. Slow vital capacity 1.42, which is 65 of predicted. 2.  ERV is 1.34, which is 93 of predicted. 3.  RV is 2.99, which is 150 of predicted. 4.  RV/TLC 68, which is 150 of predicted. 5.  Diffusion capacity 4.29, which is 26% of predicted and corrects for  alveolar volume to 57ed PFT     IMPRESSION:    *- acute Hypercapnic /hypoxic, respiratory failure  *-Acute encephalopathy due hypoglycemia/Pneumonia ,metabolic  1-COPD ,Severe ,FEV1 43 %  2-mediastinal adenopathy with Right LLL atelectasis   3-recent h/o CVA and CAD,s/p CABG   4-Lung nodule s            5- nicotine dependences   6 chronic hypoxic respiratory failure     PLAN:      Patient had complete work-up in the office had PFT compatible with severe COPD, hyper CAT scan shows some nodularity with no clear-cut mass or adenopathy that was in June 2019    On  trelegy   -Eosinophilic count 90  IgE could not visualize   Continue Daliresp  Respiratory allergy profile   CT scan chest without contrast done in June and show some nodular     At this point I will qualify the patient to go home on trilogy/noninvasive mechanical ventilation following her IgE level and the rest of work-up for her  As  Has hernia in her abdomen may need surgical evaluation    Hypoglycemia as per ICU team  Viral  panel came back negative follow-up and work-up    Thank you for allowing me to participate in Maggie Holstein care. I will keep following with you ,should you have any concerns ,please contact me at [phone]     Sincerely,        Blade Mallory MD  Pulmonary & Critical Care Medicine     NOTE: This report was transcribed using voice recognition software. Every effort was made to ensure accuracy; however, inadvertent computerized transcription errors may be present.

## 2019-08-15 NOTE — ED PROVIDER NOTES
------------------------------ ED COURSE/MEDICAL DECISION MAKING----------------------  Medications   cefepime (MAXIPIME) 2 g IVPB extended (mini-bag) (2 g Intravenous New Bag 8/15/19 0054)   ipratropium-albuterol (DUONEB) nebulizer solution 1 ampule (1 ampule Inhalation Given 8/14/19 2347)   acetaminophen (TYLENOL) suppository 650 mg (650 mg Rectal Given 8/15/19 0054)   vancomycin 1000 mg IVPB in 250 mL D5W addavial (1,000 mg Intravenous New Bag 8/15/19 0054)       Medical Decision Making:    Patient arrived in the ED for hypoglycemia. Patient was found by family in the fetal position and not responding well and called EMS. Upon arrival EMS said the patients BGL was 54, temperature was 100.4, and that she had an elevated heart rate. EMS gave the patient d10 en route. Patient is on 2 liters of oxygen at home and has a hx of COPD. Duoneb and tylenol given. XR of the chest, lab work, and EKG ordered. Re-Evaluations:           Patient rechecked multiple times in the emergency department and is awake and able to communicate she is oriented x3. Patient placed on BiPAP and breathing much easier. Patient oriented and again reporting no chest pain or difficulty breathing. Patient was initially given breathing treatments as well as given IV antibiotics for pneumonia. Rectal temp though is normal.  Patient made aware of admission. Consultations:             I did speak to intensivist and discussed ABG results. Patient is able to communicate and oriented and is in no distress here. We will hold off on intubation patient will be continually observed in the ICU and repeat ABG be obtained. Call was placed to internal medicine for admission    Please note that the withdrawal or failure to initiate urgent interventions for this patient would likely result in a life threatening deterioration or permanent disability.       Accordingly this patient received 30 minutes of critical care time, excluding separately billable procedures. Counseling: The emergency provider has spoken with the patient and discussed todays results, in addition to providing specific details for the plan of care and counseling regarding the diagnosis and prognosis. Questions are answered at this time and they are agreeable with the plan.     --------------------------------- IMPRESSION AND DISPOSITION ---------------------------------    IMPRESSION  1. Septicemia ([facility]    2. Urinary tract infection without hematuria, site unspecified    3. Acute respiratory failure, unspecified whether with hypoxia or hypercapnia ([facility]    4. Pneumonia due to organism        DISPOSITION  Disposition: Admit to ICU  Patient condition is fair    8/14/19, 11:25 PM.    This note is prepared by Asia Brown, acting as Scribe for Marly Rendon MD.    Marly Rendon MD:  The scribe's documentation has been prepared under my direction and personally reviewed by me in its entirety. I confirm that the note above accurately reflects all work, treatment, procedures, and medical decision making performed by me.           Marly Rendon MD  08/15/19 5814       Marly Rendon MD  08/15/19 9152

## 2019-08-15 NOTE — FLOWSHEET NOTE
Purple%Wound Bed 100   Wound 08/15/19 Leg Right; Lower;Medial   Date First Assessed/Time First Assessed: 08/15/19 1142   Present on Hospital Admission: Yes  Location: Leg  Wound Location Orientation: Right; Lower;Medial   Wound Image    Dressing/Treatment Wound gel; Other (comment)  (opticel,stratasorb)   Wound Cleansed Rinsed/Irrigated with saline   Wound Length (cm) 4.8 cm   Wound Width (cm) 3 cm   Wound Depth (cm) 0.1 cm   Wound Surface Area (cm^2) 14.4 cm^2   Wound Volume (cm^3) 1.44 cm^3   Wound Assessment Pink   Drainage Amount None   Betzaida-wound Assessment Intact   [place]%Wound Bed 100     **Informed Consent**    The patient has given verbal consent to have photos taken and inserted into their chart as part of their permanent medical record for purposes of documentation, treatment management and/or medical review. All Images taken on 8/15/19 of patient name: Mesfin Pitt were transmitted and stored on secured [place] located within [email or web address] by a registered [employer] Mobile Application Device. Impression:  Bilateral inner knees DTI  RLE vascular ulcer      Plan:Foam dressing to bilateral inner knees  Wound gel and opticel to the RLE wound. Active with the [facility] wound center will fax face sheet to the wound center  Continued preventive care.  Roxana Escobedo 8/15/2019 11:45 AM

## 2019-08-16 NOTE — CONSULTS
08/16/19 1342    pantoprazole (PROTONIX) injection 40 mg  40 mg Intravenous Daily Rashida Patel MD   40 mg at 08/16/19 6375    lactated ringers infusion   Intravenous Continuous Henry Schwartz MD 50 mL/hr at 08/15/19 0833      glucose (GLUTOSE) 40 % oral gel 15 g  15 g Oral PRN Rashida Patel MD        dextrose 50 % IV solution  12.5 g Intravenous PRN Rashida Patel MD        glucagon (rDNA) injection 1 mg  1 mg Intramuscular PRN Rashida Patel MD        dextrose 5 % solution  100 mL/hr Intravenous PRN Rashida Patel MD        levothyroxine (SYNTHROID) tablet 137 mcg  137 mcg Oral Daily Rashida Patel MD   137 mcg at 08/16/19 0603    clopidogrel (PLAVIX) tablet 75 mg  75 mg Oral Daily aRshida Patel MD   75 mg at 08/16/19 0904    ipratropium-albuterol (DUONEB) nebulizer solution 1 ampule  1 ampule Inhalation Q4H WA Henry Schwartz MD   1 ampule at 08/16/19 1522    insulin lispro (HUMALOG) injection vial 0-6 Units  0-6 Units Subcutaneous Q4H Henry Schwartz MD   2 Units at 08/16/19 1722    melatonin tablet 3 mg  3 mg Oral Nightly PRN Carito Mattson MD   3 mg at 08/15/19 2152       No Known Allergies    Surgical History  Past Surgical History:   Procedure Laterality Date    ABDOMEN SURGERY      CARDIAC SURGERY      CHOLECYSTECTOMY      COLONOSCOPY      CORONARY ARTERY BYPASS GRAFT N/A May 22, 2013    ENDOSCOPY, COLON, DIAGNOSTIC      HYSTERECTOMY      JOINT REPLACEMENT      bilateral knees        Social History  Social History     Socioeconomic History    Marital status:    Tobacco Use    Smoking status: Current Every Day Smoker     Packs/day: 2.00     Years: 55.00     Pack years: 110.00    Smokeless tobacco: Former User   Substance and Sexual Activity    Alcohol use: No    Drug use: No    Sexual activity: Never       Family Medical History  Family History   Problem Relation Age of Onset    Heart Attack Mother     Hypertension Mother     Heart Attack Father     Cancer Father

## 2019-08-16 NOTE — PROGRESS NOTES
CT chest today. States  \"feeling better today\"  On nasal cannula at present. Start diet today. Labs and xrays discussed.

## 2019-08-17 NOTE — PROGRESS NOTES
lung  bases. 3. Hyperinflation of both lungs might suggest chronic obstructive  pulmonary disease. Clinical correlation recommended. 4. Atherosclerotic disease associated with the coronary arteries. 5. Partial visualization of bowel containing ventral abdominal wall  Hernia. 8/16 CXR read as Stable, enlarged cardiomediastinal silhouette with underlying  central pulmonary vascular congestion. 2. Right upper and midlung airspace disease concerning for an  infiltrate/pneumonia. Continued follow-up to complete resolution is  recommended.     Assessment:  · 73/F admitted with hypoglycemia; suspected pneumonia; ID, ICU and pulm following; empiric vancomycin and zosyn  · 1 BC CONS, resp culture rare PMNs few GPC clusters and pairs, mrsa nares negative  · Clinical pharmacist consulted to dose vancomycin; goal trough 15 - 20 mcg/mL  · MICHELLE resolving    Plan:  · Continue vancomycin 1250 mg IV q24 hr  · Follow up final cultures, ID recommendations, de-escalate as appropriate  · Pharmacist will follow and monitor/adjust dosing as necessary    Kevin Conrad PharmD, [facility], [address] [facility] 8/17/2019 9:12 AM  Pager number: [phone]

## 2019-08-17 NOTE — PROGRESS NOTES
Date: 8/16/2019    Time: 10:36 PM    Patient Placed On BIPAP/CPAP/ Non-Invasive Ventilation? Yes    If no must comment. Facial area red/color change? No           If YES are Blister/Lesion present? No   If yes must notify nursing staff  BIPAP/CPAP skin barrier?   Yes    Skin barrier type:Liquicel       Comments:        Jona Haley

## 2019-08-18 PROBLEM — T14.90XA SOFT TISSUE INJURY: Status: RESOLVED | Noted: 2018-07-14 | Resolved: 2019-01-01

## 2019-08-18 PROBLEM — J96.02 ACUTE RESPIRATORY FAILURE WITH HYPERCAPNIA (HCC): Status: RESOLVED | Noted: 2018-07-15 | Resolved: 2019-01-01

## 2019-08-18 NOTE — PROGRESS NOTES
crackles, no wheezes  Cardiovascular: Regular rate and rhythm, no murmurs  Gastrointestinal: Bowel sounds present, soft, nontender  Skin: Warm and dry, no active dermatoses  Musculoskeletal: No joint swelling, no joint erythema. On exam on 08/16: Right medial knee with violaceous skin patch. Left medial knee with ruptured bulla. Partial-thickness ulcer on the posteromedial right lower leg with healthy-looking wound base with no surrounding necrosis or active purulence. Labs, imaging, and medical records/notes were personally reviewed. Assessment:  Coagulase-negative Staphylococci on blood culture, probably contaminant  Aspiration pneumonia vs pneumonitis    Recommendations:  Continue piperacillin tazobactam for now. Anticipate switch to oral antibiotic to complete 7 days of therapy. Follow-up blood cultures from 08/15 and from 08/16. Thank you for involving me in the care of Lori Ennis. I will continue to follow. Please do not hesitate to call for any questions or concerns.     Electronically signed by Kate Mackey MD on 8/18/2019 at 10:06 AM

## 2019-08-18 NOTE — PROGRESS NOTES
2. Opacity previously noted in the right midlung has resolved. 3. Stable pulmonary vascular congestion. CT CHEST WO CONTRAST   Final Result      1. New opacities present within right upper lobe may indicate new   atelectasis or pneumonia. 2. Areas of subsegmental atelectasis or scarring seen in the lung   bases. 3. Hyperinflation of both lungs might suggest chronic obstructive   pulmonary disease. Clinical correlation recommended. 4. Atherosclerotic disease associated with the coronary arteries. 5. Partial visualization of bowel containing ventral abdominal wall   hernia. XR CHEST PORTABLE   Final Result      CT Head WO Contrast   Final Result   No acute intracranial abnormality. Mild sinusitis. ASSESSMENT:    ASPECTS ([address] [facility] Stroke Program Early CT Score) is 10. This report has been electronically signed by Hazel Monroy MD.      XR CHEST PORTABLE   Final Result      Multifocal consolidations in bilateral lungs, concerning for   pneumonia. Follow-up to resolution is recommended. Assessment/Plan:  Active Hospital Problems    Diagnosis Date Noted    Acute on chronic respiratory failure with hypoxia and hypercapnia (HCC) [R65.63, J96.22] 12/15/2018     Priority: High     Class: Chronic    Sepsis ([address] [A41.9] 08/15/2019    COPD mixed type ([address] [J44.9] 07/14/2018    HLD (hyperlipidemia) [E78.5] 07/14/2018    HTN (hypertension) [I10] 07/14/2018    Type 2 diabetes mellitus with renal complication ([address] [E49.15] 07/14/2018       Plan:  · Transferred out of ICU [facility]. She continues to beg to go home   · Awaiting input from Consultants prior to D/C   · Sepsis currently resolved  · Planned Transition to Oral Agents  · Add Dee Hydralazine for Hypertension   · Restart home Lasix Dose   · Anticipate D/C in 24-48 hours     Body mass index is 28.76 kg/m².     · DVT Prophylaxis  · heparin    · Diet  DIET GENERAL; Carb Control: 4 carb choices

## 2019-08-18 NOTE — PLAN OF CARE
Problem: Gas Exchange - Impaired:  Goal: Levels of oxygenation will improve  Description  Levels of oxygenation will improve  Outcome: Met This Shift     Problem: Infection, Septic Shock:  Goal: Will show no infection signs and symptoms  Description  Will show no infection signs and symptoms  Outcome: Met This Shift     Problem: Tissue Perfusion, Altered:  Goal: Circulatory function within specified parameters  Description  Circulatory function within specified parameters  Outcome: Met This Shift     Problem: Venous Thromboembolism:  Goal: Will show no signs or symptoms of venous thromboembolism  Description  Will show no signs or symptoms of venous thromboembolism  Outcome: Met This Shift  Goal: Absence of signs or symptoms of impaired coagulation  Description  Absence of signs or symptoms of impaired coagulation  Outcome: Met This Shift     Problem: Pain:  Goal: Pain level will decrease  Description  Pain level will decrease  Outcome: Met This Shift     Problem: Falls - Risk of:  Goal: Will remain free from falls  Description  Will remain free from falls  Outcome: Met This Shift  Goal: Absence of physical injury  Description  Absence of physical injury  Outcome: Met This Shift     Problem: Risk for Impaired Skin Integrity  Goal: Tissue integrity - skin and mucous membranes  Description  Structural intactness and normal physiological function of skin and  mucous membranes. Outcome: Met This Shift     Problem: Skin Integrity:  Goal: Will show no infection signs and symptoms  Description  Will show no infection signs and symptoms  Outcome: Met This Shift  Goal: Absence of new skin breakdown  Description  Absence of new skin breakdown  Outcome: Met This Shift     Problem: Increased nutrient needs (NI-5.1)  Goal: Food and/or Nutrient Delivery  Description  Individualized approach for food/nutrient provision.   8/18/2019 1139 by Ruben Lackey RD, LD  Outcome: Met This Shift

## 2019-08-19 NOTE — PROGRESS NOTES
prior to returning room. Pt educated on benefits of increasing activity and OOB activity. Pt took seated rest break following amb. Pt performed 5x STS with VCs for hand placement approximately 25% of the time. Pt educated on performing LAQs, seated marches and APs. Return demonstration performed. Pt was left sitting up in bedside chair with call button within reach and all needs met. Patient education  Pt educated on PT role, safety with mobility, transfer technique, gait training and postural reducation. Education provided on benefits of OOB activity to prevent iatrogenic effects. Patient response to education:   Pt verbalized understanding Pt demonstrated skill Pt requires further education in this area   Yes Requires assist/VCs Yes     Rehab potential is Good for reaching above PT goals. Pts/ family goals   1. To return home. Patient and or family understand(s) diagnosis, prognosis, and plan of care. PLAN  PT care will be provided in accordance with the objectives noted above. Whenever appropriate, clear delegation orders will be provided for nursing staff. Exercises and functional mobility practice will be used as well as appropriate assistive devices or modalities to obtain goals. Patient and family education will also be administered as needed. Frequency of treatments will be 2-5x/week x 7-10 days.     Time in: 7332  Time out: [address], DPT  License [id number]

## 2019-08-19 NOTE — PROGRESS NOTES
Hospitalist Progress Note      PCP: Otilia Díaz DO    Date of Admission: 8/14/2019  Days in the hospital: 4    Chief Complaint: shortness of breath    Hospital Course:     Seen by pulmonology. Placed on bipap initially. Pulmonology will attempt to get a trilogy set up for the patient at home. Respiratory film assay negative. Now on NC O2. Continued on vanc and zosyn. Infectious disease consulted due to gram positive bacteremia. Transferred from ICU to [facility]    Subjective  Patient seen and examined at bedside. Blood Pressure remains elevated and requiring IV PRN Hydralazine   Added PO Hydralazine, Tolerating   States breathing is at baseline to small improvemnet   Patient denies any fevers, chills, chest pain, nausea, vomiting.       Medications:  Reviewed    Infusion Medications    dextrose       Scheduled Medications    hydrALAZINE  25 mg Oral 3 times per day    furosemide  20 mg Oral Daily    citalopram  20 mg Oral Nightly    methylPREDNISolone  40 mg Intravenous Q12H    insulin lispro  0-6 Units Subcutaneous TID WC    insulin lispro  0-3 Units Subcutaneous Nightly    diltiazem  180 mg Oral Daily    piperacillin-tazobactam  3.375 g Intravenous Q8H    sodium chloride flush  10 mL Intravenous 2 times per day    heparin (porcine)  5,000 Units Subcutaneous 3 times per day    formoterol  20 mcg Nebulization BID    budesonide  250 mcg Nebulization BID    pantoprazole  40 mg Intravenous Daily    levothyroxine  137 mcg Oral Daily    clopidogrel  75 mg Oral Daily    ipratropium-albuterol  1 ampule Inhalation Q4H WA     PRN Meds: phenylephrine-mineral oil-petrolatum, hydrALAZINE, acetaminophen, melatonin, sodium chloride flush, magnesium hydroxide, ondansetron, glucose, dextrose, glucagon (rDNA), dextrose      Intake/Output Summary (Last 24 hours) at 8/19/2019 1646  Last data filed at 8/19/2019 1450  Gross per 24 hour   Intake 2170 ml   Output 1926 ml   Net 244 ml       Exam:    BP (!) 188/83 Pulse 83   Temp 98.2 °F (36.8 °C) (Temporal)   Resp 18   Ht 5' 1\" (1.549 m)   Wt 150 lb 4.8 oz (68.2 kg)   SpO2 99%   BMI 28.40 kg/m²   Temp (24hrs), Av.2 °F (36.8 °C), Min:97.8 °F (36.6 °C), Max:98.5 °F (36.9 °C)    General appearance: No apparent distress, appears stated age and cooperative. HEENT: Normal cephalic, atraumatic without obvious deformity. Pupils equal, round. Extra ocular muscles intact. Conjunctivae/corneas clear. Neck: Supple, with full range of motion. No jugular venous distention. Trachea midline. Respiratory:  Increased respiratory effort. Coarse breath sounds throughout. No wheeze   Cardiovascular: Tachycardic with normal S1/S2 without murmurs, rubs or gallops. Brisk capillary refill. 2+ lower extremity pulses (dorsalis pedis). Abdomen: Soft, non-tender, non-distended with normal bowel sounds. Musculoskeletal: No clubbing, cyanosis or edema bilaterally. Full range of motion without deformity. Brisk capillary refill. 2+ lower extremity pulses (dorsalis pedis). Skin: Normal skin color. Barrier dressings noted on medial bilateral knees. Neurologic: Improved mentation, following commands  . Labs:   Recent Labs     19  0530 19  0619  0628   WBC 4.8 5.1 2.9*   HGB 9.6* 10.8* 9.7*   HCT 30.6* 33.5* 30.5*    159 141     Recent Labs     19  0530 19  0602 19  0628    142 141   K 4.5 4.3 4.2   CL 99 104 103   CO2 26 25 28   BUN 39* 38* 40*   CREATININE 1.2* 1.3* 1.4*   CALCIUM 9.6 10.2 9.8   PHOS 2.6 2.6 2.7     Recent Labs     19  0530 19  0602 19  0628   AST 23 24 18   ALT 17 20 17   BILITOT 0.4 0.5 0.4   ALKPHOS 59 62 54     No results for input(s): INR in the last 72 hours. No results for input(s): Kathlee Hylan in the last 72 hours. Imaging:  XR CHEST PORTABLE   Final Result      1. Increased bibasilar opacities. Differential includes atelectasis,   infection, and/or layering pleural effusions.

## 2019-08-19 NOTE — PROGRESS NOTES
Patient is complaining of pain in her buttocks and requesting cream for her hemorrhoids. Patient also had a drop in her white count today. Message sent to Dr. Dionisio Silva via perfect serve addressing these concerns/requests.

## 2019-08-19 NOTE — PROGRESS NOTES
Patient had a period of accelerated juctional rhythm, did not sustain. Dr. Rajan Presser notified via perfect serve.

## 2019-08-20 NOTE — CONSULTS
CARDIOLOGY CONSULTATION    Patient Name:  Rashad King    :  1945    Reason for Consultation:   Atrial fibrillation with rapid ventricular response    History of Present Illness:   Rashad King presents to [address] [facility] with recent history of being found unresponsive in bed by her caretaker. She has no other knowledge of her presenting history. She does have significant COPD and was recently treated in 2018 with CVA. No apparent documented history of atrial fibrillation. She denies any chest discomfort nor is she aware of any palpitations and while she denies dyspnea, she is actively short of breath with conversation. She has no recent history of gastrointestinal or intracerebral bleed. She now presents for further diagnostic evaluation and intervention. Of note, she also has chronic renal insufficiency stage III. Patient known to Dr. Garcia Mention her cardiologist but she has not followed up in several years following her successful coronary bypass surgery. Past Medical History:   has a past medical history of Arthritis, CAD (coronary artery disease), CHF (congestive heart failure) ([address] COPD (chronic obstructive pulmonary disease) ([address] Diabetes ([address] Diabetic ulcer of right calf associated with type 2 diabetes mellitus, with fat layer exposed ([address] HTN (hypertension), Hyperlipidemia, Osteomyelitis ([address] Postoperative anemia due to acute blood loss, Pseudoaneurysm of left femoral artery (HCC), Thigh hematoma, left, initial encounter, and Thyroid disease. Surgical History:   has a past surgical history that includes Coronary artery bypass graft (N/A, May 22, 2013); Cardiac surgery; joint replacement; Hysterectomy; Cholecystectomy; Colonoscopy; Endoscopy, colon, diagnostic; and Abdomen surgery. Social History:   reports that she has been smoking. She has a 110.00 pack-year smoking history.  She has quit using smokeless tobacco. She reports that she does not drink alcohol or mouth every 30 days   Yes Historical Provider, MD   sitaGLIPtin (JANUVIA) 50 MG tablet Take 50 mg by mouth daily   Yes Historical Provider, MD   fenofibrate (TRICOR) 145 MG tablet Take 145 mg by mouth daily   Yes Historical Provider, MD   calcium-vitamin D (OSCAL-500) 500-200 MG-UNIT per tablet Take 1 tablet by mouth 2 times daily. Yes Historical Provider, MD   citalopram (CELEXA) 20 MG tablet Take 20 mg by mouth nightly. Yes Historical Provider, MD   pantoprazole (PROTONIX) 40 MG tablet Take 40 mg by mouth daily. Yes Historical Provider, MD   Roflumilast (DALIRESP) 500 MCG tablet Take 1 tablet by mouth daily 12/18/18   Miguelito Parent,    albuterol sulfate HFA (PROAIR HFA) 108 (90 Base) MCG/ACT inhaler Inhale 2 puffs into the lungs every 6 hours as needed for Wheezing or Shortness of Breath /// Same as Ventolin(Blue) Inhaler 11/26/18   Amrita Doll MD       Allergies:  Patient has no known allergies. Review of Systems:   · Constitutional: there has been no unanticipated weight loss. There's been no significant change in energy level, sleep pattern or activity level. No fever chills or rigors. · Eyes: No visual changes or diplopia. No scleral icterus. · ENT: No Headaches, hearing loss or vertigo. No mouth sores or sore throat. No change in taste or smell. · Cardiovascular: No chest discomfort, + dyspnea on exertion, palpitations, loss of consciousness, no phlebitis, no claudication, + feet cold and irregular heart irina  · Respiratory: + cough and wheezing, no sputum production. No hemoptysis, pleuritic pain. · Gastrointestinal: No abdominal pain, appetite loss, blood in stools. No change in bowel habits. No hematemesis  · Genitourinary: No dysuria, trouble voiding or hematuria. No nocturia or increased frequency. · Musculoskeletal:  No gait disturbance, weakness or joint complaints. · Integumentary: No rash or pruritis.   · Neurological: No headache, diplopia, change in muscle strength, numbness or tingling. No change in gait, balance, coordination, mood, affect, memory, mentation, behavior. · Psychiatric: No anxiety or depression. · Endocrine: No temperature intolerance. No excessive thirst, fluid intake, or urination. No tremor. · Hematologic/Lymphatic: No abnormal bruising or bleeding, blood clots or swollen lymph nodes. · Allergic/Immunologic: No nasal congestion or hives. Physical Examination:    Vital Signs: BP (!) 152/78   Pulse 148   Temp 99 °F (37.2 °C) (Temporal)   Resp 20   Ht 5' 1\" (1.549 m)   Wt 138 lb 8 oz (62.8 kg)   SpO2 96%   BMI 26.17 kg/m²   General appearance: Well preserved, mesomorphic body habitus, alert, no distress. Skin: Skin color, texture, turgor normal. No rashes or lesions. No induration or tightening palpated. Head: Normocephalic. No masses, lesions, tenderness or abnormalities  Eyes: Conjunctivae/corneas clear. PERRL, EOMs intact. Sclera non icteric. Ears: External ears normal. Canals clear. TM's clear bilaterally. Hearing normal to finger rub. Nose/Sinuses: Nares normal. Septum midline. Mucosa normal. No drainage or sinus tenderness. Oropharynx: Lips, mucosa, and tongue normal. Oropharynx clear with no exudate seen. Neck: Neck supple and symmetric. No adenopathy. Thyroid symmetric, normal size, without nodules. Trachea is midline. Carotids brisk in upstroke without bruits, no abnormal JVP noted at 45°. Chest: Even excursion  Lungs: Lungs expiratory rhonchi B/L. No retractions or use of accessory muscles. No tactile vocal fremitus. No crackles or rales. Heart:  S1 > S2. Irregular, irregular rhythm. No gallop or murmur. No rub, palpable thrill or heave noted. PMI 5th intercostal space midclavicular line. Abdomen: Abdomen soft, moderately protuberant, non-tender. BS normal. No masses, organomegaly. No hernia noted. Extremities: Extremities normal. No deformities, edema, or skin discoloration. No cyanosis or clubbing noted to the nails.  Peripheral pulses NUMBER OF IMAGES:  1 INDICATION:  fever fever COMPARISON: July 14, 2018  RESULT: Lines, tubes, and devices:  None. Lungs and pleura: There are multifocal consolidations in the bilateral lungs, most notably within the right perihilar region, concerning for infection. No large pleural effusion or pneumothorax. Cardiomediastinal silhouette:  Normal heart size. Other:  No acute osseous abnormality. Degenerative changes of the spine. Multifocal consolidations in bilateral lungs, concerning for pneumonia. Follow-up to resolution is recommended. Assessment:    Principal Problem:    Sepsis ([address]  Active Problems:    Acute on chronic respiratory failure with hypoxia and hypercapnia (HCC)    Type 2 diabetes mellitus with renal complication (HCC)    HTN (hypertension)    HLD (hyperlipidemia)    COPD mixed type (HCC)  Resolved Problems:    * No resolved hospital problems. *      Plan:  Based upon Ms. Heredia clinical presentation and background including stroke and renal insufficiency, will utilize amiodarone and apixaban with the hope of subsequent conversion to sinus rhythm. Likewise need to address pulmonary issues and she has been counseled on smoking cessation once again. I have specifically warned her of the potential risks for continued smoking. Unfortunately, due to her renal dysfunction, I am hesitant to utilize dofetilide presently. I have spent more than 48 minutes face to face with Clementine Wolfe reviewing notes and laboratory data with greater than 50% of this time instructing and counseling the patient regarding my findings and recommendations and I have answered all questions as posed to me by Ms. Heredia. Thank you, Mauro Sheehan DO and Maikel Beyer DO for allowing me to consult in the care of this patient. Carol Gurrola DO, FACP, FACC, [facility]    NOTE:  This report was transcribed using voice recognition software.   Every effort was made to ensure accuracy; however, inadvertent computerized

## 2019-08-20 NOTE — PROGRESS NOTES
Constitutional: Alert, not in distress  Respiratory: Clear breath sounds, no crackles, no wheezes  Cardiovascular: Regular rate and rhythm, no murmurs  Gastrointestinal: Bowel sounds present, soft, nontender  Skin: Warm and dry, no active dermatoses  Musculoskeletal: No joint swelling, no joint erythema. On exam on 08/16: Right medial knee with violaceous skin patch. Left medial knee with ruptured bulla. Partial-thickness ulcer on the posteromedial right lower leg with healthy-looking wound base with no surrounding necrosis or active purulence. Labs, imaging, and medical records/notes were personally reviewed. Assessment:  Coagulase-negative Staphylococci on blood culture, deemed contaminant  Aspiration pneumonia vs pneumonitis    Recommendations:  Change piperacillin-tazobactam to amoxicillin-clavulanate 875-125 mg BID to complete 7 days of antibiotic therapy from 08/16-08/22. Start Bactrim DS 2 tab BID for 7 days from 08/19-08/25. Follow-up blood cultures from 08/15 and from 08/16. Thank you for involving me in the care of Kamila Ramirez. I will continue to follow. Please do not hesitate to call for any questions or concerns.     Electronically signed by Jeremy Lai MD on 8/19/2019 at 8:26 PM

## 2019-08-21 NOTE — PROGRESS NOTES
development of POC/Goals.)      Assessment of current deficits   Functional mobility [x]  ADLs [x] Strength [x]  Cognition []  Functional transfers  [x] IADLs [x] Safety Awareness [x]  Endurance [x]  Fine Motor Coordination [] Balance [x] Vision/perception [] Sensation []   Gross Motor Coordination [] ROM [] Delirium []                  Motor Control []    Plan of Care:   ADL retraining [x]   Equipment needs [x]   Neuromuscular re-education [x] Energy Conservation Techniques [x]  Functional Transfer training [x] Patient and/or Family Education [x]  Functional Mobility training [x]  Environmental Modifications [x]  Cognitive re-training []   Compensatory techniques for ADLs [x]  Splinting Needs []   Positioning to improve overall function [x]   Therapeutic Activity [x]  Therapeutic Exercise  [x]  Visual/Perceptual: []    Delirium prevention/treatment  []   Other:  []    Rehab Potential: Good for established goals     Patient / Family Goal: Not stated     Patient and/or family were instructed diagnosis, prognosis/goals and plan of care. Demonstrated fair understanding. [] Malnutrition indicators have been identified and nursing has been notified to ensure a dietitian consult is ordered.      AM-PAC Daily Activity Inpatient   How much help for putting on and taking off regular lower body clothing?: A Lot  How much help for Bathing?: A Lot  How much help for Toileting?: A Lot  How much help for putting on and taking off regular upper body clothing?: A Little  How much help for taking care of personal grooming?: A Little  How much help for eating meals?: None  AM-PAC Inpatient Daily Activity Raw Score: 16  AM-PAC Inpatient ADL T-Scale Score : 35.96  ADL Inpatient CMS 0-100% Score: 53.32  ADL Inpatient CMS G-Code Modifier : CK     mod Evaluation + 27 timed treatment minutes  Tx Time in: 947  Tx Time out: [address] [facility] OTR/L #[id number]

## 2019-08-21 NOTE — PLAN OF CARE
Problem: Venous Thromboembolism:  Goal: Will show no signs or symptoms of venous thromboembolism  Description  Will show no signs or symptoms of venous thromboembolism  Outcome: Met This Shift     Problem: Pain:  Goal: Pain level will decrease  Description  Pain level will decrease  Outcome: Met This Shift     Problem: Pain:  Goal: Control of acute pain  Description  Control of acute pain  Outcome: Met This Shift     Problem: Falls - Risk of:  Goal: Will remain free from falls  Description  Will remain free from falls  Outcome: Met This Shift

## 2019-08-21 NOTE — PROGRESS NOTES
DANIEL PROGRESS NOTE      Chief complaint: Follow-up of coagulase-negative Staphylococcus in blood culture    The patient is a 75 y.o. female with history of PAD s/p endovascular intervention in 04/2019, DM, CHF, hypertension, hyperlipidemia, COPD, CAD, bilateral TKA, presented on 08/14 for hypoglycemia after she was noted to be lethargic by her roommate and found to have blood glucose level of 54 and T-max of 100.3 °F. On admission, she was afebrile and hemodynamically stable with no leukocytosis. Chest x-ray showed multifocal consolidations in bilateral lungs most notably within the right perihilar region with no large pleural effusion or pneumothorax. CT chest without contrast showed confluent opacities in the right upper lobe and within the right middle lobe. Respiratory pathogen PCR panel and urine Legionella and Streptococcus pneumoniae antigens were negative. Superficial wound swab culture from leg showed rare growth of Staphylococcus aureus and coagulase-negative staphylococci. Urinalysis showed pyuria of 5-10 with urine culture showing gram-negative rods pending identification. Blood cultures showed coagulase-negative Staphylococci in 1 of 2 sets. Sputum Gram stain showed <25 PMNs and <25 epithelial cells/LPF, rare polymorphonuclear leukocytes, rare Gram-positive cocci in clusters and few gram-positive cocci in pairs, light growth of Gram-negative malinda identified as Stenotrophomonas maltophilia (susceptible to cotrimoxazole). Piperacillin-tazobactam and vancomycin were started on admission. Blood cultures from 08/16 showed no growth to date. Subjective: Patient was seen and examined. No chills, no abdominal pain, no diarrhea, no rash, no itching, has cough. She reports coughing more today.     Objective:  /70   Pulse 94   Temp 98.1 °F (36.7 °C) (Temporal)   Resp 16   Ht 5' 1\" (1.549 m)   Wt 139 lb 9.6 oz (63.3 kg)   SpO2 94%   BMI 26.38 kg/m²   Constitutional: Alert, not in

## 2019-08-21 NOTE — PROGRESS NOTES
Hospitalist Progress Note      PCP: Desire Moncada DO    Date of Admission: 8/14/2019  Days in the hospital: 6    Chief Complaint: shortness of breath    Hospital Course:     Seen by pulmonology. Placed on bipap initially. Pulmonology will attempt to get a trilogy set up for the patient at home. Respiratory film assay negative. Now on NC O2. Continued on vanc and zosyn. Infectious disease consulted due to gram positive bacteremia. Transferred from ICU to [facility]    Subjective  Patient seen and examined at bedside.   Her breathing continues to improve  Cardio consulted yesterday for A fib - new onset      Medications:  Reviewed    Infusion Medications    dextrose       Scheduled Medications    amiodarone  200 mg Oral BID    nadolol  20 mg Oral BID    apixaban  5 mg Oral Q12H    hydrALAZINE  50 mg Oral 3 times per day    amoxicillin-clavulanate  1 tablet Oral 2 times per day    sulfamethoxazole-trimethoprim  2 tablet Oral 2 times per day    furosemide  20 mg Oral Daily    citalopram  20 mg Oral Nightly    methylPREDNISolone  40 mg Intravenous Q12H    insulin lispro  0-6 Units Subcutaneous TID     insulin lispro  0-3 Units Subcutaneous Nightly    diltiazem  180 mg Oral Daily    sodium chloride flush  10 mL Intravenous 2 times per day    formoterol  20 mcg Nebulization BID    budesonide  250 mcg Nebulization BID    pantoprazole  40 mg Intravenous Daily    levothyroxine  137 mcg Oral Daily    clopidogrel  75 mg Oral Daily    ipratropium-albuterol  1 ampule Inhalation Q4H WA     PRN Meds: oxyCODONE-acetaminophen, diphenhydrAMINE, phenylephrine-mineral oil-petrolatum, hydrALAZINE, acetaminophen, melatonin, sodium chloride flush, magnesium hydroxide, ondansetron, glucose, dextrose, glucagon (rDNA), dextrose      Intake/Output Summary (Last 24 hours) at 8/21/2019 0943  Last data filed at 8/21/2019 5977  Gross per 24 hour   Intake 1040 ml   Output 1550 ml   Net -510 ml       Exam:    /85

## 2019-08-22 NOTE — PROGRESS NOTES
VOLUME:  1. Slow vital capacity 1.42, which is 65 of predicted. 2.  ERV is 1.34, which is 93 of predicted. 3.  RV is 2.99, which is 150 of predicted. 4.  RV/TLC 68, which is 150 of predicted. 5.  Diffusion capacity 4.29, which is 26% of predicted and corrects for  alveolar volume to 57ed PFT             IMPRESSION:    *-Acute Hypercapnic /hypoxic, respiratory failure  *-Acute encephalopathy with hypoglycemia  *-COPD ,Severe ,FEV1 43 %  *-mediastinal adenopathy with Right LLL atelectasis   *-recent h/o CVA and CAD,s/p CABG   *-Lung nodule s            *- nicotine dependences   * Pneumonia     PLAN:          Antibiotic as per ID awaiting final culture and sensitivity,now on PO   Wean steroids gradually ,change PO in am, please wean 40 mg for 3 days and then 30 mg another 3 days and then 20 mg  another 3 days and then finally 10 mg for 3 days  On  trelegy inhaler   Legionella has been negative  Grow strep and stenotrophomonas   Already in the process to get Trilogy (NIMV) to use at home to help her breathing and COPD and reduce admission        Sincerely,        Shazia Crawley MD  Pulmonary & Critical Care Medicine     NOTE: This report was transcribed using voice recognition software. Every effort was made to ensure accuracy; however, inadvertent computerized transcription errors may be present.

## 2019-08-22 NOTE — DISCHARGE SUMMARY
and hypercapnic respiratory failure  Sepsis  COPD, mixed type  Hypertension  Hyperlipidemia  Type 2 diabetes with renal complication  Atrial fibrillation with RVR, RVR resolved  Coagulase-negative Staphylococci on blood culture, deemed contaminant  Aspiration pneumonia vs pneumonitis    Discharge Instructions / Follow up: Follow-up with PCP within 1 week of discharge  FU BMP in 1 week    Activity: activity as tolerated    Significant labs:  CBC:   Recent Labs     08/20/19  0552 08/21/19  0705 08/22/19  0524   WBC 3.3* 5.9 4.8   RBC 3.31* 3.55 3.69   HGB 9.6* 10.4* 10.8*   HCT 30.7* 32.5* 34.4   MCV 92.7 91.5 93.2   RDW 16.8* 16.7* 16.2*    173 157     BMP:   Recent Labs     08/20/19  0552 08/21/19  0705 08/22/19  0524    135 135   K 4.4 5.3* 5.1*    97* 97*   CO2 26 26 26   BUN 46* 55* 68*   CREATININE 1.4* 1.5* 1.7*   MG 1.9 2.1 2.2   PHOS 2.9 4.1 4.8*     LFT:  Recent Labs     08/20/19  0552 08/21/19  0705 08/22/19  0524   PROT 5.8* 6.1* 6.2*   ALKPHOS 54 54 62   ALT 20 23 23   AST 22 24 19   BILITOT 0.3 0.3 0.2     PT/INR: No results for input(s): INR, APTT in the last 72 hours. BNP: No results for input(s): BNP in the last 72 hours. Hgb A1C:   Lab Results   Component Value Date    LABA1C 4.7 06/14/2019     Folate and B12:   Lab Results   Component Value Date    TEAXCXIV86 211 07/25/2013   , No results found for: FOLATE  Thyroid Studies:   Lab Results   Component Value Date    TSH 0.567 08/15/2019       Urinalysis:    Lab Results   Component Value Date    NITRU POSITIVE 08/15/2019    WBCUA 5-10 08/15/2019    BACTERIA FEW 08/15/2019    RBCUA 1-3 08/15/2019    RBCUA NONE 08/17/2013    BLOODU Negative 08/15/2019    SPECGRAV 1.025 08/15/2019    GLUCOSEU Negative 08/15/2019       Imaging:  Ct Head Wo Contrast    Result Date: 8/15/2019  EXAM: CT Head Without Contrast EXAM DATE/TIME: 8/15/2019 1:30 AM CLINICAL HISTORY: 75years old, female; Altered mental status/memory loss; Additional info:  AMS plan.    +++++++++++++++++++++++++++++++++++++++++++++++++  Krista King MD, Hospitalist  +++++++++++++++++++++++++++++++++++++++++++++++++  NOTE: This report was transcribed using voice recognition software. Every effort was made to ensure accuracy; however, inadvertent computerized transcription errors may be present.

## 2019-08-22 NOTE — PROGRESS NOTES
Spoke with Dr. Miguel Spaulding while on unit who states that the pt is okay for discharge on current medications.

## 2019-08-23 NOTE — PROGRESS NOTES
PROGRESS NOTE       PATIENT PROBLEM LIST:  Principal Problem:    Sepsis ([address]  Active Problems:    Acute on chronic respiratory failure with hypoxia and hypercapnia (HCC)    Type 2 diabetes mellitus with renal complication (HCC)    HTN (hypertension)    HLD (hyperlipidemia)    COPD mixed type (HCC)  Resolved Problems:    * No resolved hospital problems. *      SUBJECTIVE:  Rhonda Bound states she feels better and is breathing more comfortably presently. She is anxious to be discharged and has been awaiting my arrival.  She is tolerating her change in cardiac medications presently. REVIEW OF SYSTEMS:  General ROS: Spot for - fatigue. Psychological ROS: negative for - anxiety , depression  Ophthalmic ROS: positive for - decreased vision And utilizes corrective lenses for visual acuity. ENT ROS: negative  Allergy and Immunology ROS: negative  Hematological and Lymphatic ROS: negative  Endocrine: no heat or cold intolerance and no polyphagia, polydipsia, or polyuria  Respiratory ROS: positive for - cough, shortness of breath, sputum changes and wheezing  Cardiovascular ROS: positive for - dyspnea on exertion, irregular heartbeat, rapid heart rate and shortness of breath. Gastrointestinal ROS: no abdominal pain, change in bowel habits, or black or bloody stools  Genito-Urinary ROS: no nocturia, dysuria, trouble voiding, frequency or hematuria  Musculoskeletal ROS: negative for- myalgias, arthralgias, or claudication  Neurological ROS: no TIA or stroke symptoms otherwise no significant change in symptoms or problems since yesterday as documented in previous progress notes.     SCHEDULED MEDICATIONS:      VITAL SIGNS:                                                                                                                          BP (!) 142/73   Pulse 71   Temp 97.2 °F (36.2 °C) (Temporal)   Resp 18   Ht 5' 1\" (1.549 m)   Wt 137 lb 4.8 oz (62.3 kg)   SpO2 96%   BMI 25.94 kg/m²   Patient Vitals for the INDICATION: Pneumonia TECHNIQUE:  Low-dose CT acquisition technique included one of the following options; 1. Automated exposure control, 2. Adjustment of mA and/or kV according to the patient's size or 3. Use of iterative reconstruction. FINDINGS: There are confluent opacities in right upper lobe as well as opacities within right middle lobe. Areas of scarring and subsegmental atelectasis is seen in the lung bases. There is hyperinflation of both lungs. No evidence of pneumothorax. The heart is normal in size. Atherosclerotic calcifications are associated with the coronary arteries. No evidence of mediastinal or hilar lymphadenopathy. View of the upper abdomen shows grossly normal bilateral adrenal glands. There is a bowel containing ventral abdominal wall hernia partially visualized. 1. New opacities present within right upper lobe may indicate new atelectasis or pneumonia. 2. Areas of subsegmental atelectasis or scarring seen in the lung bases. 3. Hyperinflation of both lungs might suggest chronic obstructive pulmonary disease. Clinical correlation recommended. 4. Atherosclerotic disease associated with the coronary arteries. 5. Partial visualization of bowel containing ventral abdominal wall hernia. Xr Chest Portable    Result Date: 2019  Patient MRN:  [id number] : 1945 Age: 75 years Gender: Female Order Date:  2019 10:45 AM EXAM: XR CHEST PORTABLE NUMBER OF IMAGES:   1  INDICATION:  SOB SOB COMPARISON: 2019 TECHNIQUE: AP view of the chest. FINDINGS: Stable cardiomegaly and pulmonary vascular congestion. There are increased bibasilar opacities. Opacity previously noted in the right midlung has resolved. No pneumothorax seen. Osseous structures are intact. 1. Increased bibasilar opacities. Differential includes atelectasis, infection, and/or layering pleural effusions. 2. Opacity previously noted in the right midlung has resolved. 3. Stable pulmonary vascular congestion.

## 2019-08-24 PROBLEM — R26.2 AMBULATORY DYSFUNCTION: Status: ACTIVE | Noted: 2019-01-01

## 2019-08-24 NOTE — ED PROVIDER NOTES
scar   Abdominal: Soft. Bowel sounds are normal. There is no tenderness. There is no rebound and no guarding. multiple surgical scars, soft reducible hernia. Musculoskeletal: She exhibits no edema. Neurological: She is alert and oriented to person, place, and time. No cranial nerve deficit or sensory deficit. She exhibits normal muscle tone. Coordination normal.   Skin: Skin is warm and dry. Dressing to right calf   Nursing note and vitals reviewed. Procedures     [facility]       --------------------------------------------- PAST HISTORY ---------------------------------------------  Past Medical History:  has a past medical history of Arthritis, CAD (coronary artery disease), CHF (congestive heart failure) ([facility] [address] COPD (chronic obstructive pulmonary disease) ([facility] [address] Diabetes ([facility] [address] Diabetic ulcer of right calf associated with type 2 diabetes mellitus, with fat layer exposed ([address] HTN (hypertension), Hyperlipidemia, Osteomyelitis ([address] Postoperative anemia due to acute blood loss, Pseudoaneurysm of left femoral artery (HCC), Thigh hematoma, left, initial encounter, and Thyroid disease. Past Surgical History:  has a past surgical history that includes Coronary artery bypass graft (N/A, May 22, 2013); Cardiac surgery; joint replacement; Hysterectomy; Cholecystectomy; Colonoscopy; Endoscopy, colon, diagnostic; and Abdomen surgery. Social History:  reports that she has been smoking. She has a 110.00 pack-year smoking history. She has quit using smokeless tobacco. She reports that she does not drink alcohol or use drugs. Family History: family history includes COPD in her father and sister; Cancer in her father and sister; Heart Attack in her father and mother; Hypertension in her mother; Other in her brother. The patients home medications have been reviewed. Allergies: Patient has no known allergies.     -------------------------------------------------- RESULTS -------------------------------------------------  Labs:  No results found for this visit on 08/24/19. Radiology:  No orders to display       ------------------------- NURSING NOTES AND VITALS REVIEWED ---------------------------  Date / Time Roomed:  8/24/2019 12:42 PM  ED Bed Assignment:  18A/18A-18    The nursing notes within the ED encounter and vital signs as below have been reviewed. /64   Pulse 64   Temp 97.3 °F (36.3 °C)   Resp 18   Ht 5' 1\" (1.549 m)   Wt 137 lb (62.1 kg)   BMI 25.89 kg/m²   Oxygen Saturation Interpretation: Normal      ------------------------------------------ PROGRESS NOTES ------------------------------------------    ED Course as of Aug 27 1012   Sat Aug 24, 2019   1459 Discussed case with Dr. Tran Dunlap , he will accept patient    [MF]      ED Course User Index  [] Angel Caputo, DO     No acute process found today on work-up the patient is clearly unable to take care of herself at home condition will be admitted for possible placement. I have spoken with the patient and discussed todays results, in addition to providing specific details for the plan of care and counseling regarding the diagnosis and prognosis. Their questions are answered at this time and they are agreeable with the plan  of admission.      --------------------------------- ADDITIONAL PROVIDER NOTES ---------------------------------        Diagnosis:  1. Generalized weakness        Disposition:  Patient's disposition: admit  Patient's condition is stable. NOTE: This report was transcribed using voice recognition software.  Every effort was made to ensure accuracy; however, inadvertent computerized transcription errors may be present           Angel Caputo DO  Resident  08/27/19 1013

## 2019-08-24 NOTE — H&P
Hospital Medicine History & Physical      PCP: Shankar Wolff DO    Date of Admission: 8/24/2019    Date of Service: Pt seen/examined on 08/24/19 Placed in Observation. Chief Complaint:  Fatigue     History Of Present Illness:  Records reviewed. This is a  75 y.o. female who presented to [address] [place] [address] with fatigue and weakness. Recently admitted for AMS and SOB to MICU. Bipap while here. Pulmonology set a trilogy up for the patient at home, she received today. Continued on vanc and zosyn. Infectious disease consulted for CONS bactremia, deemed contaminant and aspiration pneumonitis. Tx with Augmentin and Bactrim. Discharged with NIMV at home, steroid and anti biotics for remainder of course. patient was discharged 2 days ago. She presented back to the ED for general weakness and fatigue, cannot lift her head up. On arrival, vitals stable, on 4liters nasal cannula. Labs showed K 5.6, Bun/Cr 68/1.9. Elevated BNP. CXR showed possible pleural effusions. Treated with 500 cc NS. Per family, patient's discharged 2 days ago and patient had to be lifted by 2 people from the car into the home. She has been weak and lethargic, confused this morning. She has been weak for the last 2 days at home. She has had poor oral intake.     Past Medical History:          Diagnosis Date    Arthritis     CAD (coronary artery disease)     CHF (congestive heart failure) (HCC)     COPD (chronic obstructive pulmonary disease) ([address]     Diabetes ([address]     Diabetic ulcer of right calf associated with type 2 diabetes mellitus, with fat layer exposed ([address] 3/6/2019    HTN (hypertension)     Hyperlipidemia     Osteomyelitis (HCC)     Postoperative anemia due to acute blood loss 5/1/2019    Pseudoaneurysm of left femoral artery ([address] 5/1/2019    Thigh hematoma, left, initial encounter 5/1/2019    Thyroid disease        Past Surgical History:          Procedure Laterality Date    ABDOMEN SURGERY loratadine (CLARITIN) 10 MG tablet Take 10 mg by mouth daily   Yes Historical Provider, MD   clonazePAM (KLONOPIN) 1 MG tablet Take 1 mg by mouth 2 times daily. Yes Historical Provider, MD   clopidogrel (PLAVIX) 75 MG tablet Take 1 tablet by mouth daily 8/23/16  Yes Sharla Ruby DO   levothyroxine (SYNTHROID) 137 MCG tablet Take 137 mcg by mouth Daily   Yes Historical Provider, MD   Cholecalciferol (VITAMIN D3) 78701 UNITS CAPS Take by mouth every 30 days   Yes Historical Provider, MD   sitaGLIPtin (JANUVIA) 50 MG tablet Take 50 mg by mouth daily   Yes Historical Provider, MD   fenofibrate (TRICOR) 145 MG tablet Take 145 mg by mouth daily   Yes Historical Provider, MD   calcium-vitamin D (OSCAL-500) 500-200 MG-UNIT per tablet Take 1 tablet by mouth 2 times daily. Yes Historical Provider, MD   citalopram (CELEXA) 20 MG tablet Take 20 mg by mouth nightly. Yes Historical Provider, MD   pantoprazole (PROTONIX) 40 MG tablet Take 40 mg by mouth daily. Yes Historical Provider, MD   polyethylene glycol (GLYCOLAX) powder Take 17 g by mouth daily as needed Dissolve 1 packet in liquid daily as needed    Historical Provider, MD   Roflumilast (DALIRESP) 500 MCG tablet Take 1 tablet by mouth daily 12/18/18   Lacie Hess DO       Allergies:  Patient has no known allergies. Social History:      The patient currently lives home     TOBACCO:   reports that she has been smoking. She has a 110.00 pack-year smoking history. She has quit using smokeless tobacco.  ETOH:   reports that she does not drink alcohol. Family History:       Reviewed in detail and negative for DM, CAD, Cancer, CVA. Positive as follows:        Problem Relation Age of Onset    Heart Attack Mother     Hypertension Mother     Heart Attack Father     Cancer Father     COPD Father     Cancer Sister     Other Brother     COPD Sister        REVIEW OF SYSTEMS:   Pertinent positives as noted in the HPI.  All other systems reviewed and negative. PHYSICAL EXAM:    /64   Pulse 64   Temp 97.3 °F (36.3 °C)   Resp 18   Ht 5' 1\" (1.549 m)   Wt 137 lb (62.1 kg)   BMI 25.89 kg/m²     General appearance:  No apparent distress. Patient does wake up and answers questions appropriately  HEENT:  Normal cephalic, atraumatic without obvious deformity. Pupils equal, round, and reactive to light. Extra ocular muscles intact. Conjunctivae/corneas clear. Neck: Supple, with full range of motion. No jugular venous distention. Trachea midline. Respiratory:  Normal respiratory effort. Decreased bases bilaterally  Cardiovascular:  Regular rate and irregular rhythm with normal S1/S2 without murmurs, rubs or gallops. Abdomen: Soft, non-tender, non-distended with normal bowel sounds. Musculoskeletal:  No clubbing, cyanosis or edema bilaterally. Full range of motion without deformity. Skin: Skin color, texture, turgor fair. Noted bruising on bilateral upper arms  Neurologic: Extremities, weak  Psychiatric:  Alert and oriented, thought content appropriate, normal insight  Capillary Refill: Brisk,< 3 seconds   Peripheral Pulses: +2 palpable, equal bilaterally     Xr Chest Portable  Result Date: 8/24/2019  1. Stable, enlarged cardiomediastinal silhouette with underlying central pulmonary vascular congestion and thoracic aortic vascular calcifications. 2. Nonspecific bibasilar airspace disease, findings can be seen in infiltrate/pneumonia and/or atelectasis. . Bilateral pleural effusions. Labs:     Recent Labs     08/22/19  0524 08/24/19  1307   WBC 4.8 7.1   HGB 10.8* 11.1*   HCT 34.4 35.0    159     Recent Labs     08/22/19  0524 08/24/19  1307    136   K 5.1* 5.6*   CL 97* 100   CO2 26 25   BUN 68* 68*   CREATININE 1.7* 1.9*   CALCIUM 9.5 9.1   PHOS 4.8*  --      Recent Labs     08/22/19  0524   AST 19   ALT 23   BILITOT 0.2   ALKPHOS 62     No results for input(s): INR in the last 72 hours.   Recent Labs     08/24/19  1307 TROPONINI 0.02       Urinalysis:      Lab Results   Component Value Date    NITRU Negative 08/24/2019    WBCUA 1-3 08/24/2019    BACTERIA RARE 08/24/2019    RBCUA 1-3 08/24/2019    RBCUA NONE 08/17/2013    BLOODU Negative 08/24/2019    SPECGRAV 1.020 08/24/2019    GLUCOSEU Negative 08/24/2019         ASSESSMENT:    Active Hospital Problems    Diagnosis Date Noted    Ambulatory dysfunction [R26.2] 08/24/2019    Diabetic ulcer of right calf associated with type 2 diabetes mellitus, with fat layer exposed ([address] [U02.026, L97.212] 03/06/2019    Acute kidney injury superimposed on chronic kidney disease ([address] [N17.9, N18.9] 07/14/2018    Type 2 diabetes mellitus with renal complication ([address] [S60.40] 07/14/2018    HTN (hypertension) [I10] 07/14/2018    COPD mixed type ([address] [J44.9] 07/14/2018    CAD (coronary artery disease) [I25.10] 07/14/2018    Tobacco dependence [F17.200] 07/14/2018       PLAN:    Admit to telemetry  Social work/case management for possible placement  PT OT  Continue medications from home  IV Lasix  Repeat and monitor labs  BiPAP as needed  Smoking cessation discussed  ACP discussed, full code at this time    DVT Prophylaxis: Eliquis  Diet: No diet orders on file  Code Status: Prior    PT/OT Eval Status: Ordered    Dispo -telemetry admission       Caren Martinez, AMELIA - CNP    Thank you Steve Thomas DO for the opportunity to be involved in this patient's care.  If you have any questions or concerns please feel free to contact me at [phone]

## 2019-08-24 NOTE — PROGRESS NOTES
Per Stuart Guevara NP with [facility] - will keep pt as orginal admit order; Med Surg- no tele at this time.   Joanne Melissa RN

## 2019-08-24 NOTE — HOME CARE
Patient current with [facility] for SN,PT/OT/SW. Will need ERVIN orders if appropriate at discharge. Louis Beth LPN  [facility].

## 2019-08-24 NOTE — PROGRESS NOTES
Brett Adler was ordered Trelegy inhaler which is a nonformulary medication. The patient has indicated that the home supply of this medication will be brought in to the hospital for inpatient use. If the medication has not been administered by 1400 on the following day from the time the order was placed, a pharmacist will follow-up with the nurse of the patient to assess the capability of the patient to bring in the medication. If it is determined that the patient cannot supply the medication and it is not available to be dispensed from the pharmacy, a call will be placed to the ordering provider to discuss alternative options.

## 2019-08-24 NOTE — PROGRESS NOTES
ADVANCED CARE PLANNING    Name:Mitzi Heredia       :  1945              MRN:  [id number]      Purpose of Encounter: Advanced care planning in light of acute on chronic respiratory failure, lethargy and fatigue  Parties in attendance: :AMELIA Armenta CNP, Family members: Daughter-in-law and son at bedside. Decisional Capacity:Yes    Diagnosis: Active Problems:    Acute kidney injury superimposed on chronic kidney disease (HCC)    Type 2 diabetes mellitus with renal complication (HCC)    HTN (hypertension)    COPD mixed type (HCC)    CAD (coronary artery disease)    Tobacco dependence    Diabetic ulcer of right calf associated with type 2 diabetes mellitus, with fat layer exposed ([facility] [address]    Ambulatory dysfunction  Resolved Problems:    * No resolved hospital problems. *    Patients Medical Story: This is a  75 y.o. female who presented to [address] [place] [address] with fatigue and weakness. Recently admitted for AMS and SOB to MICU. Bipap while here. Pulmonology set a trilogy up for the patient at home, she received today. Continued on vanc and zosyn. Infectious disease consulted for CONS bactremia, deemed contaminant and aspiration pneumonitis. Tx with Augmentin and Bactrim. Discharged with NIMV at home, steroid and anti biotics for remainder of course. patient was discharged 2 days ago. She presented back to the ED for general weakness and fatigue. She was placed on home noninvasive ventilator prior to admission and her mentation and fatigue did improve slightly. She presented to the emergency room for likely placement and rehabilitation. She continue to smoke at home and does not where oxygen all the time at home. Discussed about intubation, patient admits wants to be full code at this time.  Will need continued discussion as patient asked to think about this and states that this has not been discussed in the past.     Goals of Care Determinations: Patient wishes to focus on breathing better and getting stronger. Plan: Will notify José Luis Wilson DO of change in care plan. Will look at further interventions as needed. Code Status: At this time patient wishes to be Full Code  Time Spent with Patient: 35 minutes      Electronically signed by AMELIA Valdez CNP on 8/24/2019 at 4:43 PM  Thank you José Luis Wilson DO for the opportunity to be involved in this patient's care.

## 2019-08-24 NOTE — ED NOTES
Bed: 18A-18  Expected date:   Expected time:   Means of arrival:   Comments:  EMS     Amy Score, RN  08/24/19 8030

## 2019-08-25 NOTE — H&P
[facility] Hospitalist Attending Attestation     Principal Problem:    Ambulatory dysfunction  Active Problems:    Acute kidney injury superimposed on chronic kidney disease ([address]    Type 2 diabetes mellitus with renal complication (HCC)    HTN (hypertension)    HLD (hyperlipidemia)    COPD mixed type (HCC)    CAD (coronary artery disease)    Hypothyroidism    Tobacco dependence    Diabetic ulcer of right calf associated with type 2 diabetes mellitus, with fat layer exposed ([address]    PVD (peripheral vascular disease) ([facility] [address]  Resolved Problems:    * No resolved hospital problems. *          Attending Physician Statement  I have reviewed the chart, including any radiology or labs, and have seen the patient with the PA/CNP/APRN (s). I personally reviewed and performed key elements of the history and exam.  I agree with the assessment, plan and orders as documented by the PA/CNP/APRN (s). Please refer to the physician extender note for additional information.        Electronically signed by Pavel Burgos MD on 8/25/2019 at 8:07 AM

## 2019-08-25 NOTE — PROGRESS NOTES
Max A      Activity Tolerance Poor - pt complains of fatigue with light activity   Educate pt on energy conservation and pacing techniques to utilize during ADL's    Visual/  Perceptual Glasses: wears glasses all the time                 Hand dominance: right   UE ROM: RUE:  WFL  LUE:  WFL  Strength: RUE: grossly 3+/5 LUE: grossly 3+/5   Strength: B WFL  Fine Motor Coordination:  WFL    Hearing: WFL  Sensation:  No c/o numbness or tingling  Tone:  WFL  Edema: none in BUE's                             Comments/Treatment: Upon arrival, patient was returning off of [facility] with PT present . Pt was agreeable to OT eval . Pt completed toileting and was assisted back to edge of bed. Pt educated on deep breathing techniques . At end of session, patient placed supine in bed  with call light and phone within reach, all lines and tubes intact. Pt would benefit from continued skilled OT to increase functional independence and quality of life.     Eval Complexity: Low    Assessment of current deficits  Functional mobility [x]  ADLs [x] Strength [x]  Cognition []  Functional transfers  [x] IADLs [x] Safety Awareness []  Endurance [x]  Fine Motor Coordination [] Balance [x] Vision/perception [] Sensation []   Gross Motor Coordination [] ROM [] Delirium []                  Motor Control []    Plan of Care:   ADL retraining [x]   Equipment needs []   Neuromuscular re-education [] Energy Conservation Techniques [x]  Functional Transfer training [x] Patient and/or Family Education [x]  Functional Mobility training [x]  Environmental Modifications []  Cognitive re-training []   Compensatory techniques for ADLs [x]  Splinting Needs []   Positioning to improve overall function []   Therapeutic Activity [x]  Therapeutic Exercise  [x]  Visual/Perceptual: []    Delirium prevention/treatment  []   Other:  []    Rehab Potential: Good for established goals    Patient / Family Goal: I need to get stronger     Patient and/or family were instructed diagnosis, prognosis/goals and plan of care. Demonstrated good  understanding. [] Malnutrition indicators have been identified and nursing has been notified to ensure a dietitian consult is ordered.       Low level Evaluation       Lázaro Fung OTR/L [id number]

## 2019-08-25 NOTE — PROGRESS NOTES
Nutrition Assessment    Type and Reason for Visit: Initial, Positive Nutrition Screen    Nutrition Recommendations: Continue current diet, Start Glucerna BID    Nutrition Assessment: Pt appears adequately nourished though currently at nuritional risk d/t increased nutrient needs for wound healing. Will provide ONS and monitor    Malnutrition Assessment:  · Malnutrition Status: No malnutrition  · Context: Acute illness or injury  · Findings of the 6 clinical characteristics of malnutrition (Minimum of 2 out of 6 clinical characteristics is required to make the diagnosis of moderate or severe Protein Calorie Malnutrition based on AND/ASPEN Guidelines):  1. Energy Intake-Less than or equal to 75% of estimated energy requirement, (x1 day since adm)    2. Weight Loss-No significant weight loss    3. Fat Loss-No significant subcutaneous fat loss    4. Muscle Loss-No significant muscle mass loss    5. Fluid Accumulation-No significant fluid accumulation    6.   Strength-Not measured    Nutrition Risk Level: Low    Nutrient Needs:  · Estimated Daily Total Kcal: (MSJ 1147 x 1.2 SF)  · Estimated Daily Protein (g): 60-70(1.3-1.5 gm/kg IBW)  · Estimated Daily Total Fluid (ml/day):     Nutrition Diagnosis:   · Problem: Increased nutrient needs  · Etiology: related to Increased demand for energy/nutrients     Signs and symptoms:  as evidenced by Presence of wounds    Objective Information:  · Nutrition-Focused Physical Findings: pt alert uninterested in nutrition assessment, missing teeth, abd WDL, periorbital edema, -I/Os  · Wound Type: Multiple, Open Wounds  · Current Nutrition Therapies:  · Oral Diet Orders: Carb Control 4 Carbs/Meal   · Oral Diet intake: 51-75%  · Oral Nutrition Supplement (ONS) Orders: None  · Anthropometric Measures:  · Ht: 5' 1\" (154.9 cm)   · Current Body Wt: 158 lb (71.7 kg)(bed scale 8/25)  · Admission Body Wt: 155 lb (70.3 kg)(bed scale 8/24)  · Usual Body Wt: 151 lb (68.5 kg)(measured last adm 8/16/19)  · % Weight Change:   noted wt gain since adm w/ current fluids + at this time  · Ideal Body Wt: 105 lb (47.6 kg), % Ideal Body 148%  · BMI Classification: BMI 25.0 - 29.9 Overweight    Nutrition Interventions:   Continued Inpatient Monitoring, Education declined, Coordination of Care    Nutrition Evaluation:   · Evaluation: Goals set   · Goals: Consume >75% meals/ONS    · Monitoring: Meal Intake, Supplement Intake, Diet Tolerance, Skin Integrity, Wound Healing, I&O, Mental Status/Confusion, Weight, Pertinent Labs, Monitor Bowel Function      Electronically signed by Malcolm Jordan, MS, RD, LD on 8/25/19 at 4:19 PM    Contact Number: [phone]

## 2019-08-25 NOTE — PLAN OF CARE
Problem: Increased nutrient needs (NI-5.1)  Goal: Food and/or Nutrient Delivery  Description: Glucerna BID  Individualized approach for food/nutrient provision.   Outcome: Met This Shift

## 2019-08-25 NOTE — PROGRESS NOTES
Physical Therapy    Initial Assessment     Name: Lisa Nichole  : 1945  MRN: [id number]    Date of Service: 2019    Evaluating PT:  Faustino Funez PT, DPT [id number]    Room #:  1584/0517-P  Diagnosis:  Ambulatory dysfunction  PMHx:  Arthritis, CAD, CHF,COPD, DM, diabetic ulcer, HTN HLD, osteomyelitis, pseudoaneurysm of L femoral artery, L thigh hematoma, thyroid disease   Precautions:  Falls, O2  Equipment Needs:  WC    Pt lives with friend/caregiver in a 1 story mobile home with 3 stairs to enter and 1 rail(s). Bedroom and bathroom are on the main level with 1 additional step. Pt ambulated with [employer] PTA. Pt on 5 L at home. Pt has been mostly non-ambulatory since recent hospital admission. Initial Evaluation  Date: 19 Treatment Short Term/ Long Term   Goals   AM-PAC 6 Clicks 62/74     Was pt agreeable to Eval/treatment? Yes     Does pt have pain? No c/o pain      Bed Mobility  Rolling: NT  Supine to sit: NT  Sit to supine: Max A  Scooting: Max A  Min A   Transfers Sit to stand: Max A  Stand to sit: Max A  Stand pivot: Max A with [employer]  Sit to stand: Min A  Stand to sit: Min A  Stand pivot: Min A with [employer]   Ambulation    Few steps back to bed with [employer] Max A  >50 feet with [employer] Min A   Stair negotiation: ascended and descended  NT  >3 steps with 1 rail Mod A   ROM BUE:  Per OT eval   BLE:  WFL     Strength BUE:  Per OT eval   BLE:  Grossly > 3/5      Balance Sitting EOB:  Min A  Dynamic Standing:   Max A with [employer]  Sitting EOB:  SBA  Dynamic Standing:  Min A     Pt is A & O x 3  Sensation:  Pt denies numbness and tingling to extremities  Edema:  Unremarkable     Patient education  Pt educated on PT role, safety during functional mobility, hand placement during sit<>stand, [employer] approx/negotiation, upright standing posture     Patient response to education:   Pt verbalized understanding Pt demonstrated skill Pt requires further education in this area   Yes  With cues  Yes      Comments:  Pt received on commode and agreeable to PT evaluation. STS performed from commode and pt assisted with hygiene. Poor standing balance, posture, and tolerance noted. Pt has posterior lean causing her to fall backwards when not being assisted. Pt tolerated standing <1 minute and needed to return to sitting. Requires safety cueing for hand placement sit<>stand. Requires cues and physical assist to correct posture. Second STS from commode and pt assisted with hygiene and performed stand pivot back to bed. Pt displays poor balance and gross weakness during ambulation back to bed. Assisted back to supine. Placed in semi-chair position. Pt left with call button in reach, lines attached, and needs met. Pt would benefit from continued PT services at discharge. Pt is at risk for future falls and re-admissions if she is to return home. Pts/ family goals   1. Home with [place] [id number] vs Rehab     Patient and or family understand(s) diagnosis, prognosis, and plan of care. Yes     PLAN  PT care will be provided in accordance with the objectives noted above. Whenever appropriate, clear delegation orders will be provided for nursing staff. Exercises and functional mobility practice will be used as well as appropriate assistive devices or modalities to obtain goals. Patient and family education will also be administered as needed. Frequency of treatments: 2-5x/week x 7-10 days.     Time in  0852  Time out  [facility], PT, DPT  [id number]

## 2019-08-25 NOTE — PROGRESS NOTES
Hospitalist Progress Note      Synopsis: Patient admitted on 8/24/2019   Due to ambulatory dysfunction shortly after discharge from hospitalization    Subjective    Seen and examined  Slightly drowsy  Overmedicated of pain medication most likely    Stable overnight. No other overnight issues reported. Denies chest pain, shortness of breath, focal weakness    Exam:  BP (!) 144/67   Pulse 63   Temp 97 °F (36.1 °C) (Temporal)   Resp 14   Ht 5' 1\" (1.549 m)   Wt 155 lb (70.3 kg)   SpO2 98% Comment: pt states she wears 5 lpm cannula at home  BMI 29.29 kg/m²   General appearance: No apparent distress, appears stated age and cooperative. HEENT:  Conjunctivae/corneas clear. Neck: Supple. No jugular venous distention. Respiratory: Clear to auscultation bilaterally without rhonchi or rales  Cardiovascular: Regular rate rhythm without murmurs  Abdomen: Soft nontender  Musculoskeletal: No clubbing, cyanosis, no edema bilaterally. Brisk capillary refill.    Skin:  No rashes  on visible skin  Neurologic: awake, alert and following commands     Medications:  Reviewed    Infusion Medications    sodium chloride 100 mL/hr at 08/25/19 1053    dextrose       Scheduled Medications    ipratropium-albuterol  1 ampule Inhalation Q4H WA    amiodarone  200 mg Oral Daily    apixaban  5 mg Oral Q12H    atorvastatin  80 mg Oral Nightly    calcium-vitamin D  1 tablet Oral BID    citalopram  20 mg Oral Nightly    clonazePAM  0.5 mg Oral BID    clopidogrel  75 mg Oral Daily    diltiazem  180 mg Oral Daily    fenofibrate  160 mg Oral Daily    hydrALAZINE  50 mg Oral 3 times per day    levothyroxine  137 mcg Oral Daily    nadolol  20 mg Oral Daily    pantoprazole  40 mg Oral Daily    polyethylene glycol  17 g Oral Daily    Roflumilast  500 mcg Oral Daily    linagliptin  5 mg Oral Daily    sulfamethoxazole-trimethoprim  1 tablet Oral BID    sodium chloride flush  10 mL Intravenous 2 times per day    insulin 1 dose of calcium gluconate for hyperkalemia -no obvious sources of potassium supplementation noted. Check urine electrolytes, ABG to rule out acidosis -pseudo hyperkalemia ruled out by repeat elevated potassium. Suspect beta-blocker effect, Nadolol - K elevated since 8/21 - nadolol started 8/20 - will consult cardio / nephro to re eval.    PT OT  Disposition planning  Avoid scheduled narcotic use  Lower dose of Norco as needed      Diet: DIET CARB CONTROL;  Code Status: Full Code  Disposition: Subacute versus skilled recommended    +++++++++++++++++++++++++++++++++++++++++++++++++  Marisol Gillespie MD   [address]  +++++++++++++++++++++++++++++++++++++++++++++++++  NOTE: This report was transcribed using voice recognition software. Every effort was made to ensure accuracy; however, inadvertent computerized transcription errors may be present.

## 2019-08-25 NOTE — CONSULTS
Department of Internal Medicine  Nephrology Attending Consult Note      Reason for Consult:  hyperkalemia  Requesting Physician:  Dr Lb Acharya:  weakness    History Obtained From:  patient, electronic medical record    HISTORY OF PRESENT ILLNESS:    Angel Rubio is a 75 y.o. female with past medical history of hypertension on bactrim at home for recent septicemia, diabetes, COPD on home oxygen, hyperlipidemia, coronary artery disease status post CABG and stent placements, hypothyroidism, coag negative staph septicemia  presented to the ER with chief complaints of weakness. She had no other symptoms including headache, speech impairment, dizziness, lightheadedness. She does have history of diabetic neuropathy. Labs on admission showed a creatinine of 1.9 with a BUN of 68 and K level of 5.6 on admission. She continued to have persistent hyperkalemia with serum K level rising to 5.8 today therefore we are consulted for further management. Patient has been on Bactrim DS one tab twice daily since 8/19/10 and will complete taking the course today as requested by ID service.     Past Medical History:        Diagnosis Date    Arthritis     CAD (coronary artery disease)     CHF (congestive heart failure) (HCC)     COPD (chronic obstructive pulmonary disease) ([address]     Diabetes ([address]     Diabetic ulcer of right calf associated with type 2 diabetes mellitus, with fat layer exposed ([address] 3/6/2019    HTN (hypertension)     Hyperlipidemia     Osteomyelitis (HCC)     Postoperative anemia due to acute blood loss 5/1/2019    Pseudoaneurysm of left femoral artery ([address] 5/1/2019    Thigh hematoma, left, initial encounter 5/1/2019    Thyroid disease      Past Surgical History:        Procedure Laterality Date    ABDOMEN SURGERY      CARDIAC SURGERY      CHOLECYSTECTOMY      COLONOSCOPY      CORONARY ARTERY BYPASS GRAFT N/A May 22, 2013    ENDOSCOPY, COLON, DIAGNOSTIC      HYSTERECTOMY      JOINT REPLACEMENT      bilateral knees     Current Medications:    Current Facility-Administered Medications: 0.9 % sodium chloride infusion, , Intravenous, Continuous  HYDROcodone-acetaminophen (NORCO) 5-325 MG per tablet 1 tablet, 1 tablet, Oral, Q6H PRN  ipratropium-albuterol (DUONEB) nebulizer solution 1 ampule, 1 ampule, Inhalation, Q4H WA  amiodarone (CORDARONE) tablet 200 mg, 200 mg, Oral, Daily  apixaban (ELIQUIS) tablet 5 mg, 5 mg, Oral, Q12H  atorvastatin (LIPITOR) tablet 80 mg, 80 mg, Oral, Nightly  calcium-vitamin D (OSCAL-500) 500-200 MG-UNIT per tablet 1 tablet, 1 tablet, Oral, BID  citalopram (CELEXA) tablet 20 mg, 20 mg, Oral, Nightly  clonazePAM (KLONOPIN) tablet 0.5 mg, 0.5 mg, Oral, BID  clopidogrel (PLAVIX) tablet 75 mg, 75 mg, Oral, Daily  diltiazem (CARDIZEM CD) extended release capsule 180 mg, 180 mg, Oral, Daily  fenofibrate tablet 160 mg, 160 mg, Oral, Daily  hydrALAZINE (APRESOLINE) tablet 50 mg, 50 mg, Oral, 3 times per day  levothyroxine (SYNTHROID) tablet 137 mcg, 137 mcg, Oral, Daily  pantoprazole (PROTONIX) tablet 40 mg, 40 mg, Oral, Daily  polyethylene glycol (GLYCOLAX) packet 17 g, 17 g, Oral, Daily  Roflumilast (DALIRESP) tablet 500 mcg, 500 mcg, Oral, Daily  linagliptin (TRADJENTA) tablet 5 mg, 5 mg, Oral, Daily  sulfamethoxazole-trimethoprim (BACTRIM DS;SEPTRA DS) 800-160 MG per tablet 1 tablet, 1 tablet, Oral, BID  sodium chloride flush 0.9 % injection 10 mL, 10 mL, Intravenous, 2 times per day  sodium chloride flush 0.9 % injection 10 mL, 10 mL, Intravenous, PRN  magnesium hydroxide (MILK OF MAGNESIA) 400 MG/5ML suspension 30 mL, 30 mL, Oral, Daily PRN  acetaminophen (TYLENOL) tablet 650 mg, 650 mg, Oral, Q4H PRN  insulin lispro (HUMALOG) injection vial 0-12 Units, 0-12 Units, Subcutaneous, TID WC  insulin lispro (HUMALOG) injection vial 0-6 Units, 0-6 Units, Subcutaneous, Nightly  glucose (GLUTOSE) 40 % oral gel 15 g, 15 g, Oral, PRN  dextrose 50 % IV solution, 12.5 g, Intravenous, O2 via nasal cannula at 4-5 L  Skin: no rash, turgor wnl  Heent:  eomi, mmm  Neck: no bruits or jvd noted  Cardiovascular:  S1, S2 without m/r/g  Respiratory: CTA B without w/r/r  Abdomen:  +bs, soft, nt, nd  Ext: No lower extremity edema  Psychiatric: mood and affect appropriate  Musculoskeletal:  Rom, muscular strength intact    DATA:    CBC:   Lab Results   Component Value Date    WBC 7.1 08/25/2019    RBC 3.74 08/25/2019    HGB 10.9 08/25/2019    HCT 35.4 08/25/2019    MCV 94.7 08/25/2019    MCH 29.1 08/25/2019    MCHC 30.8 08/25/2019    RDW 17.2 08/25/2019     08/25/2019    MPV 9.9 08/25/2019     CMP:    Lab Results   Component Value Date     08/25/2019    K 5.8 08/25/2019    K 5.8 08/25/2019    K 6.0 08/25/2019    CL 99 08/25/2019    CO2 26 08/25/2019    BUN 60 08/25/2019    CREATININE 2.1 08/25/2019    GFRAA 28 08/25/2019    LABGLOM 23 08/25/2019    GLUCOSE 173 08/25/2019    GLUCOSE 123 05/11/2012    PROT 6.2 08/22/2019    LABALBU 3.8 08/22/2019    LABALBU 4.3 05/11/2012    CALCIUM 9.3 08/25/2019    BILITOT 0.2 08/22/2019    ALKPHOS 62 08/22/2019    AST 19 08/22/2019    ALT 23 08/22/2019     Magnesium:    Lab Results   Component Value Date    MG 2.2 08/22/2019     Phosphorus:    Lab Results   Component Value Date    PHOS 4.8 08/22/2019     Uric Acid:  No results found for: Jayme Tristan  HgBA1c:    Lab Results   Component Value Date    LABA1C 4.7 06/14/2019     Microalbumen/Creatinine ratio:  No components found for: RUCREAT  TSH:    Lab Results   Component Value Date    TSH 0.567 08/15/2019     VITAMIN B12: No components found for: B12  FOLATE:  No results found for: FOLATE  IRON:    Lab Results   Component Value Date    IRON 21 07/25/2013     Iron Saturation:  No components found for: PERCENTFE  TIBC:    Lab Results   Component Value Date    TIBC 190 07/25/2013     Radiology Review:    1.  Stable, enlarged cardiomediastinal silhouette with underlying   central pulmonary vascular congestion and thoracic aortic vascular   calcifications. 2. Nonspecific bibasilar airspace disease, findings can be seen in   infiltrate/pneumonia and/or atelectasis. . Bilateral pleural effusions.           IMPRESSION/RECOMMENDATIONS:    Neva Jain is a 75 y.o. female with past medical history of hypertension on bactrim at home for recent septicemia, diabetes, COPD on home oxygen, hyperlipidemia, coronary artery disease status post CABG and stent placements, hypothyroidism, coag negative staph septicemia  presented to the ER with chief complaints of weakness. She had no other symptoms including headache, speech impairment, dizziness, lightheadedness. She does have history of diabetic neuropathy. Labs on admission showed a creatinine of 1.9 with a BUN of 68 and K level of 5.6 on admission. She continued to have persistent hyperkalemia with serum K level rising to 5.8 today therefore we are consulted for further management. Assessment  1. Persistent hyperkalemia, multifactorial from underlying chronic kidney disease, Bactrim(trimethoprim)  use  2. Acute kidney injury, nonoliguric, with solitary functioning left kidney. MICHELLE is secondary to prerenal azotemia versus AIN due to Bactrim  3. Chronic kidney disease stage III secondary to hypertensive nephrosclerosis  4. Hypertension- controlled  5. Recent septicemia- completing antibiotic course today     Plan  1. Check urine for random electrolytes, urine creatinine, urine protein, urine and plasma osmolality. 2. Check plasma renin activity and serum aldosteronism  3. Stop Bactrim, check urine eosinophils  4. Kayexalate 15 gm po now   5.  Repeat BMP at 5:30 PM today, nurse to call if the potassium is over 5.0        Thank you Dr. Shane Rosenberg for allowing us to participate

## 2019-08-26 NOTE — PROGRESS NOTES
Hospitalist Progress Note      Synopsis: Patient admitted on 8/24/2019   Due to ambulatory dysfunction shortly after discharge from hospitalization    Subjective    Seen and examined  Appears more alert today. Stable overnight. No other overnight issues reported. Denies chest pain, shortness of breath, focal weakness    Exam:  BP (!) 125/58   Pulse 69   Temp 96.4 °F (35.8 °C) (Temporal)   Resp 16   Ht 5' 1\" (1.549 m)   Wt 158 lb 11.2 oz (72 kg)   SpO2 93%   BMI 29.99 kg/m²   General appearance: No apparent distress, appears stated age and cooperative. HEENT:  Conjunctivae/corneas clear. Neck: Supple. No jugular venous distention. Respiratory: Clear to auscultation bilaterally without rhonchi or rales  Cardiovascular: Regular rate rhythm without murmurs  Abdomen: Soft nontender  Musculoskeletal: No clubbing, cyanosis, no edema bilaterally. Brisk capillary refill.    Skin:  No rashes  on visible skin  Neurologic: awake, alert and following commands     Medications:  Reviewed    Infusion Medications    dextrose       Scheduled Medications    sodium polystyrene  15 g Oral Once    LORazepam  0.5 mg Intravenous Once    nadolol  20 mg Oral Daily    ipratropium-albuterol  1 ampule Inhalation Q4H WA    amiodarone  200 mg Oral Daily    apixaban  5 mg Oral Q12H    atorvastatin  80 mg Oral Nightly    calcium-vitamin D  1 tablet Oral BID    citalopram  20 mg Oral Nightly    clonazePAM  0.5 mg Oral BID    clopidogrel  75 mg Oral Daily    diltiazem  180 mg Oral Daily    fenofibrate  160 mg Oral Daily    hydrALAZINE  50 mg Oral 3 times per day    levothyroxine  137 mcg Oral Daily    pantoprazole  40 mg Oral Daily    polyethylene glycol  17 g Oral Daily    Roflumilast  500 mcg Oral Daily    linagliptin  5 mg Oral Daily    sodium chloride flush  10 mL Intravenous 2 times per day    insulin lispro  0-12 Units Subcutaneous TID WC    insulin lispro  0-6 Units Subcutaneous Nightly    mometasone-formoterol  2 puff Inhalation BID    tiotropium  18 mcg Inhalation Daily     PRN Meds: HYDROcodone-acetaminophen, sodium chloride flush, magnesium hydroxide, acetaminophen, glucose, dextrose, glucagon (rDNA), dextrose    I/O    Intake/Output Summary (Last 24 hours) at 8/26/2019 1609  Last data filed at 8/26/2019 1317  Gross per 24 hour   Intake 780 ml   Output 300 ml   Net 480 ml       Labs:   Recent Labs     08/24/19  1307 08/25/19  0747   WBC 7.1 7.1   HGB 11.1* 10.9*   HCT 35.0 35.4    149       Recent Labs     08/25/19  1024 08/25/19  1819 08/26/19  0459    132 139   K 5.8*  5.8* 5.4* 5.4*   CL 99 97* 104   CO2 26 28 28   BUN 60* 56* 56*   CREATININE 2.1* 2.1* 2.0*   CALCIUM 9.3 9.4 9.1       No results for input(s): PROT, ALB, ALKPHOS, ALT, AST, BILITOT, AMYLASE, LIPASE in the last 72 hours. No results for input(s): INR in the last 72 hours. Recent Labs     08/24/19  1307   TROPONINI 0.02       Chronic labs:  Lab Results   Component Value Date    CHOL 131 12/16/2018    TRIG 134 12/16/2018    HDL 33 12/16/2018    LDLCALC 71 12/16/2018    TSH 0.567 08/15/2019    INR 1.0 04/30/2019    LABA1C 4.7 06/14/2019       Radiology:    Most recent imaging studies reviewed today. ASSESSMENT:    Principal Problem:    Ambulatory dysfunction  Active Problems:    Acute kidney injury superimposed on chronic kidney disease (HCC)    Type 2 diabetes mellitus with renal complication (HCC)    HTN (hypertension)    HLD (hyperlipidemia)    COPD mixed type (HCC)    CAD (coronary artery disease)    Hypothyroidism    Tobacco dependence    Diabetic ulcer of right calf associated with type 2 diabetes mellitus, with fat layer exposed ([address]    PVD (peripheral vascular disease) ([facility]  Resolved Problems:    * No resolved hospital problems. *       PLAN:    Continue IV fluid hydration with caution    Appreciate nephrology support. Work-up and management noted.   PT OT evaluation  Patient agreeable to rehab,

## 2019-08-26 NOTE — CARE COORDINATION
8/26/19, SW spoke with Bay Pelayo and wife-Irene with patient in room on SNF Rehab. Discussed options for [facility] which are limited. Discussed [facility]. Contacted facility and they do take insurance. Contact was made with [facility] ([address]) on patient referral.  Awaiting to hear back from [facility] Dumont on referral.  It was late Monday evening when this referral was made. SW to follow.

## 2019-08-26 NOTE — HOME CARE
Patient is an active referral for [facility] care. Will need new home care orders at discharge for skilled nursing, pt, ot, ms, hha. [facility] will follow.  Lisbeth Singh lpn

## 2019-08-26 NOTE — PROGRESS NOTES
Dr. Luis Cervantes served regarding patient's potassium level of 5.4.   Electronically signed by Polo Cabrera RN on 8/26/2019 at 7:50 AM

## 2019-08-26 NOTE — PROCEDURES
Need MRI screening form filled out before MRI can be scheduled. Thank you.  Spoke with Osvaldo Cid- to Let RN know

## 2019-08-26 NOTE — PROGRESS NOTES
hemodynamically mediated 2/2 to HF? 2. CKD stage 3, with right atrophic kidney  3. Right atrophic kidney, likely GENA   4. Hyperkalemia, 2/2 to trimethoprim (on Bactrim) and decrease GFR   5. Probably HFpEF 65%stage I DD  6. PAF, on nadolol, diltiazem, amiodarone   7.  HTN, on nadolol, diltiazem, hydralazine       Plan:    · Obtain renal flow scan   · Obtain MRA without iv contrast  · Lasix 40 mg iv x one   · Kayexalate 15 gr po x one   · Continue to monitor renal function  · Low K diet

## 2019-08-26 NOTE — CARE COORDINATION
8/26/19, SW met with patient at bedside. Discussed transition of care/discharge plan and SW role. Patient was very sleepy and hard to understand at times. Patient lives in a double wide home with a caregiver that takes care of her. There are 3/4 steps to get into the home. DME is [employer] and pharmacy is [facility]. Patient feels she would benefit from a wheelchair. As SW asked questions patient fell back to sleep. Reviewed [facility] involved with patient prior to her coming to hospital.  Patient would like to return home and son would be transportation. Tentative plan is home with HH-oders would be needed for pt/ot/ms/hha. Patient had scores with PT/OT 11/21 on 8/25. Patient is on oxgen now 3L and has it at home. Patient could not be understood as to who she uses at home due to sleepiness. [employer], [facility] [address] contacted and they do not have patient for  Oxygen. SW to follow.

## 2019-08-27 NOTE — CARE COORDINATION
N-17 started. HENS complete. Ambulette form, facesheet, and envelope in soft chart.   Electronically signed by SRIKANTH Estrada on 8/27/2019 at 2:52 PM

## 2019-08-27 NOTE — PROGRESS NOTES
Physical Therapy  Facility/Department: [address] NEURO SPINE  Daily Treatment Note  NAME: Lanie Joseph  : 1945  MRN: [id number]    Date of Service: 2019   Evaluating PT:  Omayra Taveras, PT, DPT [id number]     Room #:  9464/7873-Y  Diagnosis:  Ambulatory dysfunction  PMHx:  Arthritis, CAD, CHF,COPD, DM, diabetic ulcer, HTN HLD, osteomyelitis, pseudoaneurysm of L femoral artery, L thigh hematoma, thyroid disease   Precautions:  Falls, O2  Equipment Needs:  WC     Pt lives with friend/caregiver in a 1 story mobile home with 3 stairs to enter and 1 rail(s). Bedroom and bathroom are on the main level with 1 additional step. Pt ambulated with [employer] PTA. Pt on 5 L at home. Pt has been mostly non-ambulatory since recent hospital admission.                Initial Evaluation  Date: 19 Treatment   Short Term/ Long Term   Goals   AM-PAC 6 Clicks 42/45  86/57     Was pt agreeable to Eval/treatment? Yes  yes     Does pt have pain? No c/o pain   c/o slight chest pain, pt reports typical.      Bed Mobility  Rolling: NT  Supine to sit: NT  Sit to supine: Max A  Scooting: Max A  Rolling: NT  Supine tos ti: Min a   Sit to supine: NT  Scooting: Min A Min A   Transfers Sit to stand: Max A  Stand to sit: Max A  Stand pivot: Max A with [employer] Sit to stand; MIn A   Stand to sit: Min a   Stand pivot: Min A  Sit to stand: Min A  Stand to sit: Min A  Stand pivot: Min A with [employer]   Ambulation    Few steps back to bed with [employer] Max A 3 feet to bedside commode  with ww Min A and then 3 feet forward/backward to beside chair ww Min a >50 feet with [employer] Min A   Stair negotiation: ascended and descended  NT  NT >3 steps with 1 rail Mod A   ROM BUE:  Per OT eval   BLE:  WFL       Strength BUE:  Per OT eval   BLE:  Grossly > 3/5        Balance Sitting EOB:  Min A  Dynamic Standing:   Max A with [employer]  sitting EOB: SBA  Dynamic standing: SBA Sitting EOB:  SBA  Dynamic Standing:  Min A       Patient education  Pt was educated on hand placement for transfers     Patient response to education:   Pt verbalized understanding Pt demonstrated skill Pt requires further education in this area   x x x     Additional Comments: Pt supine on arrival and requested to use bedside commode for BM. Pt with Min A for transfers, however decreased tolerance to activity at this time. Pt was left seated in bedside chair  with call light left by patient    Time in: 0821  Time out: 0845    Pt is making good progress toward established Physical Therapy goals. Continue with physical therapy current plan of care.     Charley Marc PTA  License Number: [id number]

## 2019-08-27 NOTE — PROGRESS NOTES
tiotropium  18 mcg Inhalation Daily     PRN Meds: HYDROcodone-acetaminophen, sodium chloride flush, magnesium hydroxide, acetaminophen, glucose, dextrose, glucagon (rDNA), dextrose    I/O    Intake/Output Summary (Last 24 hours) at 8/27/2019 0825  Last data filed at 8/27/2019 8477  Gross per 24 hour   Intake 600 ml   Output 2550 ml   Net -1950 ml       Labs:   Recent Labs     08/24/19  1307 08/25/19  0747   WBC 7.1 7.1   HGB 11.1* 10.9*   HCT 35.0 35.4    149       Recent Labs     08/25/19  1819 08/26/19  0459 08/26/19 2022    139 141   K 5.4* 5.4* 5.4*   CL 97* 104 104   CO2 28 28 31*   BUN 56* 56* 51*   CREATININE 2.1* 2.0* 2.1*   CALCIUM 9.4 9.1 9.2       No results for input(s): PROT, ALB, ALKPHOS, ALT, AST, BILITOT, AMYLASE, LIPASE in the last 72 hours. No results for input(s): INR in the last 72 hours. Recent Labs     08/24/19  1307   TROPONINI 0.02       Chronic labs:  Lab Results   Component Value Date    CHOL 131 12/16/2018    TRIG 134 12/16/2018    HDL 33 12/16/2018    LDLCALC 71 12/16/2018    TSH 0.567 08/15/2019    INR 1.0 04/30/2019    LABA1C 4.7 06/14/2019       Radiology:    Most recent imaging studies reviewed today. ASSESSMENT:    Principal Problem:    Ambulatory dysfunction  Active Problems:    Acute kidney injury superimposed on chronic kidney disease (HCC)    Type 2 diabetes mellitus with renal complication (HCC)    HTN (hypertension)    HLD (hyperlipidemia)    COPD mixed type (HCC)    CAD (coronary artery disease)    Hypothyroidism    Tobacco dependence    Diabetic ulcer of right calf associated with type 2 diabetes mellitus, with fat layer exposed ([address]    PVD (peripheral vascular disease) ([facility]  Resolved Problems:    * No resolved hospital problems. *       PLAN:    NM renal scan/ MRA abdomen per nephro. Appreciate nephrology support. Work-up and management noted. Avoid scheduled narcotic use; Lower dose of Norco as needed  Discharge next 48 hours.     Diet: Dietary

## 2019-08-27 NOTE — PROGRESS NOTES
Department of Internal Medicine  Nephrology Attending Progress Note    Events reviewed. SUBJECTIVE:  We are following Mrs. Heredia forAKI and hyperkalemia. Reports no new complaints.      Physical Exam:    VITALS:  BP (!) 101/54   Pulse 74   Temp 98.9 °F (37.2 °C) (Temporal)   Resp 16   Ht 5' 1\" (1.549 m)   Wt 158 lb 11.2 oz (72 kg)   SpO2 93%   BMI 29.99 kg/m²   24HR INTAKE/OUTPUT:      Intake/Output Summary (Last 24 hours) at 8/27/2019 0920  Last data filed at 8/27/2019 0655  Gross per 24 hour   Intake 600 ml   Output 2550 ml   Net -1950 ml     General: Alert, awake, oriented ×2, appears in mild respiratory distress, on O2 via nasal cannula at 4-5 L  Skin: no rash, turgor wnl  Heent:  eomi, mmm  Neck: no bruits or jvd noted  Cardiovascular:  S1, S2 without m/r/g  Respiratory: CTA B without w/r/r  Abdomen:  +bs, soft, nt, nd  Ext: No lower extremity edema  Psychiatric: mood and affect appropriate  Musculoskeletal:  Rom, muscular strength intact       DATA:    CBC:   Lab Results   Component Value Date    WBC 7.1 08/25/2019    RBC 3.74 08/25/2019    HGB 10.9 08/25/2019    HCT 35.4 08/25/2019    MCV 94.7 08/25/2019    MCH 29.1 08/25/2019    MCHC 30.8 08/25/2019    RDW 17.2 08/25/2019     08/25/2019    MPV 9.9 08/25/2019     CMP:    Lab Results   Component Value Date     08/26/2019    K 5.4 08/26/2019    K 6.0 08/25/2019     08/26/2019    CO2 31 08/26/2019    BUN 51 08/26/2019    CREATININE 2.1 08/26/2019    GFRAA 28 08/26/2019    LABGLOM 23 08/26/2019    GLUCOSE 128 08/26/2019    GLUCOSE 123 05/11/2012    PROT 6.2 08/22/2019    LABALBU 3.8 08/22/2019    LABALBU 4.3 05/11/2012    CALCIUM 9.2 08/26/2019    BILITOT 0.2 08/22/2019    ALKPHOS 62 08/22/2019    AST 19 08/22/2019    ALT 23 08/22/2019     Magnesium:    Lab Results   Component Value Date    MG 2.2 08/22/2019     Phosphorus:    Lab Results   Component Value Date    PHOS 4.8 08/22/2019     Pro- BNP: 18,564    Urine P/C: 0.4 gr/gr  Urine A/C: 183 mg/gr    Urine Na: 74  Urine K: 21.8  Urine CL: 51  Urine creat: 47      Radiology Review:      CXR 8/24/19   1. Stable, enlarged cardiomediastinal silhouette with underlying   central pulmonary vascular congestion and thoracic aortic vascular   calcifications. 2. Nonspecific bibasilar airspace disease, findings can be seen in   infiltrate/pneumonia and/or atelectasis. . Bilateral pleural effusions. CT Chest without contrast 8/16/19       1. New opacities present within right upper lobe may indicate new   atelectasis or pneumonia.       2. Areas of subsegmental atelectasis or scarring seen in the lung   bases.       3. Hyperinflation of both lungs might suggest chronic obstructive   pulmonary disease. Clinical correlation recommended.       4. Atherosclerotic disease associated with the coronary arteries.       5. Partial visualization of bowel containing ventral abdominal wall   hernia. Kidney US 7/15/18   COMPARISON: CT scan 7/9/2017       The right kidney measures 8 x 2.2 x 4.3 cm    The left kidney  measures 11.5 x 5.3 x 5.6 and   There is a hypoechoic nodule superior to the left kidney when compared   with the previous CT scan I suspect represents an accessory spleen   There is no hydronephrosis identified   There is no calculus identified   The bladder is unremarkable    Impression:  Right renal atrophy          MRA abdomen without contrast August 26, 2019   Findings:   5 sequences are submitted patient was premedicated. Patient was unable   to tolerate the scanner due to claustrophobia patient was unable to   tolerate IV contrast.       The study is technically suboptimal   The patient status post cholecystectomy   There is biliary dilatation within the limits of the study no   convincing obstruction or pancreatic mass is seen. There is a ventral hernia identified this appears to contain a portion   of colon. The origin of the right and left renal arteries cannot be evaluated.    There is severe right renal atrophy   There is a probable right renal cyst   There is a lesion in the left kidney statistically likely a cyst.   Impression:   Suboptimal study due to patient debility cooperate and   tolerate the imaging. Biliary dilatation           BRIEF SUMMARY OF INITIAL CONSULT:    Briefly Mrs. Javi Sosa is a 75year old woman with h/o CKD stage 3, baseline creatinine 1.3-1.4 mg/dL, with right atrophic kidney, HTN, type 2 DM, CAD s/p CABG, COPD on home oxygen, hypothyroidism, recent admission with pneumonia complicated with PAF with RVR,  who was admitted on 8/25/19 after she presented to the ER with weakness. On admission she was found with a creatinine of 1.9 mg/dL and K of 5.6 mEq/L reason for this consultation. Her medications prior to admission included Bactrim. IMPRESSION/RECOMMENDATIONS:      1. MICHELLE on CKD, probably hemodynamically mediated 2/2 to HF? 2. CKD stage 3, with right atrophic kidney  3. Right atrophic kidney, likely GENA   4. Hyperkalemia, 2/2 to trimethoprim (on Bactrim) and decrease GFR   5. Probably HFpEF 65%stage I DD  6. PAF, on nadolol, diltiazem, amiodarone   7.  HTN, on nadolol, diltiazem, hydralazine       Plan:    · Await renal flow scan   · Lasix 40 mg iv x one   · Continue to monitor renal function  · Continue Low K diet

## 2019-08-27 NOTE — PROGRESS NOTES
Occupational Therapy  OT BEDSIDE TREATMENT NOTE      Date:2019  Patient Name: Coral Dodd  MRN: [id number]  : 1945  Room: [address] [place], [place] [address]     Evaluating OT: Patito Orantes OTR/L [id number]      AM-PAC Daily Activity Raw Score: 15/24  Recommended Adaptive Equipment: w/c, BSC     Diagnosis: Ambulatory Dysfunction    Surgery: none    Pertinent Medical History: COPD, CHF, DM, HTN, Arthritis     Precautions:  Falls, O2      Home Living: Pt lives with a friend  in a 1 story home  with 3 step(s) to enter and one  rail(s)  Bathroom setup: tub/shower   Equipment owned: ww,   Prior Level of Function: Moderate Assist  with ADLs , Moderate Assist  with IADLs; using ww for ambulation. Pt had recent Hospital admission and discharge. Pt reports only being home a few days and has not been able to complete any of her ADL's on her own. Pt also reports needing assist with all functional transfers   Driving: pt does not drive   Occupation: retired      Pain Level: Noted some chest pain. Cognition: A&O: 4/4; Follows 1-2 step directions              Memory:  good               Sequencing:  good               Problem solving:  fair+               Judgement/safety:  fair+                 Functional Assessment:    Initial Eval Status  Date: 19 Treatment Status  Date: 19 Short Term Goals  Treatment frequency: PRN 1-3 x/week   Feeding Set up   Setup; To complete self feeding with increased time. Independent    Grooming Minimal Assist  Sup;    Pt able to comb hair and wash face while sitting up in the chair.     Supervision    UB Dressing Moderate Assist   Min A;    To micah/doff gown while sitting EOB with min verbal prompting to improve proper donning. Supervision    LB Dressing Maximal Assist  Max A;    Per last treatment.    Moderate Assist    Bathing NT  N/T       Toileting Maximal Assist   Min A with w/w- transfer    Max A for hygiene while in standing.       Minimal Assist    Bed Mobility  Supine to sit: Maximal Assist   Sit to supine: Maximal Assist   Supine to sit: Maximal Assist   Sit to supine: N/T  Supine to sit: Minimal Assist   Sit to supine: Minimal Assist    Functional Transfers Sit to stand: Maximal Assist   Stand to sit: Maximal Assist   Stand pivot: Maximal Assist  Sit to stand: Min A  Stand to sit: Min A  Stand pivot: Min A with use of w/w Improve sit to stand transfers to [address] stand pivot transfers to  Mod A    Functional Mobility NT  Min A with w/w     Balance Sitting:     Static:  Min A     Dynamic:Mod A   Standing: Max A   Sitting:     Static:  SBA     Dynamic:Min A   Standing: Min A with w/w     Activity Tolerance Poor - pt complains of fatigue with light activity  [employer] pt on energy conservation and pacing techniques to utilize during ADL's    Visual/  Perceptual Glasses: wears glasses all the time      Yes                  Hand dominance: right   UE ROM:        RUE:  WFL                  LUE:  WFL  Strength:        RUE: grossly 3+/5       LUE: grossly 3+/5   Strength: B WFL  Fine Motor Coordination:  WFL     Hearing: WFL  Sensation:  No c/o numbness or tingling  Tone:  WFL  Edema: none in BUE's                             Comments/Treatment: Upon arrival, patient in supine with HOB elevated. Pt was agreeable to OT treatment with PTA . Pt completed toileting and ADL tasks. At end of session, patient assisted to bedside chair with call light and phone within reach, all lines and tubes intact. Pt would benefit from continued skilled OT to increase functional independence and quality of life. · Pt has made fair progress towards set goals. · Continue with current plan of care    Time in: 0820  Time PTT:1297  Total Tx Time: 25 mins    Kishore BOSWELL/MADHAV, [address]  I have read the above note and agree with the documentation.   Clem Diaz OTR/L [id number]

## 2019-08-27 NOTE — CARE COORDINATION
8/27/19, SW heard from Dayana Péerz at [facility] that patient was given auth-however only good for today. Precert will have to be submitted tomorrow should patient not be medically stable for discharge today. Charge Nurse-Madhuri and Nurse-Renee informed. Dayana Pérez updated on patient status.

## 2019-08-27 NOTE — CARE COORDINATION
8/27/19, SW met with patient-who was sitting in chair. Discussed patient being referred to [address] [place] [address] per son and am awaiting to hear from Elio Bravo ([address]) on referral.  Discussed patient being linked with [facility] as well. Will await on referral and PT/OT scores and update patient who would prefer to return to home. SW to follow.

## 2019-08-27 NOTE — CARE COORDINATION
8/27/19, SW spoke with Karoline Burkett from [facility] on patient. Patient accepted for referral and precert has been initiated. SW to follow.

## 2019-08-28 PROBLEM — I50.33 ACUTE ON CHRONIC DIASTOLIC HEART FAILURE (HCC): Status: ACTIVE | Noted: 2019-01-01

## 2019-08-28 NOTE — DISCHARGE SUMMARY
Hospitalist Discharge Summary    Patient ID: Escobar Narvaez   Patient : 1945  Patient's PCP: Khanh Walton DO    Admit Date: 2019   Admitting Physician: Aly Springer MD    Discharge Date:  2019  Discharge Physician: Aly Springer MD   Discharge Condition: Stable  Discharge Disposition: Skilled Facility    History of presenting illness:    75 y.o. female who presented to [facility] with fatigue and weakness. Recently admitted for AMS and SOB to MICU. Bipap while here. Pulmonology set a trilogy up for the patient at home, she received today. Continued on vanc and zosyn. Infectious disease consulted for CONS bactremia, deemed contaminant and aspiration pneumonitis. Tx with Augmentin and Bactrim. Discharged with NIMV at home, steroid and anti biotics for remainder of course. patient was discharged 2 days ago. She presented back to the ED for general weakness and fatigue, cannot lift her head up. On arrival, vitals stable, on 4liters nasal cannula. Labs showed K 5.6, Bun/Cr 68/1.9. Elevated BNP. CXR showed possible pleural effusions. Treated with 500 cc NS. Per family, patient's discharged 2 days ago and patient had to be lifted by 2 people from the car into the home. She has been weak and lethargic, confused this morning. She has been weak for the last 2 days at home. She has had poor oral intake. Hospital course in brief:  (Please refer to daily progress notes for a comprehensive review of the hospitalization by requesting medical records)    Patient was admitted for further evaluation of hyperkalemia and therapy evaluations. Physical and occupational therapy evaluations directed patient was likely to benefit from short stay in rehab. MICHELLE, hyperkalemia likely secondary to Bactrim use and HF exacerbation treated appropriately. Patient was discharged to skilled nursing home in stable condition thereafter.       Consults:   IP CONSULT TO HOSPITALIST  IP 2019    RBCUA NONE 2013    BLOODU Negative 2019    SPECGRAV 1.020 2019    GLUCOSEU Negative 2019       Imaging:  Ct Head Wo Contrast    Result Date: 8/15/2019  EXAM: CT Head Without Contrast EXAM DATE/TIME: 8/15/2019 1:30 AM CLINICAL HISTORY: 75years old, female; Altered mental status/memory loss; Additional info: AMS TECHNIQUE: Imaging protocol: Computed tomography images of the head without contrast. Coronal and sagittal reformatted images were created and reviewed. Radiation optimization: All CT scans at this facility use at least one of these dose optimization techniques: automated exposure control; mA and/or kV adjustment per patient size (includes targeted exams where dose is matched to clinical indication); or iterative reconstruction. Other technique: STROKE PROTOCOL was implemented. 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 Mild atrophy and mild white matter chronic microvascular changes are noted. No hemorrhage or CT evidence of acute infarction is seen. Ventricles: Normal. No ventriculomegaly. Bones/joints: Unremarkable. No acute fracture. Sinuses: Mild bilateral maxillary sinusitis is noted. Mastoid air cells: Visualized mastoid air cells are well aerated. No mastoid effusion. Soft tissues: Bilateral periorbital soft tissue swelling is appreciated. No acute intracranial abnormality. Mild sinusitis. ASSESSMENT: ASPECTS ([address] [facility] Stroke Program Early CT Score) is 10. This report has been electronically signed by Angelica Alegria MD.    Ct Chest Wo Contrast    Result Date: 2019  Patient MRN:  [id number] : 1945 Age: 75 years Gender: Female Order Date:  2019 10:00 AM EXAM: CT CHEST WO CONTRAST COMPARISON: October 10, 2017 INDICATION: Pneumonia TECHNIQUE:  Low-dose CT acquisition technique included one of the following options; 1. Automated exposure control, 2.  Adjustment of mA and/or kV according to the patient's size or 3. Use of iterative reconstruction. FINDINGS: There are confluent opacities in right upper lobe as well as opacities within right middle lobe. Areas of scarring and subsegmental atelectasis is seen in the lung bases. There is hyperinflation of both lungs. No evidence of pneumothorax. The heart is normal in size. Atherosclerotic calcifications are associated with the coronary arteries. No evidence of mediastinal or hilar lymphadenopathy. View of the upper abdomen shows grossly normal bilateral adrenal glands. There is a bowel containing ventral abdominal wall hernia partially visualized. 1. New opacities present within right upper lobe may indicate new atelectasis or pneumonia. 2. Areas of subsegmental atelectasis or scarring seen in the lung bases. 3. Hyperinflation of both lungs might suggest chronic obstructive pulmonary disease. Clinical correlation recommended. 4. Atherosclerotic disease associated with the coronary arteries. 5. Partial visualization of bowel containing ventral abdominal wall hernia. Mra Abdomen Wo Contrast    Result Date: 2019  Patient MRN: [id number] : 1945 Age:  75 years Gender: Female Order Date: 2019 8:00 AM Exam: MRA ABDOMEN WO CONTRAST Number of Images: 369 views Indication:   RENAL FAILURE, CHRONIC (KIDNEY DISEASE) Comparison: CT scan dated 3/15/2019 Findings: 5 sequences are submitted patient was premedicated. Patient was unable to tolerate the scanner due to claustrophobia patient was unable to tolerate IV contrast. The study is technically suboptimal The patient status post cholecystectomy There is biliary dilatation within the limits of the study no convincing obstruction or pancreatic mass is seen. There is a ventral hernia identified this appears to contain a portion of colon. The origin of the right and left renal arteries cannot be evaluated.  There is severe right renal atrophy There is a probable right renal PORTABLE NUMBER OF IMAGES:   1  INDICATION:  SOB SOB COMPARISON: 2019 TECHNIQUE: AP view of the chest. FINDINGS: Stable cardiomegaly and pulmonary vascular congestion. There are increased bibasilar opacities. Opacity previously noted in the right midlung has resolved. No pneumothorax seen. Osseous structures are intact. 1. Increased bibasilar opacities. Differential includes atelectasis, infection, and/or layering pleural effusions. 2. Opacity previously noted in the right midlung has resolved. 3. Stable pulmonary vascular congestion. Xr Chest Portable    Result Date: 2019  Patient MRN: [id number] : 1945 Age:  75 years Gender: Female Order Date: 2019 9:15 AM Exam: XR CHEST PORTABLE Number of Views: 1 Indication:   Shortness of breath Comparison: 2019 Findings: There is a stable, enlarged cardiomediastinal silhouette with airspace disease in the right upper lung and right midlung with thoracic aortic vascular calcifications and underlying central pulmonary vascular congestion. Fausto Lawson No pneumothorax. IMPRESSION 1. Stable, enlarged cardiomediastinal silhouette with underlying central pulmonary vascular congestion. 2. Right upper and midlung airspace disease concerning for an infiltrate/pneumonia. Continued follow-up to complete resolution is recommended. Xr Chest Portable    Result Date: 2019  Patient MRN:  [id number] : 1945 Age: 75 years Gender: Female Order Date:  2019 11:30 PM EXAM: XR CHEST PORTABLE NUMBER OF IMAGES:  1 INDICATION:  fever fever COMPARISON: 2018  RESULT: Lines, tubes, and devices:  None. Lungs and pleura: There are multifocal consolidations in the bilateral lungs, most notably within the right perihilar region, concerning for infection. No large pleural effusion or pneumothorax. Cardiomediastinal silhouette:  Normal heart size. Other:  No acute osseous abnormality. Degenerative changes of the spine.       Multifocal consolidations in bilateral lungs, concerning for pneumonia. Follow-up to resolution is recommended. Discharge Medications:      Medication List      START taking these medications    HYDROcodone-acetaminophen 5-325 MG per tablet  Commonly known as:  NORCO  Take 1 tablet by mouth every 8 hours as needed for Pain for up to 1 day. mometasone-formoterol 100-5 MCG/ACT inhaler  Commonly known as:  DULERA  Inhale 2 puffs into the lungs 2 times daily     tiotropium 18 MCG inhalation capsule  Commonly known as:  SPIRIVA  Inhale 1 capsule into the lungs daily  Start taking on:  8/29/2019        CHANGE how you take these medications    amiodarone 200 MG tablet  Commonly known as:  CORDARONE  Take 1 tablet by mouth daily  Start taking on:  8/29/2019  What changed:    · when to take this  · Another medication with the same name was removed. Continue taking this medication, and follow the directions you see here. furosemide 20 MG tablet  Commonly known as:  LASIX  Take 2 tablets by mouth daily  What changed:  See the new instructions.         CONTINUE taking these medications    albuterol sulfate  (90 Base) MCG/ACT inhaler  Inhale 2 puffs into the lungs every 6 hours as needed for Wheezing or Shortness of Breath /// Same as Ventolin(Blue) Inhaler     apixaban 5 MG Tabs tablet  Commonly known as:  ELIQUIS  Take 1 tablet by mouth every 12 hours     atorvastatin 80 MG tablet  Commonly known as:  LIPITOR  Take 1 tablet by mouth nightly     calcium-vitamin D 500-200 MG-UNIT per tablet  Commonly known as:  OSCAL-500     citalopram 20 MG tablet  Commonly known as:  CELEXA     CLARITIN 10 MG tablet  Generic drug:  loratadine     clopidogrel 75 MG tablet  Commonly known as:  PLAVIX  Take 1 tablet by mouth daily     diltiazem 180 MG extended release capsule  Commonly known as:  CARDIZEM CD  Take 1 capsule by mouth daily     fenofibrate 145 MG tablet  Commonly known as:  TRICOR     hydrALAZINE 50 MG tablet  Commonly known as:

## 2019-08-28 NOTE — PROGRESS NOTES
Physical Therapy  Facility/Department: [address] NEURO SPINE  Daily Treatment Note  NAME: Lisa Nichole  : 1945  MRN: [id number]    Date of Service: 2019   Evaluating PT:  Faustino Funez PT, DPT [id number]     Room #:  0875/8967-I  Diagnosis:  Ambulatory dysfunction  PMHx:  Arthritis, CAD, CHF,COPD, DM, diabetic ulcer, HTN HLD, osteomyelitis, pseudoaneurysm of L femoral artery, L thigh hematoma, thyroid disease   Precautions:  Falls, O2  Equipment Needs:  WC     Pt lives with friend/caregiver in a 1 story mobile home with 3 stairs to enter and 1 rail(s). Bedroom and bathroom are on the main level with 1 additional step. Pt ambulated with [facility] PTA. Pt on 5 L at home. Pt has been mostly non-ambulatory since recent hospital admission.                Initial Evaluation  Date: 19 Treatment   Short Term/ Long Term   Goals   AM-PAC 6 Clicks 79/89  99/75     Was pt agreeable to Eval/treatment? Yes  yes     Does pt have pain? No c/o pain  No c/o pain      Bed Mobility  Rolling: NT  Supine to sit: NT  Sit to supine: Max A  Scooting: Max A  Rolling: NT  Supine to sit:  Min a   Sit to supine: NT  Scooting: Min A Min A   Transfers Sit to stand: Max A  Stand to sit: Max A  Stand pivot: Max A with [facility] Sit to stand; MIn A   Stand to sit: Min A   Stand pivot: Min A  Sit to stand: Min A  Stand to sit: Min A  Stand pivot: Min A with [facility]   Ambulation    Few steps back to bed with [facility] Max A 3 feet to bedside  >50 feet with [facility] Min A   Stair negotiation: ascended and descended  NT  NT >3 steps with 1 rail Mod A   ROM BUE:  Per OT eval   BLE:  WFL       Strength BUE:  Per OT eval   BLE:  Grossly > 3/5        Balance Sitting EOB:  Min A  Dynamic Standing:   Max A with [facility]  sitting EOB: SBA  Dynamic standing: SBA Sitting EOB:  SBA  Dynamic Standing:  Min A       Patient education  Pt was educated on hand placement for transfers     Patient response to education:   Pt verbalized understanding Pt demonstrated skill Pt requires further education in this area   x x x     Additional Comments: Pt supine on arrival and agreeable to PT treatment pt agreeable to sit in bedside chair only, pt reuqired cues for hand placement. therex of B LE AP's, LAQ and reciprocal marches until fatigued. Pt was left seated in bedside chair  with call light left by patient    Time in: 0740  Time out: 0805    Pt is making good progress toward established Physical Therapy goals. Continue with physical therapy current plan of care.     Mikayla Moreira PTA  License Number: [id number]

## 2019-08-28 NOTE — CARE COORDINATION
I spoke to the patient and the son and the patient is adamantly refusing to go to [facility] she the plan is home under the care of her son and she was active with [facility] and will need resumption of [facility] orders, [facility] was notified. the patients 02 tank from home will need to be brought here to transport her home. The son was notified of this. I notified [facility] of this. Thanks [employer]

## 2019-08-28 NOTE — PROGRESS NOTES
Department of Internal Medicine  Nephrology Attending Progress Note    Events reviewed. SUBJECTIVE:  We are following Mrs. Heredia forAKI and hyperkalemia. Reports no new complaints.      Physical Exam:    VITALS:  BP (!) 151/70   Pulse 72   Temp 98.6 °F (37 °C)   Resp 16   Ht 5' 1\" (1.549 m)   Wt 158 lb 11.2 oz (72 kg)   SpO2 97%   BMI 29.99 kg/m²   24HR INTAKE/OUTPUT:      Intake/Output Summary (Last 24 hours) at 8/28/2019 1208  Last data filed at 8/28/2019 0509  Gross per 24 hour   Intake 360 ml   Output 1500 ml   Net -1140 ml     General: Alert, awake, oriented ×2, appears in mild respiratory distress, on O2 via nasal cannula at 4-5 L  Skin: no rash, turgor wnl  Heent:  eomi, mmm  Neck: no bruits or jvd noted  Cardiovascular:  S1, S2 without m/r/g  Respiratory: CTA B without w/r/r  Abdomen:  +bs, soft, nt, nd  Ext: No lower extremity edema  Psychiatric: mood and affect appropriate  Musculoskeletal:  Rom, muscular strength intact       DATA:    CBC:   Lab Results   Component Value Date    WBC 6.7 08/28/2019    RBC 2.93 08/28/2019    HGB 8.6 08/28/2019    HCT 27.7 08/28/2019    MCV 94.5 08/28/2019    MCH 29.4 08/28/2019    MCHC 31.0 08/28/2019    RDW 17.6 08/28/2019     08/28/2019    MPV 10.2 08/28/2019     CMP:    Lab Results   Component Value Date     08/28/2019    K 4.5 08/28/2019    K 6.0 08/25/2019     08/28/2019    CO2 28 08/28/2019    BUN 48 08/28/2019    CREATININE 1.8 08/28/2019    GFRAA 33 08/28/2019    LABGLOM 27 08/28/2019    GLUCOSE 109 08/28/2019    GLUCOSE 123 05/11/2012    PROT 6.2 08/22/2019    LABALBU 3.8 08/22/2019    LABALBU 4.3 05/11/2012    CALCIUM 9.0 08/28/2019    BILITOT 0.2 08/22/2019    ALKPHOS 62 08/22/2019    AST 19 08/22/2019    ALT 23 08/22/2019     Magnesium:    Lab Results   Component Value Date    MG 2.0 08/28/2019     Phosphorus:    Lab Results   Component Value Date    PHOS 2.8 08/28/2019     Pro- BNP: 18,564    Urine P/C: 0.4 gr/gr  Urine A/C: 183 mg/gr    Urine Na: 74  Urine K: 21.8  Urine CL: 51  Urine creat: 47      Radiology Review:      CXR 8/24/19   1. Stable, enlarged cardiomediastinal silhouette with underlying   central pulmonary vascular congestion and thoracic aortic vascular   calcifications. 2. Nonspecific bibasilar airspace disease, findings can be seen in   infiltrate/pneumonia and/or atelectasis. . Bilateral pleural effusions. CT Chest without contrast 8/16/19       1. New opacities present within right upper lobe may indicate new   atelectasis or pneumonia.       2. Areas of subsegmental atelectasis or scarring seen in the lung   bases.       3. Hyperinflation of both lungs might suggest chronic obstructive   pulmonary disease. Clinical correlation recommended.       4. Atherosclerotic disease associated with the coronary arteries.       5. Partial visualization of bowel containing ventral abdominal wall   hernia. Kidney US 7/15/18   COMPARISON: CT scan 7/9/2017       The right kidney measures 8 x 2.2 x 4.3 cm    The left kidney  measures 11.5 x 5.3 x 5.6 and   There is a hypoechoic nodule superior to the left kidney when compared   with the previous CT scan I suspect represents an accessory spleen   There is no hydronephrosis identified   There is no calculus identified   The bladder is unremarkable    Impression:  Right renal atrophy          MRA abdomen without contrast August 26, 2019   Findings:   5 sequences are submitted patient was premedicated. Patient was unable   to tolerate the scanner due to claustrophobia patient was unable to   tolerate IV contrast.       The study is technically suboptimal   The patient status post cholecystectomy   There is biliary dilatation within the limits of the study no   convincing obstruction or pancreatic mass is seen. There is a ventral hernia identified this appears to contain a portion   of colon. The origin of the right and left renal arteries cannot be evaluated.    There is severe

## 2019-08-28 NOTE — PROGRESS NOTES
Occupational Therapy  OT BEDSIDE TREATMENT NOTE      Date:2019  Patient Name: Trung Drew  MRN: [id number]  : 1945  Room: [address] [place], [place] [address]     Evaluating OT: Tiki Kendall OTR/L [id number]      AM-PAC Daily Activity Raw Score: 16  Recommended Adaptive Equipment: w/c, BSC     Diagnosis: Ambulatory Dysfunction    Surgery: none    Pertinent Medical History: COPD, CHF, DM, HTN, Arthritis     Precautions:  Falls, O2      Home Living: Pt lives with a friend  in a 1 story home  with 3 step(s) to enter and one  rail(s)  Bathroom setup: tub/shower   Equipment owned: ww,   Prior Level of Function: Moderate Assist  with ADLs , Moderate Assist  with IADLs; using ww for ambulation. Pt had recent Hospital admission and discharge. Pt reports only being home a few days and has not been able to complete any of her ADL's on her own. Pt also reports needing assist with all functional transfers   Driving: pt does not drive   Occupation: retired      Pain Level: none   Cognition: A&O: 4/4; Follows 1-2 step directions              Memory:  good               Sequencing:  good               Problem solving:  fair+               Judgement/safety:  fair+                 Functional Assessment:    Initial Eval Status  Date: 19 Treatment Status  Date: 19 Short Term Goals  Treatment frequency: PRN 1-3 x/week   Feeding Set up   mod I with increased time    To complete self feeding with increased time. Independent    Grooming Minimal Assist  Sup;    Pt able to comb hair and wash face while sitting up in the chair.     Supervision    UB Dressing Moderate Assist   Min A;    To micah/doff gown while sitting EOB with min verbal prompting to improve proper donning.    Supervision    LB Dressing Maximal Assist  Max A;    Assist with socks has CG to assist at home    Moderate Assist    Bathing NT         Toileting Maximal Assist   SBA with w/w- transfer  Cues for hand placement and walker safety  Mod A for hygiene while in standing. Minimal Assist    Bed Mobility  Supine to sit: Maximal Assist   Sit to supine: Maximal Assist   Supine to sit: SB Assist   Sit to supine: N/T  Supine to sit: Minimal Assist   Sit to supine: Minimal Assist    Functional Transfers Sit to stand: Maximal Assist   Stand to sit: Maximal Assist   Stand pivot: Maximal Assist  Sit to stand: SBA  Stand to sit: SBA  Stand pivot: SBA with use of w/w Improve sit to stand transfers to [address] stand pivot transfers to  Mod A    Functional Mobility NT  Min A with w/w short distance with SOB noted     Balance Sitting:     Static:  Min A     Dynamic:Mod A   Standing: Max A   Sitting:     Static:  SBA     Dynamic:Min A   Standing: SBA with w/w     Activity Tolerance Poor - pt complains of fatigue with light activity  Fair with OT 3L 95% with 93% after light activity Educate pt on energy conservation and pacing techniques to utilize during ADL's    Visual/  Perceptual Glasses: wears glasses all the time      Yes                  Hand dominance: right   UE ROM:        RUE:  WFL                  LUE:  WFL  Strength:        RUE: grossly 3+/5       LUE: grossly 3+/5   Strength: B WFL  Fine Motor Coordination:  WFL     Hearing: WFL  Sensation:  No c/o numbness or tingling  Tone:  WFL  Edema: none in BUE's                             Comments/Treatment: Upon arrival, patient in supine with HOB elevated. Pt was agreeable to OT treatment. Pt completed toileting simulation  and ADL tasks face and hair care seated due to decreased endurance. At end of session, patient assisted to bedside chair with call light and phone within reach, all lines and tubes intact. Pt would benefit from continued skilled OT to increase functional independence and quality of life. · Pt has made fair progress towards set goals. · Continue with current plan of care    Time in: 13:30  Time out:13:56  Total Tx Time: 26 mins    Minda Singh.  Tyrell 71, Travis 70

## 2019-08-28 NOTE — CARE COORDINATION
The patient is officially discharged and a [facility] will need started once the ot update is in. Ot has been called. I will follow The patient has been accepted to [facility] and is ready for discharge today.  Thanks [place]

## 2019-08-28 NOTE — FLOWSHEET NOTE
Inpatient Wound Care(Initial Consult)5203    Admit Date: 8/24/2019 12:42 PM    Reason for consult:  Bilateral knee,RLE    Significant history:  Admitted for fatigue    Arthritis      CAD (coronary artery disease)     [facility]estive [facility] failure) ([facility] [facility]     COPD (chronic obstructive pulmonary disease) ([facility] [facility]     Diabetes ([facility]     Diabetic ulcer of right calf associated with type 2 diabetes mellitus, with fat layer exposed ([facility] 3/6/2019   HTN (hypertension)     Hyperlipidemia     Osteomyelitis (HCC)     Postoperative anemia due to acute blood loss 5/1/2019   Pseudoaneurysm of left femoral artery ([facility] 5/1/2019   Thigh hematoma, left, initial encounter 5/1/2019   Thyroid disease      Findings:       08/28/19 1551   Wound 08/15/19 Knee Left;Medial   Date First Assessed/Time First Assessed: 08/15/19 1059   Present on Hospital Admission: Yes  Primary Wound Type: Pressure Injury  Location: Knee  Wound Location Orientation: Left;Medial   Wound Image    Wound Pressure Stage  2   Dressing/Treatment Xeroform;4x4;Other (comment)  (kerlix)   Wound Length (cm) 3.2 cm   Wound Width (cm) 4 cm   Wound Depth (cm) 0.1 cm   Wound Surface Area (cm^2) 12.8 cm^2   Change in Wound Size % (l*w) 5.88   Wound Volume (cm^3) 1.28 cm^3   Wound Assessment Red   Drainage Amount None   Betzaida-wound Assessment Intact   Red%Wound Bed 100   Wound 08/15/19 Knee Right;Medial   Date First Assessed/Time First Assessed: 08/15/19 1139   Present on Hospital Admission: Yes  Primary Wound Type: Pressure Injury  Location: Knee  Wound Location Orientation: Right;Medial   Wound Image    Wound Pressure Stage  3   Dressing/Treatment Xeroform;4x4  (kerlix)   Wound Length (cm) 3 cm   Wound Width (cm) 2.4 cm   Wound Depth (cm) 0.1 cm   Wound Surface Area (cm^2) 7.2 cm^2   Change in Wound Size % (l*w) 20   Wound Volume (cm^3) 0.72 cm^3   Wound Assessment Red;Yellow   Drainage Amount None   Betzaida-wound Assessment Intact   Red%Wound Bed 50   Yellow%Wound Bed 50   Wound 08/15/19 Leg Right; Lower;Medial   Date First Assessed/Time First Assessed: 08/15/19 1142   Present on Hospital Admission: Yes  Location: Leg  Wound Location Orientation: Right; Lower;Medial   Wound Image    Dressing/Treatment Xeroform;4x4;Other (comment)  (kerlix)   Wound Length (cm) 4 cm   Wound Width (cm) 3 cm   Wound Depth (cm) 0.1 cm   Wound Surface Area (cm^2) 12 cm^2   Change in Wound Size % (l*w) 16.67   Wound Volume (cm^3) 1.2 cm^3   Wound Healing % 17   Wound Assessment Red   Drainage Amount Scant   Drainage Description Serosanguinous   Betzaida-wound Assessment Intact   Red%Wound Bed 100   Wound 08/28/19 Pretibial Right   Date First Assessed/Time First Assessed: 08/28/19 1450   Location: Pretibial  Wound Location Orientation: Right   Dressing/Treatment Xeroform;4x4  (kerlix)   Wound Length (cm) 1 cm   Wound Width (cm) 4 cm   Wound Depth (cm) 0.1 cm   Wound Surface Area (cm^2) 4 cm^2   Wound Volume (cm^3) 0.4 cm^3   Wound Assessment Pink   Drainage Amount None   Betzaida-wound Assessment Intact   [place]%Wound Bed 100     **Informed Consent**    The patient has given verbal consent to have photos taken and inserted into their chart as part of their permanent medical record for purposes of documentation, treatment management and/or medical review. All Images taken on 8/28/19 of patient name: Donna Fermin were transmitted and stored on secured [employer] located within [facility] by a registered [employer] Application Device. Impression:    Right medial knee stage 3  Left medial knee stage 2    Plan:  Xeroform to wounds daily  Continue with follow up with wound center  Continue with preventive care.  Fredi Galloway 8/28/2019 4:02 PM

## 2019-08-28 NOTE — PROGRESS NOTES
Hospitalist Progress Note      Synopsis: Patient admitted on 8/24/2019   Due to ambulatory dysfunction shortly after discharge from hospitalization; MICHELLE; Hyperkalemia    Subjective    Seen and examined  Doing well  Wants to be able to go home after rehab. Stable overnight. No other overnight issues reported. Denies chest pain, shortness of breath, focal weakness    Exam:  BP (!) 140/68   Pulse 71   Temp 97 °F (36.1 °C) (Temporal)   Resp 20   Ht 5' 1\" (1.549 m)   Wt 158 lb 11.2 oz (72 kg)   SpO2 97%   BMI 29.99 kg/m²   General appearance: No apparent distress, appears stated age and cooperative. HEENT:  Conjunctivae/corneas clear. Neck: Supple. No jugular venous distention. Respiratory: Clear to auscultation bilaterally without rhonchi or rales  Cardiovascular: Regular rate rhythm without murmurs  Abdomen: Soft nontender  Musculoskeletal: No clubbing, cyanosis, no edema bilaterally. Brisk capillary refill.    Skin:  No rashes  on visible skin  Neurologic: awake, alert and following commands     Medications:  Reviewed    Infusion Medications    dextrose       Scheduled Medications    nadolol  20 mg Oral Daily    ipratropium-albuterol  1 ampule Inhalation Q4H WA    amiodarone  200 mg Oral Daily    apixaban  5 mg Oral Q12H    atorvastatin  80 mg Oral Nightly    calcium-vitamin D  1 tablet Oral BID    citalopram  20 mg Oral Nightly    clonazePAM  0.5 mg Oral BID    clopidogrel  75 mg Oral Daily    diltiazem  180 mg Oral Daily    fenofibrate  160 mg Oral Daily    hydrALAZINE  50 mg Oral 3 times per day    levothyroxine  137 mcg Oral Daily    pantoprazole  40 mg Oral Daily    polyethylene glycol  17 g Oral Daily    Roflumilast  500 mcg Oral Daily    linagliptin  5 mg Oral Daily    sodium chloride flush  10 mL Intravenous 2 times per day    insulin lispro  0-12 Units Subcutaneous TID WC    insulin lispro  0-6 Units Subcutaneous Nightly    mometasone-formoterol  2 puff Inhalation BID    tiotropium  18 mcg Inhalation Daily     PRN Meds: HYDROcodone-acetaminophen, sodium chloride flush, magnesium hydroxide, acetaminophen, glucose, dextrose, glucagon (rDNA), dextrose    I/O    Intake/Output Summary (Last 24 hours) at 8/28/2019 1014  Last data filed at 8/28/2019 0509  Gross per 24 hour   Intake 360 ml   Output 1500 ml   Net -1140 ml       Labs:   Recent Labs     08/28/19  0512   WBC 6.7   HGB 8.6*   HCT 27.7*   *       Recent Labs     08/26/19 2022 08/27/19  1029 08/28/19  0512    139 139   K 5.4* 4.7 4.5    99 101   CO2 31* 29 28   BUN 51* 48* 48*   CREATININE 2.1* 2.1* 1.8*   CALCIUM 9.2 9.3 9.0   PHOS  --   --  2.8       No results for input(s): PROT, ALB, ALKPHOS, ALT, AST, BILITOT, AMYLASE, LIPASE in the last 72 hours. No results for input(s): INR in the last 72 hours. No results for input(s): Shankar Beery in the last 72 hours. Chronic labs:  Lab Results   Component Value Date    CHOL 131 12/16/2018    TRIG 134 12/16/2018    HDL 33 12/16/2018    LDLCALC 71 12/16/2018    TSH 0.567 08/15/2019    INR 1.0 04/30/2019    LABA1C 4.7 06/14/2019       Radiology:    Most recent imaging studies reviewed today. ASSESSMENT:    Principal Problem:    Ambulatory dysfunction  Active Problems:    Acute kidney injury superimposed on chronic kidney disease (HCC)    Type 2 diabetes mellitus with renal complication (HCC)    HTN (hypertension)    HLD (hyperlipidemia)    COPD mixed type (HCC)    CAD (coronary artery disease)    Hypothyroidism    Tobacco dependence    Diabetic ulcer of right calf associated with type 2 diabetes mellitus, with fat layer exposed ([address]    PVD (peripheral vascular disease) ([facility]  Resolved Problems:    * No resolved hospital problems. *       PLAN:    NM renal scan/ MRA abdomen per nephro. Appreciate nephrology support. Work-up and management noted. Avoid scheduled narcotic use;  Lower dose of Norco as needed  Discussed with nephro  Ok for DC    Diet: Dietary Nutrition Supplements: Diabetic Oral Supplement  DIET CARB CONTROL; Low Potassium  Code Status: Full Code  Disposition: [place]     +++++++++++++++++++++++++++++++++++++++++++++++++  Erika Rachel MD   [address]  +++++++++++++++++++++++++++++++++++++++++++++++++  NOTE: This report was transcribed using voice recognition software. Every effort was made to ensure accuracy; however, inadvertent computerized transcription errors may be present.

## 2019-08-30 NOTE — PROGRESS NOTES
Marla from [facility] called and asked what Mitzi's last wound care orders were. I told her that we haven't seen her since 7/31/19. She has been in and out of the hospital.  I read her the order and told her that we didn't have home care on those orders. Marla also said that she has new wounds. Per Yannick Cuba, since we have not seen the new wounds, to just do a moist saline dressing until she can get an appointment. The patient will call next week to schedule with Dr Adina Carranza.

## 2019-09-16 PROBLEM — J96.01 ACUTE HYPOXEMIC RESPIRATORY FAILURE (HCC): Status: ACTIVE | Noted: 2019-01-01

## 2019-09-29 LAB
Lab: NORMAL
REPORT: NORMAL
THIS TEST SENT TO: NORMAL

## 2019-10-01 LAB
Lab: NORMAL
REPORT: NORMAL
THIS TEST SENT TO: NORMAL

## 2019-10-28 LAB
FUNGUS (MYCOLOGY) CULTURE: ABNORMAL
FUNGUS (MYCOLOGY) CULTURE: NORMAL
FUNGUS STAIN: ABNORMAL
FUNGUS STAIN: NORMAL
ORGANISM: ABNORMAL

## 2019-11-05 LAB
AFB CULTURE (MYCOBACTERIA): NORMAL
AFB SMEAR: NORMAL

## 2019-11-12 LAB
AFB CULTURE (MYCOBACTERIA): NORMAL
AFB CULTURE (MYCOBACTERIA): NORMAL
AFB SMEAR: NORMAL
AFB SMEAR: NORMAL

## (undated) DEVICE — SET EXTN IV L30IN TBNG DIA0.1IN PRIMING 4ML MACBOR FEM ADPT

## (undated) DEVICE — Device: Brand: MEDEX

## (undated) DEVICE — ADAPTER TBNG DIA15MM SWVL FBROPT BRONCHSCP TERM 2 AXIS PEEP

## (undated) DEVICE — DEFENDO AIR WATER SUCTION AND BIOPSY VALVE KIT FOR  OLYMPUS: Brand: DEFENDO AIR/WATER/SUCTION AND BIOPSY VALVE

## (undated) DEVICE — GAUZE,SPONGE,POST-OP,4X3,STRL,LF: Brand: MEDLINE

## (undated) DEVICE — SYRINGE MED 50ML LUERLOCK TIP

## (undated) DEVICE — CANNULA NSL ORAL AD FOR CAPNOFLEX CO2 O2 AIRLFE

## (undated) DEVICE — SURGICAL PROCEDURE PACK BRONCH

## (undated) DEVICE — SOLUTION IV IRRIG 500ML 0.9% SODIUM CHL 2F7123

## (undated) DEVICE — BLOCK BITE 60FR RUBBER ADLT DENTAL